# Patient Record
Sex: FEMALE | Race: WHITE | NOT HISPANIC OR LATINO
[De-identification: names, ages, dates, MRNs, and addresses within clinical notes are randomized per-mention and may not be internally consistent; named-entity substitution may affect disease eponyms.]

---

## 2017-02-02 ENCOUNTER — MESSAGE (OUTPATIENT)
Age: 72
End: 2017-02-02

## 2017-02-14 ENCOUNTER — APPOINTMENT (OUTPATIENT)
Dept: NEUROLOGY | Facility: CLINIC | Age: 72
End: 2017-02-14

## 2017-02-14 VITALS
HEART RATE: 93 BPM | SYSTOLIC BLOOD PRESSURE: 141 MMHG | BODY MASS INDEX: 36.8 KG/M2 | HEIGHT: 62 IN | WEIGHT: 200 LBS | DIASTOLIC BLOOD PRESSURE: 81 MMHG

## 2017-03-10 ENCOUNTER — APPOINTMENT (OUTPATIENT)
Dept: NEUROLOGY | Facility: CLINIC | Age: 72
End: 2017-03-10

## 2017-03-20 ENCOUNTER — OTHER (OUTPATIENT)
Age: 72
End: 2017-03-20

## 2017-03-21 ENCOUNTER — OTHER (OUTPATIENT)
Age: 72
End: 2017-03-21

## 2017-04-14 ENCOUNTER — APPOINTMENT (OUTPATIENT)
Dept: NEUROLOGY | Facility: CLINIC | Age: 72
End: 2017-04-14

## 2017-04-14 VITALS
WEIGHT: 175 LBS | HEIGHT: 62 IN | SYSTOLIC BLOOD PRESSURE: 154 MMHG | BODY MASS INDEX: 32.2 KG/M2 | DIASTOLIC BLOOD PRESSURE: 91 MMHG | HEART RATE: 77 BPM

## 2017-07-14 ENCOUNTER — APPOINTMENT (OUTPATIENT)
Dept: NEUROLOGY | Facility: CLINIC | Age: 72
End: 2017-07-14

## 2017-07-14 VITALS — HEART RATE: 72 BPM | DIASTOLIC BLOOD PRESSURE: 91 MMHG | SYSTOLIC BLOOD PRESSURE: 178 MMHG

## 2017-07-18 ENCOUNTER — MEDICATION RENEWAL (OUTPATIENT)
Age: 72
End: 2017-07-18

## 2017-09-05 ENCOUNTER — RX RENEWAL (OUTPATIENT)
Age: 72
End: 2017-09-05

## 2017-09-18 ENCOUNTER — EMERGENCY (EMERGENCY)
Facility: HOSPITAL | Age: 72
LOS: 1 days | Discharge: HOME | End: 2017-09-18
Attending: EMERGENCY MEDICINE | Admitting: EMERGENCY MEDICINE
Payer: MEDICARE

## 2017-09-18 VITALS
WEIGHT: 179.9 LBS | HEART RATE: 78 BPM | RESPIRATION RATE: 16 BRPM | TEMPERATURE: 98 F | OXYGEN SATURATION: 97 % | SYSTOLIC BLOOD PRESSURE: 155 MMHG | DIASTOLIC BLOOD PRESSURE: 96 MMHG

## 2017-09-18 VITALS — RESPIRATION RATE: 17 BRPM | OXYGEN SATURATION: 99 % | TEMPERATURE: 98 F

## 2017-09-18 DIAGNOSIS — G20 PARKINSON'S DISEASE: ICD-10-CM
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
## 2017-09-18 DIAGNOSIS — Z98.49 CATARACT EXTRACTION STATUS, UNSPECIFIED EYE: ICD-10-CM

## 2017-09-18 DIAGNOSIS — F41.9 ANXIETY DISORDER, UNSPECIFIED: ICD-10-CM

## 2017-09-18 LAB
ALBUMIN SERPL ELPH-MCNC: 3.6 G/DL — SIGNIFICANT CHANGE UP (ref 3.3–5)
ALP SERPL-CCNC: 77 U/L — SIGNIFICANT CHANGE UP (ref 40–120)
ALT FLD-CCNC: 16 U/L — SIGNIFICANT CHANGE UP (ref 12–78)
AMYLASE P1 CFR SERPL: 57 U/L — SIGNIFICANT CHANGE UP (ref 25–115)
ANION GAP SERPL CALC-SCNC: 5 MMOL/L — SIGNIFICANT CHANGE UP (ref 5–17)
APPEARANCE UR: CLEAR — SIGNIFICANT CHANGE UP
AST SERPL-CCNC: 11 U/L — LOW (ref 15–37)
BASOPHILS # BLD AUTO: 0.1 K/UL — SIGNIFICANT CHANGE UP (ref 0–0.2)
BASOPHILS NFR BLD AUTO: 0.8 % — SIGNIFICANT CHANGE UP (ref 0–2)
BILIRUB SERPL-MCNC: 0.4 MG/DL — SIGNIFICANT CHANGE UP (ref 0.2–1.2)
BILIRUB UR-MCNC: NEGATIVE — SIGNIFICANT CHANGE UP
BUN SERPL-MCNC: 19 MG/DL — SIGNIFICANT CHANGE UP (ref 7–23)
CALCIUM SERPL-MCNC: 8.5 MG/DL — SIGNIFICANT CHANGE UP (ref 8.5–10.1)
CHLORIDE SERPL-SCNC: 105 MMOL/L — SIGNIFICANT CHANGE UP (ref 96–108)
CK MB BLD-MCNC: 2.1 % — SIGNIFICANT CHANGE UP (ref 0–3.5)
CK MB CFR SERPL CALC: 0.9 NG/ML — SIGNIFICANT CHANGE UP (ref 0–3.6)
CK SERPL-CCNC: 42 U/L — SIGNIFICANT CHANGE UP (ref 26–192)
CO2 SERPL-SCNC: 30 MMOL/L — SIGNIFICANT CHANGE UP (ref 22–31)
COLOR SPEC: YELLOW — SIGNIFICANT CHANGE UP
CREAT SERPL-MCNC: 0.83 MG/DL — SIGNIFICANT CHANGE UP (ref 0.5–1.3)
DIFF PNL FLD: ABNORMAL
EOSINOPHIL # BLD AUTO: 0.1 K/UL — SIGNIFICANT CHANGE UP (ref 0–0.5)
EOSINOPHIL NFR BLD AUTO: 0.6 % — SIGNIFICANT CHANGE UP (ref 0–6)
GLUCOSE SERPL-MCNC: 93 MG/DL — SIGNIFICANT CHANGE UP (ref 70–99)
GLUCOSE UR QL: NEGATIVE — SIGNIFICANT CHANGE UP
HCT VFR BLD CALC: 44.5 % — SIGNIFICANT CHANGE UP (ref 34.5–45)
HGB BLD-MCNC: 14.4 G/DL — SIGNIFICANT CHANGE UP (ref 11.5–15.5)
KETONES UR-MCNC: NEGATIVE — SIGNIFICANT CHANGE UP
LEUKOCYTE ESTERASE UR-ACNC: NEGATIVE — SIGNIFICANT CHANGE UP
LIDOCAIN IGE QN: 215 U/L — SIGNIFICANT CHANGE UP (ref 73–393)
LYMPHOCYTES # BLD AUTO: 1.1 K/UL — SIGNIFICANT CHANGE UP (ref 1–3.3)
LYMPHOCYTES # BLD AUTO: 10.4 % — LOW (ref 13–44)
MCHC RBC-ENTMCNC: 29.4 PG — SIGNIFICANT CHANGE UP (ref 27–34)
MCHC RBC-ENTMCNC: 32.4 GM/DL — SIGNIFICANT CHANGE UP (ref 32–36)
MCV RBC AUTO: 90.9 FL — SIGNIFICANT CHANGE UP (ref 80–100)
MONOCYTES # BLD AUTO: 0.8 K/UL — SIGNIFICANT CHANGE UP (ref 0–0.9)
MONOCYTES NFR BLD AUTO: 7.5 % — SIGNIFICANT CHANGE UP (ref 1–9)
NEUTROPHILS # BLD AUTO: 8.3 K/UL — HIGH (ref 1.8–7.4)
NEUTROPHILS NFR BLD AUTO: 80.7 % — HIGH (ref 43–77)
NITRITE UR-MCNC: NEGATIVE — SIGNIFICANT CHANGE UP
NT-PROBNP SERPL-SCNC: 212 PG/ML — HIGH (ref 0–125)
PH UR: 5 — SIGNIFICANT CHANGE UP (ref 5–8)
PLATELET # BLD AUTO: 231 K/UL — SIGNIFICANT CHANGE UP (ref 150–400)
POTASSIUM SERPL-MCNC: 4.9 MMOL/L — SIGNIFICANT CHANGE UP (ref 3.5–5.3)
POTASSIUM SERPL-SCNC: 4.9 MMOL/L — SIGNIFICANT CHANGE UP (ref 3.5–5.3)
PROT SERPL-MCNC: 7.2 G/DL — SIGNIFICANT CHANGE UP (ref 6–8.3)
PROT UR-MCNC: NEGATIVE — SIGNIFICANT CHANGE UP
RBC # BLD: 4.9 M/UL — SIGNIFICANT CHANGE UP (ref 3.8–5.2)
RBC # FLD: 12.6 % — SIGNIFICANT CHANGE UP (ref 10.3–14.5)
SODIUM SERPL-SCNC: 140 MMOL/L — SIGNIFICANT CHANGE UP (ref 135–145)
SP GR SPEC: 1.01 — SIGNIFICANT CHANGE UP (ref 1.01–1.02)
TROPONIN I SERPL-MCNC: <.015 NG/ML — SIGNIFICANT CHANGE UP (ref 0.01–0.04)
UROBILINOGEN FLD QL: NEGATIVE — SIGNIFICANT CHANGE UP
WBC # BLD: 10.3 K/UL — SIGNIFICANT CHANGE UP (ref 3.8–10.5)
WBC # FLD AUTO: 10.3 K/UL — SIGNIFICANT CHANGE UP (ref 3.8–10.5)

## 2017-09-18 PROCEDURE — 81001 URINALYSIS AUTO W/SCOPE: CPT

## 2017-09-18 PROCEDURE — 70450 CT HEAD/BRAIN W/O DYE: CPT

## 2017-09-18 PROCEDURE — 71045 X-RAY EXAM CHEST 1 VIEW: CPT

## 2017-09-18 PROCEDURE — 85027 COMPLETE CBC AUTOMATED: CPT

## 2017-09-18 PROCEDURE — 72125 CT NECK SPINE W/O DYE: CPT | Mod: 26

## 2017-09-18 PROCEDURE — 82550 ASSAY OF CK (CPK): CPT

## 2017-09-18 PROCEDURE — 96360 HYDRATION IV INFUSION INIT: CPT

## 2017-09-18 PROCEDURE — 72125 CT NECK SPINE W/O DYE: CPT

## 2017-09-18 PROCEDURE — 82553 CREATINE MB FRACTION: CPT

## 2017-09-18 PROCEDURE — 99284 EMERGENCY DEPT VISIT MOD MDM: CPT | Mod: 25

## 2017-09-18 PROCEDURE — 36415 COLL VENOUS BLD VENIPUNCTURE: CPT

## 2017-09-18 PROCEDURE — 93005 ELECTROCARDIOGRAM TRACING: CPT

## 2017-09-18 PROCEDURE — 87086 URINE CULTURE/COLONY COUNT: CPT

## 2017-09-18 PROCEDURE — 96361 HYDRATE IV INFUSION ADD-ON: CPT

## 2017-09-18 PROCEDURE — 82150 ASSAY OF AMYLASE: CPT

## 2017-09-18 PROCEDURE — 71010: CPT | Mod: 26

## 2017-09-18 PROCEDURE — 99285 EMERGENCY DEPT VISIT HI MDM: CPT

## 2017-09-18 PROCEDURE — 83690 ASSAY OF LIPASE: CPT

## 2017-09-18 PROCEDURE — 84484 ASSAY OF TROPONIN QUANT: CPT

## 2017-09-18 PROCEDURE — 70450 CT HEAD/BRAIN W/O DYE: CPT | Mod: 26

## 2017-09-18 PROCEDURE — 83880 ASSAY OF NATRIURETIC PEPTIDE: CPT

## 2017-09-18 PROCEDURE — 80053 COMPREHEN METABOLIC PANEL: CPT

## 2017-09-18 RX ORDER — SODIUM CHLORIDE 9 MG/ML
1000 INJECTION INTRAMUSCULAR; INTRAVENOUS; SUBCUTANEOUS ONCE
Qty: 0 | Refills: 0 | Status: COMPLETED | OUTPATIENT
Start: 2017-09-18 | End: 2017-09-18

## 2017-09-18 RX ORDER — SODIUM CHLORIDE 9 MG/ML
3 INJECTION INTRAMUSCULAR; INTRAVENOUS; SUBCUTANEOUS ONCE
Qty: 0 | Refills: 0 | Status: COMPLETED | OUTPATIENT
Start: 2017-09-18 | End: 2017-09-18

## 2017-09-18 RX ADMIN — SODIUM CHLORIDE 3 MILLILITER(S): 9 INJECTION INTRAMUSCULAR; INTRAVENOUS; SUBCUTANEOUS at 14:11

## 2017-09-18 RX ADMIN — SODIUM CHLORIDE 1000 MILLILITER(S): 9 INJECTION INTRAMUSCULAR; INTRAVENOUS; SUBCUTANEOUS at 15:02

## 2017-09-18 RX ADMIN — SODIUM CHLORIDE 1000 MILLILITER(S): 9 INJECTION INTRAMUSCULAR; INTRAVENOUS; SUBCUTANEOUS at 16:30

## 2017-09-18 NOTE — ED PROVIDER NOTE - PLAN OF CARE
Return to the ED for any new or worsening symptoms  Take your medication as prescribed  Follow up with your PMD in 2-3 days for a recheck  Increase your water intake   Advance activity as tolerated

## 2017-09-18 NOTE — ED PROVIDER NOTE - OBJECTIVE STATEMENT
Pt is a 73 yo female who presents to the ED with a cc of near syncope.  PMHx of anxiety and Parkinson's disease.  Pt reports that she was coming out of the grocery store and walking to her car pushing the shopping cart.  She has felt lightheaded in the store but had not thought much of it.  While she was walking to her car symptoms worsened.  She reports that she was almost to her vehicle when her symptoms worsened, she lost her balance, and fell to the ground striking the back of her head.  Denies LOC.  Pt is not on blood thinners.   Reports associated nausea and pain at site of impact.  Denies fever, chills, V/D/C, CP, SOB, abd pain, ext numbness or weakness.  Pt reports that she had little to eat or drink and that she frequently becomes dehydrated.   Pt has no known cardiac disease.

## 2017-09-18 NOTE — ED PROVIDER NOTE - NEUROLOGICAL, MLM
Alert and oriented, no focal deficits, no motor or sensory deficits. Resting tremor noted related to pt Parkinson's disease

## 2017-09-18 NOTE — ED PROVIDER NOTE - CHPI ED SYMPTOMS NEG
no cough/no shortness of breath/no fever/no vomiting/no diaphoresis/no chills/no chest pain/no back pain

## 2017-09-18 NOTE — ED PROVIDER NOTE - PSH
Detached Retina, Left  and macular hole 5/2011  Macular Hole  2010  Retinal Tear  2003  S/P Cataract Surgery  2011

## 2017-09-18 NOTE — ED PROVIDER NOTE - CARE PLAN
Principal Discharge DX:	Lightheadedness  Instructions for follow-up, activity and diet:	Return to the ED for any new or worsening symptoms  Take your medication as prescribed  Follow up with your PMD in 2-3 days for a recheck  Increase your water intake   Advance activity as tolerated  Secondary Diagnosis:	Anxiety  Secondary Diagnosis:	Parkinson disease

## 2017-09-18 NOTE — CONSULT NOTE ADULT - SUBJECTIVE AND OBJECTIVE BOX
CHIEF COMPLAINT: Patient is a 72y old  Female who presents with a chief complaint of syncope    HPI:  72 F w hx of Parkinson, anxiety who presents with near syncope. She was walking outside in a parking lot and felt very lightheaded and nauseous Then she fell and hit the back of her head. Denies any LOC. Denies any chest pain, dyspnea, PND, orthopnea,  syncope, lower extremity edema, stroke like symptoms. SHe does not stay well hydrated and has overall poor PO intake.     EKG: SR, ?LVH PVCs    REVIEW OF SYSTEMS:   All other review of systems are negative unless indicated above    PAST MEDICAL & SURGICAL HISTORY:  Parkinson disease  Anxiety  Detached Retina, Left: and macular hole 5/2011  S/P Cataract Surgery: 2011  Macular Hole: 2010  Retinal Tear: 2003      SOCIAL HISTORY:  No tobacco, ethanol, or drug abuse.    FAMILY HISTORY:    No family history of acute MI or sudden cardiac death.    Meds: Sinemet, xanax, wellbutrin, zoloft.       Allergies    No Known Allergies    Intolerances            VITAL SIGNS:   Vital Signs Last 24 Hrs  T(C): 36.6 (18 Sep 2017 12:27), Max: 36.6 (18 Sep 2017 12:27)  T(F): 97.9 (18 Sep 2017 12:27), Max: 97.9 (18 Sep 2017 12:27)  HR: 78 (18 Sep 2017 12:27) (78 - 78)  BP: 155/96 (18 Sep 2017 12:27) (155/96 - 155/96)  BP(mean): --  RR: 16 (18 Sep 2017 12:27) (16 - 16)  SpO2: 97% (18 Sep 2017 12:27) (97% - 97%)    I&O's Summary      On Exam:     Constitutional: NAD, awake and alert, well-developed  HEENT: Dry Mucous Membranes, Anicteric  Pulmonary: Non-labored, breath sounds are clear bilaterally, No wheezing, rales or rhonchi  Cardiovascular: Regular, S1 and S2, No murmurs, rubs, gallops or clicks  Gastrointestinal: Bowel Sounds present, soft, nontender.   Lymph: No peripheral edema. No lymphadenopathy.  Skin: No visible rashes or ulcers.  Psych:  Mood & affect appropriate    LABS: All Labs Reviewed:                        14.4   10.3  )-----------( 231      ( 18 Sep 2017 13:53 )             44.5     18 Sep 2017 15:13    140    |  105    |  19     ----------------------------<  93     4.9     |  30     |  0.83     Ca    8.5        18 Sep 2017 15:13    TPro  7.2    /  Alb  3.6    /  TBili  0.4    /  DBili  x      /  AST  11     /  ALT  16     /  AlkPhos  77     18 Sep 2017 15:13      CARDIAC MARKERS ( 18 Sep 2017 15:13 )  <.015 ng/mL / x     / 42 U/L / x     / 0.9 ng/mL      Blood Culture:   09-18 @ 15:13  Pro Bnp 212        RADIOLOGY: CHIEF COMPLAINT: Patient is a 72y old  Female who presents with a chief complaint of syncope    HPI:  72 F w hx of Parkinson, anxiety who presents with near syncope. She was walking outside in a parking lot and felt very lightheaded and nauseous Then she fell and hit the back of her head. Denies any LOC. Denies any chest pain, dyspnea, PND, orthopnea,  syncope, lower extremity edema, stroke like symptoms. SHe does not stay well hydrated and has overall poor PO intake.     EKG: SR, ?LVH PVCs    REVIEW OF SYSTEMS:   All other review of systems are negative unless indicated above    PAST MEDICAL & SURGICAL HISTORY:  Parkinson disease  Anxiety  Detached Retina, Left: and macular hole 5/2011  S/P Cataract Surgery: 2011  Macular Hole: 2010  Retinal Tear: 2003      SOCIAL HISTORY:  No tobacco, ethanol, or drug abuse.    FAMILY HISTORY:    No family history of acute MI or sudden cardiac death.    Meds: Sinemet, xanax, wellbutrin, zoloft.       Allergies    No Known Allergies    Intolerances            VITAL SIGNS:   Vital Signs Last 24 Hrs  T(C): 36.6 (18 Sep 2017 12:27), Max: 36.6 (18 Sep 2017 12:27)  T(F): 97.9 (18 Sep 2017 12:27), Max: 97.9 (18 Sep 2017 12:27)  HR: 78 (18 Sep 2017 12:27) (78 - 78)  BP: 155/96 (18 Sep 2017 12:27) (155/96 - 155/96)  BP(mean): --  RR: 16 (18 Sep 2017 12:27) (16 - 16)  SpO2: 97% (18 Sep 2017 12:27) (97% - 97%)    I&O's Summary      On Exam:     Constitutional: NAD, awake and alert, well-developed  HEENT: Dry Mucous Membranes, Anicteric  Pulmonary: Non-labored, breath sounds are clear bilaterally, No wheezing, rales or rhonchi  Cardiovascular: Regular, S1 and S2, No murmurs, rubs, gallops or clicks  Gastrointestinal: Bowel Sounds present, soft, nontender.   Lymph: No peripheral edema. No lymphadenopathy.  Skin: No visible rashes or ulcers.  Psych:  Mood & affect appropriate    LABS: All Labs Reviewed:                        14.4   10.3  )-----------( 231      ( 18 Sep 2017 13:53 )             44.5     18 Sep 2017 15:13    140    |  105    |  19     ----------------------------<  93     4.9     |  30     |  0.83     Ca    8.5        18 Sep 2017 15:13    TPro  7.2    /  Alb  3.6    /  TBili  0.4    /  DBili  x      /  AST  11     /  ALT  16     /  AlkPhos  77     18 Sep 2017 15:13      CARDIAC MARKERS ( 18 Sep 2017 15:13 )  <.015 ng/mL / x     / 42 U/L / x     / 0.9 ng/mL      Blood Culture:   09-18 @ 15:13  Pro Bnp 212        RADIOLOGY:       < from: Xray Chest 1 View AP- PORTABLE-Urgent (09.18.17 @ 14:00) >    EXAM:  PORTABLE CHEST URGENT                            PROCEDURE DATE:  09/18/2017          INTERPRETATION:  History: Cough and shortness of breath    Portable AP radiograph of the chest is performed. Comparison is made to   2016.    The cardiomediastinal silhouette is normal. the trachea is midline. There   is no focal infiltrate or pleural effusion. The osseous structures are   intact.    Impression: No active disease. No change.                CATHERINE MICHAEL   This document has been electronically signed. Sep 18 2017  2:43PM                < end of copied text >    < from: CT Head No Cont (09.18.17 @ 14:39) >    EXAM:  CT BRAIN                            PROCEDURE DATE:  09/18/2017          INTERPRETATION:  History: Fall, hit head    Noncontrast head CT.   Moderate cerebral parenchymal volume loss. Normal brain density for age.   No bleed extra-axial collection infarct or mass effect. Clear sinuses. No   displaced fracture. Left posterior parietal scalp hematoma.    Impression: No intracranial bleed or displaced fracture.                  KAROLINA CASAREZ M.D., ATTENDING RADIOLOGIST  This document has been electronically signed. Sep 18 2017  2:43PM                < end of copied text >

## 2017-09-18 NOTE — CONSULT NOTE ADULT - ASSESSMENT
72 F w near syncope  - likely from volume depletion. + orthostatics.   - IVF.   - No signs of significant ischemia or volume overload.   - EKG without ischemic changes. Isolated PVCs. Monitor and replete electrolytes. Keep K>4.0 and Mg>2.0.   - Outpt echo  Patient agrees with the plan and will contact our office to arrange followup.

## 2017-09-18 NOTE — ED PROVIDER NOTE - MEDICAL DECISION MAKING DETAILS
cbc, cmp, amylase, lipase, cardiac enzymes, BNP, EKG, chest x-ray, CT head, cervical spine, chest x-ray, UA, urine culture, IVF, orthostatic vitals, cardiology consult

## 2017-09-18 NOTE — ED PROVIDER NOTE - PROGRESS NOTE DETAILS
Pt initially orthostatic on presentation resolved with IVF hydration. Seen by cardiology symptoms likely related to dehydration cleared for discharge home with outpatient follow up.  Results of labs and images reviewed, all questions answered, copy provided.

## 2017-09-18 NOTE — ED ADULT NURSE NOTE - OBJECTIVE STATEMENT
Pt. received alert and oriented x4 with chief complaint of "legs giving out and falling in the supermarket." Pt. states she hit the back of her head but denies LOC.

## 2017-09-19 LAB
CULTURE RESULTS: SIGNIFICANT CHANGE UP
SPECIMEN SOURCE: SIGNIFICANT CHANGE UP

## 2017-09-23 ENCOUNTER — TRANSCRIPTION ENCOUNTER (OUTPATIENT)
Age: 72
End: 2017-09-23

## 2017-09-27 ENCOUNTER — TRANSCRIPTION ENCOUNTER (OUTPATIENT)
Age: 72
End: 2017-09-27

## 2017-10-02 ENCOUNTER — RX RENEWAL (OUTPATIENT)
Age: 72
End: 2017-10-02

## 2017-11-06 ENCOUNTER — RX RENEWAL (OUTPATIENT)
Age: 72
End: 2017-11-06

## 2017-11-21 ENCOUNTER — APPOINTMENT (OUTPATIENT)
Dept: NEUROLOGY | Facility: CLINIC | Age: 72
End: 2017-11-21
Payer: MEDICARE

## 2017-11-21 VITALS
WEIGHT: 190 LBS | BODY MASS INDEX: 34.96 KG/M2 | SYSTOLIC BLOOD PRESSURE: 150 MMHG | HEART RATE: 82 BPM | DIASTOLIC BLOOD PRESSURE: 83 MMHG | HEIGHT: 62 IN

## 2017-11-21 PROCEDURE — 99214 OFFICE O/P EST MOD 30 MIN: CPT

## 2017-12-28 ENCOUNTER — RX RENEWAL (OUTPATIENT)
Age: 72
End: 2017-12-28

## 2018-01-26 ENCOUNTER — MEDICATION RENEWAL (OUTPATIENT)
Age: 73
End: 2018-01-26

## 2018-03-02 ENCOUNTER — APPOINTMENT (OUTPATIENT)
Dept: NEUROLOGY | Facility: CLINIC | Age: 73
End: 2018-03-02
Payer: MEDICARE

## 2018-03-02 PROCEDURE — 99214 OFFICE O/P EST MOD 30 MIN: CPT

## 2018-03-19 ENCOUNTER — MEDICATION RENEWAL (OUTPATIENT)
Age: 73
End: 2018-03-19

## 2018-03-20 ENCOUNTER — MEDICATION RENEWAL (OUTPATIENT)
Age: 73
End: 2018-03-20

## 2018-03-28 ENCOUNTER — APPOINTMENT (OUTPATIENT)
Dept: NEUROLOGY | Facility: CLINIC | Age: 73
End: 2018-03-28
Payer: MEDICARE

## 2018-03-28 VITALS
HEART RATE: 84 BPM | WEIGHT: 190 LBS | DIASTOLIC BLOOD PRESSURE: 77 MMHG | BODY MASS INDEX: 34.75 KG/M2 | SYSTOLIC BLOOD PRESSURE: 135 MMHG

## 2018-03-28 PROCEDURE — 99214 OFFICE O/P EST MOD 30 MIN: CPT

## 2018-04-11 ENCOUNTER — RX RENEWAL (OUTPATIENT)
Age: 73
End: 2018-04-11

## 2018-04-11 ENCOUNTER — CLINICAL ADVICE (OUTPATIENT)
Age: 73
End: 2018-04-11

## 2018-05-24 ENCOUNTER — MEDICATION RENEWAL (OUTPATIENT)
Age: 73
End: 2018-05-24

## 2018-06-28 ENCOUNTER — OTHER (OUTPATIENT)
Age: 73
End: 2018-06-28

## 2018-07-30 ENCOUNTER — EMERGENCY (EMERGENCY)
Facility: HOSPITAL | Age: 73
LOS: 1 days | Discharge: HOME | End: 2018-07-30
Attending: EMERGENCY MEDICINE
Payer: MEDICARE

## 2018-07-30 VITALS
OXYGEN SATURATION: 96 % | TEMPERATURE: 98 F | WEIGHT: 179.9 LBS | HEIGHT: 62 IN | RESPIRATION RATE: 16 BRPM | SYSTOLIC BLOOD PRESSURE: 150 MMHG | HEART RATE: 81 BPM | DIASTOLIC BLOOD PRESSURE: 89 MMHG

## 2018-07-30 VITALS
RESPIRATION RATE: 17 BRPM | TEMPERATURE: 98 F | OXYGEN SATURATION: 100 % | DIASTOLIC BLOOD PRESSURE: 74 MMHG | HEART RATE: 80 BPM | SYSTOLIC BLOOD PRESSURE: 147 MMHG

## 2018-07-30 PROBLEM — G20 PARKINSON'S DISEASE: Chronic | Status: ACTIVE | Noted: 2017-09-18

## 2018-07-30 PROCEDURE — 90471 IMMUNIZATION ADMIN: CPT

## 2018-07-30 PROCEDURE — 99284 EMERGENCY DEPT VISIT MOD MDM: CPT

## 2018-07-30 PROCEDURE — 70450 CT HEAD/BRAIN W/O DYE: CPT

## 2018-07-30 PROCEDURE — 70450 CT HEAD/BRAIN W/O DYE: CPT | Mod: 26

## 2018-07-30 PROCEDURE — 90715 TDAP VACCINE 7 YRS/> IM: CPT

## 2018-07-30 PROCEDURE — 99284 EMERGENCY DEPT VISIT MOD MDM: CPT | Mod: 25

## 2018-07-30 RX ORDER — TETANUS TOXOID, REDUCED DIPHTHERIA TOXOID AND ACELLULAR PERTUSSIS VACCINE, ADSORBED 5; 2.5; 8; 8; 2.5 [IU]/.5ML; [IU]/.5ML; UG/.5ML; UG/.5ML; UG/.5ML
0.5 SUSPENSION INTRAMUSCULAR ONCE
Qty: 0 | Refills: 0 | Status: COMPLETED | OUTPATIENT
Start: 2018-07-30 | End: 2018-07-30

## 2018-07-30 RX ADMIN — TETANUS TOXOID, REDUCED DIPHTHERIA TOXOID AND ACELLULAR PERTUSSIS VACCINE, ADSORBED 0.5 MILLILITER(S): 5; 2.5; 8; 8; 2.5 SUSPENSION INTRAMUSCULAR at 14:29

## 2018-07-30 NOTE — ED ADULT NURSE NOTE - OBJECTIVE STATEMENT
Present to ER with c/o of hematoma on right forehead s/p fall. Pt staets she fell on a carpeted floor. Pt has history of parkinsons. Denies any LOC or pain. Pt was instructed by PMD to come and get checked.

## 2018-07-30 NOTE — ED PROVIDER NOTE - PROGRESS NOTE DETAILS
Ct results discussed with patient. no acute fx or ICH. no sinus tenderness. do not suspect acute sinusitis. head injury instructions discussed and return precautions explained

## 2018-07-30 NOTE — ED PROVIDER NOTE - NEUROLOGICAL, MLM
Alert and oriented. symmetric eyebrow raise and smile. elevates tongue and shoulders without difficulty. sensation to face equal bilaterally. normal finger to nose. good  strength bilaterally

## 2018-07-30 NOTE — ED ADULT TRIAGE NOTE - CHIEF COMPLAINT QUOTE
"I have parkinsons & I fell" hematoma right davidson   denies LOC  denies any pain was instructed to come & "get checked out by PMD"

## 2018-07-30 NOTE — ED PROVIDER NOTE - OBJECTIVE STATEMENT
presents for a fall and head injury. has parkinson's and unsteady on feet. was walking without her walker, tripped, and hit her head on the hardwood floor. no LOC. does not take any blood thinners. denies any confusion or memory loss. acting appropriate per . no neck or back pain. small abrasion to right elbow but no elbow pain. unsure of tetanus status. developed a hematoma to right forehead so was seen by PCP who sent her to ED for further evaluation due to hematoma. reports mild dizziness and nausea, but states this is chronic and no change after fall. no other complaints  PCP Camille

## 2018-07-30 NOTE — ED PROVIDER NOTE - EYES, MLM
Clear bilaterally, pupils equal, round and reactive to light. EOMI. no orbital wall tenderness or crepitus

## 2018-07-30 NOTE — ED PROVIDER NOTE - MEDICAL DECISION MAKING DETAILS
mechanical fall today at 1030. hematoma to right side of forehead. will CT. does not take any blood thinners. no neck or back pain. no ext tenderness. do not suspect ext fx. will update tetanus

## 2018-07-30 NOTE — ED PROVIDER NOTE - CHPI ED SYMPTOMS NEG
no confusion/no loss of consciousness/no blurred vision/no vomiting/no weakness/no change in level of consciousness

## 2018-07-30 NOTE — ED PROVIDER NOTE - ENMT, MLM
Airway patent, Mouth with normal mucosa. Throat has no vesicles, no oropharyngeal exudates and uvula is midline.  no castillo sign or raccoon eyes. no hemotympanum. small hematoma to right side of forehead

## 2018-07-30 NOTE — ED PROVIDER NOTE - NEURO NEGATIVE STATEMENT, MLM
no loss of consciousness, no gait abnormality, no headache, no sensory deficits, and no weakness. mild chronic dizziness, no change in dizziness after fall

## 2018-07-30 NOTE — ED PROVIDER NOTE - MUSCULOSKELETAL, MLM
no cervical/thoracic/lumbar vertebral tenderness or step off deformities. no ext tenderness. full ROM including pronation and supination of elbow

## 2018-07-30 NOTE — ED PROVIDER NOTE - ATTENDING CONTRIBUTION TO CARE
pt referred by pmd for head injury and abrasions s/p fall this am. pt reports has parkinsons, falls frequently. pt declining pain meds. no loc, n/v, weakness, numbness, cp, sob, abd pain, arm or leg pain, neck or back pain.  wd, wn female, nad, right forehead hematoma, airway intact, mmm, perrl, s1s2 rrr, lung cta, abd soft, nt, ext nt, full rom, spine nt, cn2-12 intact, no motor or sensory deficit. abrasion right elbow

## 2018-08-08 ENCOUNTER — APPOINTMENT (OUTPATIENT)
Dept: NEUROLOGY | Facility: CLINIC | Age: 73
End: 2018-08-08
Payer: MEDICARE

## 2018-08-08 VITALS
BODY MASS INDEX: 33.13 KG/M2 | SYSTOLIC BLOOD PRESSURE: 140 MMHG | WEIGHT: 180 LBS | HEIGHT: 62 IN | DIASTOLIC BLOOD PRESSURE: 77 MMHG | HEART RATE: 82 BPM

## 2018-08-08 PROCEDURE — 99214 OFFICE O/P EST MOD 30 MIN: CPT

## 2018-09-17 ENCOUNTER — MEDICATION RENEWAL (OUTPATIENT)
Age: 73
End: 2018-09-17

## 2018-12-14 ENCOUNTER — APPOINTMENT (OUTPATIENT)
Dept: NEUROLOGY | Facility: CLINIC | Age: 73
End: 2018-12-14
Payer: MEDICARE

## 2018-12-14 PROCEDURE — 99214 OFFICE O/P EST MOD 30 MIN: CPT

## 2019-01-02 ENCOUNTER — MEDICATION RENEWAL (OUTPATIENT)
Age: 74
End: 2019-01-02

## 2019-02-19 ENCOUNTER — MEDICATION RENEWAL (OUTPATIENT)
Age: 74
End: 2019-02-19

## 2019-03-14 ENCOUNTER — APPOINTMENT (OUTPATIENT)
Dept: NEUROLOGY | Facility: CLINIC | Age: 74
End: 2019-03-14
Payer: MEDICARE

## 2019-03-14 VITALS — DIASTOLIC BLOOD PRESSURE: 80 MMHG | HEART RATE: 87 BPM | SYSTOLIC BLOOD PRESSURE: 143 MMHG

## 2019-03-14 VITALS — DIASTOLIC BLOOD PRESSURE: 75 MMHG | HEART RATE: 97 BPM | SYSTOLIC BLOOD PRESSURE: 106 MMHG

## 2019-03-14 PROCEDURE — 99214 OFFICE O/P EST MOD 30 MIN: CPT

## 2019-03-14 NOTE — REASON FOR VISIT
[Acute] : an acute visit [Spouse] : spouse [Family Member] : family member [FreeTextEntry1] : Parkinsonism

## 2019-03-14 NOTE — HISTORY OF PRESENT ILLNESS
[FreeTextEntry1] : Patient is a 74-year-old right handed woman who developed forgetfulness as well as obsessiveness (repetitive writing of numbers) about 2015, as well as shuffling in her gait. \par \par  Her anxiety makes her worse and feels her anxiety has worsen. Currently being followed by a psychiatrist and a therapist. Taking Wellbutrin 150mg daily and Zoloft 200mg daily, xanax 0.25mg twice a day. Low appetite and does not eat all her meals. No swallowing difficulties. No drooling. Sleeping is not good, goes to the bathroom and sometimes need assistance because she has shuffling in her gait. No acting out of her dreams. No changes in memory, however has trouble with dates. Dizziness when she goes from a sitting to a standing position or when she tries moving her head quickly. \par \par 1. Returned from [place], got suddenly worse, was checked for UTI. Back to baseline now\par 2. Had falls (did not use walker), hit head, no LOC. Head CT showed no bleed. No w using walker, sometimes wheelchair. Still has falls. Feels faint during shower at times. \par 3. No hallucinations. Currently taking Sinemet 25/100 2 tabs five times a day and Carbidopa 25mg 1 tab five times a day, and sinemet CR 50/200 at bedtime. No dyskinesias. Some tremors. In November, added midodrine (5mg bid) for near fainting episodes, which have resolved with that (takes it morning and mid afternoon)\par 4. Doing PT 2-3/week,  at the gym 1x/week. \par 5. Her anxiety makes her worse and feels her anxiety has worsen. Currently being followed by a psychiatrist and a therapist. Taking Wellbutrin 300mg daily and Zoloft 200mg daily, xanax 0.25mg twice a day. Also taking Donepezil 10mg daily and memantine was added, no difference\par 5. Also talking B6 and B12\par 6. Sleep is ok, except nightmares, improved with xanax. These may wake her up in the middle of the night.

## 2019-03-14 NOTE — PHYSICAL EXAM
[General Appearance - Alert] : alert [General Appearance - In No Acute Distress] : in no acute distress [General Appearance - Well Developed] : well developed [Oriented To Time, Place, And Person] : oriented to person, place, and time [Impaired Insight] : insight and judgment were intact [Mood] : the mood was normal [Person] : oriented to person [Place] : oriented to place [Registration Intact] : recent registration memory intact [Cranial Nerves Optic (II)] : visual acuity intact bilaterally,  visual fields full to confrontation, pupils equal round and reactive to light [Cranial Nerves Oculomotor (III)] : extraocular motion intact [Cranial Nerves Trigeminal (V)] : facial sensation intact symmetrically [Cranial Nerves Facial (VII)] : face symmetrical [Cranial Nerves Vestibulocochlear (VIII)] : hearing was intact bilaterally [Cranial Nerves Glossopharyngeal (IX)] : tongue and palate midline [Cranial Nerves Accessory (XI - Cranial And Spinal)] : head turning and shoulder shrug symmetric [Cranial Nerves Hypoglossal (XII)] : there was no tongue deviation with protrusion [Motor Handedness Right-Handed] : the patient is right hand dominant [Naming Objects] : no difficulty naming common objects [Fluency] : fluency intact [Time] : disoriented to time [Short Term Intact] : short term memory impaired [Paresis Pronator Drift Right-Sided] : no pronator drift on the right [Paresis Pronator Drift Left-Sided] : no pronator drift on the left [FreeTextEntry4] : Year = 3/7/17. 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 [FreeTextEntry1] : Facial expression and volume speech were reduced. Extraocular movements were intact except possible supranuclear palsy (upgaze), no square waves jerk noted.  Bilateral resting and postural tremor, R>L. Tone was markedly increased at the neck, and bilateral upper extremities, L>R. Bradykinesia in finger taps, L>R, worse on heel stomps and toe taps, L>R.  Able to get up with help only, severe retropulsion. Can walk with walker only

## 2019-03-14 NOTE — DISCUSSION/SUMMARY
[FreeTextEntry1] : In summary, Mrs. Dodge is a 73-year-old right-handed woman with PDism since about 2015/16 who comes for follow up. She states she is well. Continues with shuffling in her gait and some falls. However improvement since starting Sinemet and PT. Worsening in her anxiety as well. No hallucinations or motor fluctuations. At night has some trouble going to the bathroom. The examination is remarkable for hypomimia, resting and postural tremor, L>R, increase tone in neck, generalized rigidity and bradykinesia L>R, worse on heel stomps and toe taps. Gait with slow stride and short steps, retropulsion. \par I cannot rule out and atypical or mixed disorder at this time.\par \par Recommendations:\par 1. Continue sinemet 50/200 CR 1 tab at bedtime. Continue sinemet at 2 pills 5x/day. Continue Carbidopa 25mg 1 tab four times a day. Continue midodrine. \par 2. Continue Donepezil, can stop Namenda again. Getting folate. \par 3. Follow up with psychiatrist\par 4. Continue PT and exercise\par 5. Trial of medical marijuana for anxiety, PD\par

## 2019-03-18 ENCOUNTER — OTHER (OUTPATIENT)
Age: 74
End: 2019-03-18

## 2019-04-18 ENCOUNTER — TRANSCRIPTION ENCOUNTER (OUTPATIENT)
Age: 74
End: 2019-04-18

## 2019-04-19 ENCOUNTER — INPATIENT (INPATIENT)
Facility: HOSPITAL | Age: 74
LOS: 2 days | Discharge: NURSING, REHAB OR LONG-TERM CARE | End: 2019-04-22
Attending: FAMILY MEDICINE | Admitting: FAMILY MEDICINE
Payer: MEDICARE

## 2019-04-19 VITALS — WEIGHT: 160.06 LBS | HEIGHT: 66 IN

## 2019-04-19 LAB
ALBUMIN SERPL ELPH-MCNC: 4.2 G/DL — SIGNIFICANT CHANGE UP (ref 3.3–5)
ALP SERPL-CCNC: 78 U/L — SIGNIFICANT CHANGE UP (ref 40–120)
ALT FLD-CCNC: 8 U/L — LOW (ref 12–78)
ANION GAP SERPL CALC-SCNC: 9 MMOL/L — SIGNIFICANT CHANGE UP (ref 5–17)
APPEARANCE UR: CLEAR — SIGNIFICANT CHANGE UP
AST SERPL-CCNC: 39 U/L — HIGH (ref 15–37)
BACTERIA # UR AUTO: ABNORMAL
BASOPHILS # BLD AUTO: 0.06 K/UL — SIGNIFICANT CHANGE UP (ref 0–0.2)
BASOPHILS NFR BLD AUTO: 0.6 % — SIGNIFICANT CHANGE UP (ref 0–2)
BILIRUB SERPL-MCNC: 0.5 MG/DL — SIGNIFICANT CHANGE UP (ref 0.2–1.2)
BILIRUB UR-MCNC: NEGATIVE — SIGNIFICANT CHANGE UP
BUN SERPL-MCNC: 20 MG/DL — SIGNIFICANT CHANGE UP (ref 7–23)
CALCIUM SERPL-MCNC: 9.1 MG/DL — SIGNIFICANT CHANGE UP (ref 8.5–10.1)
CHLORIDE SERPL-SCNC: 103 MMOL/L — SIGNIFICANT CHANGE UP (ref 96–108)
CO2 SERPL-SCNC: 24 MMOL/L — SIGNIFICANT CHANGE UP (ref 22–31)
COLOR SPEC: YELLOW — SIGNIFICANT CHANGE UP
CREAT SERPL-MCNC: 1.32 MG/DL — HIGH (ref 0.5–1.3)
DIFF PNL FLD: NEGATIVE — SIGNIFICANT CHANGE UP
EOSINOPHIL # BLD AUTO: 0.02 K/UL — SIGNIFICANT CHANGE UP (ref 0–0.5)
EOSINOPHIL NFR BLD AUTO: 0.2 % — SIGNIFICANT CHANGE UP (ref 0–6)
GLUCOSE SERPL-MCNC: 92 MG/DL — SIGNIFICANT CHANGE UP (ref 70–99)
GLUCOSE UR QL: NEGATIVE MG/DL — SIGNIFICANT CHANGE UP
HCT VFR BLD CALC: 40.1 % — SIGNIFICANT CHANGE UP (ref 34.5–45)
HGB BLD-MCNC: 13.1 G/DL — SIGNIFICANT CHANGE UP (ref 11.5–15.5)
IMM GRANULOCYTES NFR BLD AUTO: 0.3 % — SIGNIFICANT CHANGE UP (ref 0–1.5)
KETONES UR-MCNC: ABNORMAL
LEUKOCYTE ESTERASE UR-ACNC: ABNORMAL
LYMPHOCYTES # BLD AUTO: 0.88 K/UL — LOW (ref 1–3.3)
LYMPHOCYTES # BLD AUTO: 9.5 % — LOW (ref 13–44)
MCHC RBC-ENTMCNC: 30.6 PG — SIGNIFICANT CHANGE UP (ref 27–34)
MCHC RBC-ENTMCNC: 32.7 GM/DL — SIGNIFICANT CHANGE UP (ref 32–36)
MCV RBC AUTO: 93.7 FL — SIGNIFICANT CHANGE UP (ref 80–100)
MONOCYTES # BLD AUTO: 0.77 K/UL — SIGNIFICANT CHANGE UP (ref 0–0.9)
MONOCYTES NFR BLD AUTO: 8.3 % — SIGNIFICANT CHANGE UP (ref 2–14)
NEUTROPHILS # BLD AUTO: 7.52 K/UL — HIGH (ref 1.8–7.4)
NEUTROPHILS NFR BLD AUTO: 81.1 % — HIGH (ref 43–77)
NITRITE UR-MCNC: POSITIVE
NRBC # BLD: 0 /100 WBCS — SIGNIFICANT CHANGE UP (ref 0–0)
PH UR: 5 — SIGNIFICANT CHANGE UP (ref 5–8)
PLATELET # BLD AUTO: 253 K/UL — SIGNIFICANT CHANGE UP (ref 150–400)
POTASSIUM SERPL-MCNC: 5.1 MMOL/L — SIGNIFICANT CHANGE UP (ref 3.5–5.3)
POTASSIUM SERPL-SCNC: 5.1 MMOL/L — SIGNIFICANT CHANGE UP (ref 3.5–5.3)
PROT SERPL-MCNC: 7.5 GM/DL — SIGNIFICANT CHANGE UP (ref 6–8.3)
PROT UR-MCNC: 15 MG/DL
RBC # BLD: 4.28 M/UL — SIGNIFICANT CHANGE UP (ref 3.8–5.2)
RBC # FLD: 13.2 % — SIGNIFICANT CHANGE UP (ref 10.3–14.5)
RBC CASTS # UR COMP ASSIST: NEGATIVE /HPF — SIGNIFICANT CHANGE UP (ref 0–4)
SODIUM SERPL-SCNC: 136 MMOL/L — SIGNIFICANT CHANGE UP (ref 135–145)
SP GR SPEC: 1.02 — SIGNIFICANT CHANGE UP (ref 1.01–1.02)
URATE CRY FLD QL MICRO: ABNORMAL
UROBILINOGEN FLD QL: NEGATIVE MG/DL — SIGNIFICANT CHANGE UP
WBC # BLD: 9.28 K/UL — SIGNIFICANT CHANGE UP (ref 3.8–10.5)
WBC # FLD AUTO: 9.28 K/UL — SIGNIFICANT CHANGE UP (ref 3.8–10.5)
WBC UR QL: SIGNIFICANT CHANGE UP

## 2019-04-19 PROCEDURE — 99285 EMERGENCY DEPT VISIT HI MDM: CPT

## 2019-04-19 RX ORDER — MEROPENEM 1 G/30ML
INJECTION INTRAVENOUS
Qty: 0 | Refills: 0 | Status: DISCONTINUED | OUTPATIENT
Start: 2019-04-19 | End: 2019-04-19

## 2019-04-19 RX ORDER — SERTRALINE 25 MG/1
0 TABLET, FILM COATED ORAL
Qty: 0 | Refills: 0 | COMMUNITY

## 2019-04-19 RX ORDER — SERTRALINE 25 MG/1
100 TABLET, FILM COATED ORAL DAILY
Qty: 0 | Refills: 0 | Status: DISCONTINUED | OUTPATIENT
Start: 2019-04-19 | End: 2019-04-21

## 2019-04-19 RX ORDER — MEMANTINE HYDROCHLORIDE 10 MG/1
5 TABLET ORAL
Qty: 0 | Refills: 0 | Status: DISCONTINUED | OUTPATIENT
Start: 2019-04-19 | End: 2019-04-22

## 2019-04-19 RX ORDER — SODIUM CHLORIDE 9 MG/ML
1000 INJECTION INTRAMUSCULAR; INTRAVENOUS; SUBCUTANEOUS
Qty: 0 | Refills: 0 | Status: DISCONTINUED | OUTPATIENT
Start: 2019-04-19 | End: 2019-04-19

## 2019-04-19 RX ORDER — MEROPENEM 1 G/30ML
1000 INJECTION INTRAVENOUS EVERY 12 HOURS
Qty: 0 | Refills: 0 | Status: DISCONTINUED | OUTPATIENT
Start: 2019-04-19 | End: 2019-04-22

## 2019-04-19 RX ORDER — SODIUM CHLORIDE 9 MG/ML
1000 INJECTION INTRAMUSCULAR; INTRAVENOUS; SUBCUTANEOUS
Qty: 0 | Refills: 0 | Status: DISCONTINUED | OUTPATIENT
Start: 2019-04-19 | End: 2019-04-20

## 2019-04-19 RX ORDER — DONEPEZIL HYDROCHLORIDE 10 MG/1
1 TABLET, FILM COATED ORAL
Qty: 0 | Refills: 0 | COMMUNITY

## 2019-04-19 RX ORDER — CARBIDOPA AND LEVODOPA 25; 100 MG/1; MG/1
1 TABLET ORAL
Qty: 0 | Refills: 0 | Status: DISCONTINUED | OUTPATIENT
Start: 2019-04-19 | End: 2019-04-22

## 2019-04-19 RX ORDER — DONEPEZIL HYDROCHLORIDE 10 MG/1
0 TABLET, FILM COATED ORAL
Qty: 0 | Refills: 0 | COMMUNITY

## 2019-04-19 RX ORDER — MEROPENEM 1 G/30ML
1000 INJECTION INTRAVENOUS ONCE
Qty: 0 | Refills: 0 | Status: COMPLETED | OUTPATIENT
Start: 2019-04-19 | End: 2019-04-19

## 2019-04-19 RX ORDER — BUPROPION HYDROCHLORIDE 150 MG/1
300 TABLET, EXTENDED RELEASE ORAL DAILY
Qty: 0 | Refills: 0 | Status: DISCONTINUED | OUTPATIENT
Start: 2019-04-19 | End: 2019-04-22

## 2019-04-19 RX ORDER — CARBIDOPA AND LEVODOPA 25; 100 MG/1; MG/1
1 TABLET ORAL
Qty: 0 | Refills: 0 | COMMUNITY

## 2019-04-19 RX ORDER — DONEPEZIL HYDROCHLORIDE 10 MG/1
10 TABLET, FILM COATED ORAL AT BEDTIME
Qty: 0 | Refills: 0 | Status: DISCONTINUED | OUTPATIENT
Start: 2019-04-19 | End: 2019-04-22

## 2019-04-19 RX ORDER — MIDODRINE HYDROCHLORIDE 2.5 MG/1
5 TABLET ORAL
Qty: 0 | Refills: 0 | Status: DISCONTINUED | OUTPATIENT
Start: 2019-04-19 | End: 2019-04-20

## 2019-04-19 RX ORDER — MEROPENEM 1 G/30ML
1000 INJECTION INTRAVENOUS EVERY 8 HOURS
Qty: 0 | Refills: 0 | Status: DISCONTINUED | OUTPATIENT
Start: 2019-04-19 | End: 2019-04-19

## 2019-04-19 RX ORDER — ENOXAPARIN SODIUM 100 MG/ML
40 INJECTION SUBCUTANEOUS EVERY 24 HOURS
Qty: 0 | Refills: 0 | Status: DISCONTINUED | OUTPATIENT
Start: 2019-04-19 | End: 2019-04-22

## 2019-04-19 RX ORDER — FOLIC ACID 0.8 MG
1 TABLET ORAL DAILY
Qty: 0 | Refills: 0 | Status: DISCONTINUED | OUTPATIENT
Start: 2019-04-19 | End: 2019-04-22

## 2019-04-19 RX ADMIN — MEMANTINE HYDROCHLORIDE 5 MILLIGRAM(S): 10 TABLET ORAL at 19:03

## 2019-04-19 RX ADMIN — Medication 0.5 MILLIGRAM(S): at 18:30

## 2019-04-19 RX ADMIN — SODIUM CHLORIDE 125 MILLILITER(S): 9 INJECTION INTRAMUSCULAR; INTRAVENOUS; SUBCUTANEOUS at 15:51

## 2019-04-19 RX ADMIN — CARBIDOPA AND LEVODOPA 1 TABLET(S): 25; 100 TABLET ORAL at 23:15

## 2019-04-19 RX ADMIN — MEROPENEM 100 MILLIGRAM(S): 1 INJECTION INTRAVENOUS at 15:50

## 2019-04-19 RX ADMIN — DONEPEZIL HYDROCHLORIDE 10 MILLIGRAM(S): 10 TABLET, FILM COATED ORAL at 23:16

## 2019-04-19 RX ADMIN — ENOXAPARIN SODIUM 40 MILLIGRAM(S): 100 INJECTION SUBCUTANEOUS at 18:29

## 2019-04-19 NOTE — H&P ADULT - NSICDXPASTSURGICALHX_GEN_ALL_CORE_FT
PAST SURGICAL HISTORY:  Detached Retina, Left and macular hole 5/2011    Macular Hole 2010    Retinal Tear 2003    S/P Cataract Surgery 2011

## 2019-04-19 NOTE — ED ADULT NURSE REASSESSMENT NOTE - NS ED NURSE REASSESS COMMENT FT1
Charge rounding performed by nursing staff. Spoke to patient and family and updated them on their status, the current plan of care, and their current course of care. Answered any and all questions related to the care plan and current status. Patient expressed no needs at this time.
pt received from previous RN MAGGY Irvin, forgetful, denies pain. POC reviewed with pt, verbalizes understanding. call bell in reach, instructed to call for assistance. vss. no s/s of acute distress noted. will continue to monitor

## 2019-04-19 NOTE — ED PROVIDER NOTE - OBJECTIVE STATEMENT
73 y/o female with PMHx of Anxiety, Parkinson's Disease presenting to the ED c/o generalized weakness, sent in for evaluation of ESBL in urine. Pt was seen by PCP a few days ago for weakness and was told she had a UTI with ESBL. Pt typically able to walk with assistance but currently unable to ambulate. Pt has not had a BM in a few days. Denies fever, NVD, or other sx.  PCP: Dr. Dela Cruz.

## 2019-04-19 NOTE — H&P ADULT - HISTORY OF PRESENT ILLNESS
73 y/o female with PMHx of Anxiety, Parkinson's Disease presenting to the ED c/o generalized weakness, sent in by her PCP dr Obrien for evaluation of ESBL in urine. Pt was seen by PCP a few days ago for weakness and was told she had a UTI with ESBL. Pt typically able to walk with assistance but currently unable to ambulate. Pt has not had a BM in a few days. Denies fever, NVD, or  abdominal pain or chest pain  patient was started on meropenem and IVF in ed  she remians afebrile   Family hx- no hx of heart disease or cancer in 1st degree relatives  social hx- never smoker, no etoh use, no recreational drug use, lives with

## 2019-04-19 NOTE — H&P ADULT - NSHPPHYSICALEXAM_GEN_ALL_CORE
PHYSICAL EXAM:    Daily Height in cm: 167.64 (19 Apr 2019 12:55)    Daily     ICU Vital Signs Last 24 Hrs  T(C): 37.1 (19 Apr 2019 13:19), Max: 37.1 (19 Apr 2019 13:19)  T(F): 98.7 (19 Apr 2019 13:19), Max: 98.7 (19 Apr 2019 13:19)  HR: 91 (19 Apr 2019 13:19) (91 - 91)  BP: 153/85 (19 Apr 2019 13:19) (153/85 - 153/85)  BP(mean): --  ABP: --  ABP(mean): --  RR: 18 (19 Apr 2019 13:19) (18 - 18)  SpO2: 95% (19 Apr 2019 13:19) (95% - 95%)      Constitutional: Well appearing, NAD  HEENT: Atraumatic, CAR, Normal, No congestion  Respiratory: Breath Sounds normal, no rhonchi/wheeze  Cardiovascular: N S1S2;   Gastrointestinal: Abdomen soft, non tender, Bowel Ssounds present  Extremities: No edema,  Neurological: awake, alert follows commands, moves all extremities , no gross facial symmetry    Back: No CVA tenderness   Musculoskeletal: non tender  Breasts: Deferred  Genitourinary: deferred  Rectal: Deferred

## 2019-04-19 NOTE — H&P ADULT - ASSESSMENT
75 y/o female with PMHx of Anxiety, Parkinson's Disease presenting to the ED c/o generalized weakness, sent in by her PCP dr Obrien for evaluation of ESBL in urine. Pt was seen by PCP a few days ago for weakness and was told she had a UTI with ESBL. Pt typically able to walk with assistance but currently unable to ambulate. Pt has not had a BM in a few days. Denies fever, NVD, or  abdominal pain or chest pain  patient was started on meropenem and IVF in ed    A/P  # ESBL UTI  # generalised weakness/dehydration   # Acute mild prerenal azotemia likely from dehydration    Admit to med surg   _IV meropenem  IVF  ID consult  obtain ucx results from outpatient   PT    #hx anxiety/parkinsons dementia  resume home meds    # DVT prophylaxis- lovenox SC    IMPROVE VTE Individual Risk Assessment    RISK                                                                Points    [  ] Previous VTE                                                  3    [  ] Thrombophilia                                               2    [  ] Lower limb paralysis                                      2        (unable to hold up >15 seconds)      [  ] Current Cancer                                              2         (within 6 months)    [  ] Immobilization > 24 hrs                                1    [  ] ICU/CCU stay > 24 hours                              1    [  ] Age > 60                                                      1    IMPROVE VTE Score ____2_____    IMPROVE Score 0-1: Low Risk, No VTE prophylaxis required for most patients, encourage ambulation.   IMPROVE Score 2-3: At risk, pharmacologic VTE prophylaxis is indicated for most patients (in the absence of a contraindication)  IMPROVE Score > or = 4: High Risk, pharmacologic VTE prophylaxis is indicated for most patients (in the absence of a contraindication) 75 y/o female with PMHx of Anxiety, Parkinson's Disease presenting to the ED c/o generalized weakness, sent in by her PCP dr Obrien for evaluation of ESBL in urine. Pt was seen by PCP a few days ago for weakness and was told she had a UTI with ESBL. Pt typically able to walk with assistance but currently unable to ambulate. Pt has not had a BM in a few days. Denies fever, NVD, or  abdominal pain or chest pain  patient was started on meropenem and IVF in ed    A/P  # ESBL UTI/clinically does not meet criteria for sepsis  # generalised weakness/dehydration   # Acute mild prerenal azotemia likely from dehydration    Admit to med surg   _IV meropenem  IVF  ID consult  obtain ucx results from outpatient   PT    #hx anxiety/parkinsons dementia  resume home meds    # DVT prophylaxis- lovenox SC    IMPROVE VTE Individual Risk Assessment    RISK                                                                Points    [  ] Previous VTE                                                  3    [  ] Thrombophilia                                               2    [  ] Lower limb paralysis                                      2        (unable to hold up >15 seconds)      [  ] Current Cancer                                              2         (within 6 months)    [  ] Immobilization > 24 hrs                                1    [  ] ICU/CCU stay > 24 hours                              1    [  ] Age > 60                                                      1    IMPROVE VTE Score ____2_____    IMPROVE Score 0-1: Low Risk, No VTE prophylaxis required for most patients, encourage ambulation.   IMPROVE Score 2-3: At risk, pharmacologic VTE prophylaxis is indicated for most patients (in the absence of a contraindication)  IMPROVE Score > or = 4: High Risk, pharmacologic VTE prophylaxis is indicated for most patients (in the absence of a contraindication)

## 2019-04-19 NOTE — H&P ADULT - NSHPLABSRESULTS_GEN_ALL_CORE
13.1   9.28  )-----------( 253      ( 2019 14:06 )             40.1       CBC Full  -  ( 2019 14:06 )  WBC Count : 9.28 K/uL  RBC Count : 4.28 M/uL  Hemoglobin : 13.1 g/dL  Hematocrit : 40.1 %  Platelet Count - Automated : 253 K/uL  Mean Cell Volume : 93.7 fl  Mean Cell Hemoglobin : 30.6 pg  Mean Cell Hemoglobin Concentration : 32.7 gm/dL  Auto Neutrophil # : 7.52 K/uL  Auto Lymphocyte # : 0.88 K/uL  Auto Monocyte # : 0.77 K/uL  Auto Eosinophil # : 0.02 K/uL  Auto Basophil # : 0.06 K/uL  Auto Neutrophil % : 81.1 %  Auto Lymphocyte % : 9.5 %  Auto Monocyte % : 8.3 %  Auto Eosinophil % : 0.2 %  Auto Basophil % : 0.6 %          136  |  103  |  20  ----------------------------<  92  5.1   |  24  |  1.32<H>    Ca    9.1      2019 14:06    TPro  7.5  /  Alb  4.2  /  TBili  0.5  /  DBili  x   /  AST  39<H>  /  ALT  8<L>  /  AlkPhos  78  04-19      LIVER FUNCTIONS - ( 2019 14:06 )  Alb: 4.2 g/dL / Pro: 7.5 gm/dL / ALK PHOS: 78 U/L / ALT: 8 U/L / AST: 39 U/L / GGT: x                       Urinalysis Basic - ( 2019 14:06 )    Color: Yellow / Appearance: Clear / S.025 / pH: x  Gluc: x / Ketone: Trace  / Bili: Negative / Urobili: Negative mg/dL   Blood: x / Protein: 15 mg/dL / Nitrite: Positive   Leuk Esterase: Small / RBC: Negative /HPF / WBC 0-2   Sq Epi: x / Non Sq Epi: x / Bacteria: Many            MEDICATIONS  (STANDING):  meropenem  IVPB      meropenem  IVPB 1000 milliGRAM(s) IV Intermittent every 8 hours  sodium chloride 0.9%. 1000 milliLiter(s) (125 mL/Hr) IV Continuous <Continuous>

## 2019-04-19 NOTE — ED ADULT NURSE NOTE - NSIMPLEMENTINTERV_GEN_ALL_ED
Implemented All Fall with Harm Risk Interventions:  [place] to call system. Call bell, personal items and telephone within reach. Instruct patient to call for assistance. Room bathroom lighting operational. Non-slip footwear when patient is off stretcher. Physically safe environment: no spills, clutter or unnecessary equipment. Stretcher in lowest position, wheels locked, appropriate side rails in place. Provide visual cue, wrist band, yellow gown, etc. Monitor gait and stability. Monitor for mental status changes and reorient to person, place, and time. Review medications for side effects contributing to fall risk. Reinforce activity limits and safety measures with patient and family. Provide visual clues: red socks.

## 2019-04-19 NOTE — ED STATDOCS - PROGRESS NOTE DETAILS
Will VENTURA for ED attending, Dr. Copeland: 75 y/o F with PMHx of Anxiety, Parkinson's Disease presenting to the ED c/o generalized weakness, sent in for evaluation of ESBL in urine. Pt typically able to walk with assistance but currently unable to ambulate. Will send patient to main ED for further evaluation.

## 2019-04-20 LAB
ALBUMIN SERPL ELPH-MCNC: 3.5 G/DL — SIGNIFICANT CHANGE UP (ref 3.3–5)
ALP SERPL-CCNC: 70 U/L — SIGNIFICANT CHANGE UP (ref 40–120)
ALT FLD-CCNC: 8 U/L — LOW (ref 12–78)
ANION GAP SERPL CALC-SCNC: 9 MMOL/L — SIGNIFICANT CHANGE UP (ref 5–17)
AST SERPL-CCNC: 17 U/L — SIGNIFICANT CHANGE UP (ref 15–37)
BASOPHILS # BLD AUTO: 0.06 K/UL — SIGNIFICANT CHANGE UP (ref 0–0.2)
BASOPHILS NFR BLD AUTO: 0.9 % — SIGNIFICANT CHANGE UP (ref 0–2)
BILIRUB SERPL-MCNC: 0.4 MG/DL — SIGNIFICANT CHANGE UP (ref 0.2–1.2)
BUN SERPL-MCNC: 17 MG/DL — SIGNIFICANT CHANGE UP (ref 7–23)
CALCIUM SERPL-MCNC: 8.4 MG/DL — LOW (ref 8.5–10.1)
CHLORIDE SERPL-SCNC: 107 MMOL/L — SIGNIFICANT CHANGE UP (ref 96–108)
CO2 SERPL-SCNC: 24 MMOL/L — SIGNIFICANT CHANGE UP (ref 22–31)
CREAT SERPL-MCNC: 1.05 MG/DL — SIGNIFICANT CHANGE UP (ref 0.5–1.3)
EOSINOPHIL # BLD AUTO: 0.06 K/UL — SIGNIFICANT CHANGE UP (ref 0–0.5)
EOSINOPHIL NFR BLD AUTO: 0.9 % — SIGNIFICANT CHANGE UP (ref 0–6)
GLUCOSE SERPL-MCNC: 91 MG/DL — SIGNIFICANT CHANGE UP (ref 70–99)
HCT VFR BLD CALC: 37.5 % — SIGNIFICANT CHANGE UP (ref 34.5–45)
HCV AB S/CO SERPL IA: 0.12 S/CO — SIGNIFICANT CHANGE UP (ref 0–0.99)
HCV AB SERPL-IMP: SIGNIFICANT CHANGE UP
HGB BLD-MCNC: 12 G/DL — SIGNIFICANT CHANGE UP (ref 11.5–15.5)
IMM GRANULOCYTES NFR BLD AUTO: 0.5 % — SIGNIFICANT CHANGE UP (ref 0–1.5)
LYMPHOCYTES # BLD AUTO: 1.44 K/UL — SIGNIFICANT CHANGE UP (ref 1–3.3)
LYMPHOCYTES # BLD AUTO: 22.4 % — SIGNIFICANT CHANGE UP (ref 13–44)
MCHC RBC-ENTMCNC: 30 PG — SIGNIFICANT CHANGE UP (ref 27–34)
MCHC RBC-ENTMCNC: 32 GM/DL — SIGNIFICANT CHANGE UP (ref 32–36)
MCV RBC AUTO: 93.8 FL — SIGNIFICANT CHANGE UP (ref 80–100)
MONOCYTES # BLD AUTO: 0.69 K/UL — SIGNIFICANT CHANGE UP (ref 0–0.9)
MONOCYTES NFR BLD AUTO: 10.7 % — SIGNIFICANT CHANGE UP (ref 2–14)
NEUTROPHILS # BLD AUTO: 4.14 K/UL — SIGNIFICANT CHANGE UP (ref 1.8–7.4)
NEUTROPHILS NFR BLD AUTO: 64.6 % — SIGNIFICANT CHANGE UP (ref 43–77)
NRBC # BLD: 0 /100 WBCS — SIGNIFICANT CHANGE UP (ref 0–0)
PLATELET # BLD AUTO: 234 K/UL — SIGNIFICANT CHANGE UP (ref 150–400)
POTASSIUM SERPL-MCNC: 4.2 MMOL/L — SIGNIFICANT CHANGE UP (ref 3.5–5.3)
POTASSIUM SERPL-SCNC: 4.2 MMOL/L — SIGNIFICANT CHANGE UP (ref 3.5–5.3)
PROT SERPL-MCNC: 6.5 GM/DL — SIGNIFICANT CHANGE UP (ref 6–8.3)
RBC # BLD: 4 M/UL — SIGNIFICANT CHANGE UP (ref 3.8–5.2)
RBC # FLD: 13.2 % — SIGNIFICANT CHANGE UP (ref 10.3–14.5)
SODIUM SERPL-SCNC: 140 MMOL/L — SIGNIFICANT CHANGE UP (ref 135–145)
WBC # BLD: 6.42 K/UL — SIGNIFICANT CHANGE UP (ref 3.8–10.5)
WBC # FLD AUTO: 6.42 K/UL — SIGNIFICANT CHANGE UP (ref 3.8–10.5)

## 2019-04-20 RX ORDER — MIDODRINE HYDROCHLORIDE 2.5 MG/1
5 TABLET ORAL
Qty: 0 | Refills: 0 | Status: DISCONTINUED | OUTPATIENT
Start: 2019-04-20 | End: 2019-04-21

## 2019-04-20 RX ORDER — AMLODIPINE BESYLATE 2.5 MG/1
5 TABLET ORAL ONCE
Qty: 0 | Refills: 0 | Status: COMPLETED | OUTPATIENT
Start: 2019-04-20 | End: 2019-04-20

## 2019-04-20 RX ADMIN — MEROPENEM 100 MILLIGRAM(S): 1 INJECTION INTRAVENOUS at 17:30

## 2019-04-20 RX ADMIN — BUPROPION HYDROCHLORIDE 300 MILLIGRAM(S): 150 TABLET, EXTENDED RELEASE ORAL at 14:04

## 2019-04-20 RX ADMIN — CARBIDOPA AND LEVODOPA 1 TABLET(S): 25; 100 TABLET ORAL at 17:29

## 2019-04-20 RX ADMIN — DONEPEZIL HYDROCHLORIDE 10 MILLIGRAM(S): 10 TABLET, FILM COATED ORAL at 21:21

## 2019-04-20 RX ADMIN — MIDODRINE HYDROCHLORIDE 5 MILLIGRAM(S): 2.5 TABLET ORAL at 06:19

## 2019-04-20 RX ADMIN — CARBIDOPA AND LEVODOPA 1 TABLET(S): 25; 100 TABLET ORAL at 09:57

## 2019-04-20 RX ADMIN — CARBIDOPA AND LEVODOPA 1 TABLET(S): 25; 100 TABLET ORAL at 06:19

## 2019-04-20 RX ADMIN — CARBIDOPA AND LEVODOPA 1 TABLET(S): 25; 100 TABLET ORAL at 23:29

## 2019-04-20 RX ADMIN — MEMANTINE HYDROCHLORIDE 5 MILLIGRAM(S): 10 TABLET ORAL at 17:30

## 2019-04-20 RX ADMIN — CARBIDOPA AND LEVODOPA 1 TABLET(S): 25; 100 TABLET ORAL at 14:06

## 2019-04-20 RX ADMIN — Medication 0.5 MILLIGRAM(S): at 17:29

## 2019-04-20 RX ADMIN — AMLODIPINE BESYLATE 5 MILLIGRAM(S): 2.5 TABLET ORAL at 11:47

## 2019-04-20 RX ADMIN — SERTRALINE 100 MILLIGRAM(S): 25 TABLET, FILM COATED ORAL at 14:04

## 2019-04-20 RX ADMIN — CARBIDOPA AND LEVODOPA 1 TABLET(S): 25; 100 TABLET ORAL at 21:21

## 2019-04-20 RX ADMIN — MEROPENEM 100 MILLIGRAM(S): 1 INJECTION INTRAVENOUS at 06:18

## 2019-04-20 RX ADMIN — Medication 1 MILLIGRAM(S): at 14:04

## 2019-04-20 RX ADMIN — MEMANTINE HYDROCHLORIDE 5 MILLIGRAM(S): 10 TABLET ORAL at 06:19

## 2019-04-20 RX ADMIN — Medication 0.5 MILLIGRAM(S): at 06:19

## 2019-04-20 NOTE — PHYSICAL THERAPY INITIAL EVALUATION ADULT - BED MOBILITY LIMITATIONS, REHAB EVAL
++retropulsion in sitting, pt not following directions to increase hip/knee flexion in sititng, pt remaining stiff and pushing back posteriorly/impaired ability to control trunk for mobility impaired ability to control trunk for mobility/++retropulsion in sitting, pt not following directions to increase hip/knee flexion in sitting, pt remaining stiff and pushing back posteriorly

## 2019-04-20 NOTE — PHYSICAL THERAPY INITIAL EVALUATION ADULT - ADDITIONAL COMMENTS
1 RAMYA, has HHA 4x/week for 8 hrs/day.  has a walk in shower with grab bars and shower chair, has a w/c and rollator

## 2019-04-20 NOTE — PHYSICAL THERAPY INITIAL EVALUATION ADULT - MUSCLE TONE ASSESSMENT, REHAB EVAL
+ rigidity BUE/BLEs, pt keepsing LEs "stiff" in extension throughout most of treatment session, + resting tremors

## 2019-04-20 NOTE — CONSULT NOTE ADULT - SUBJECTIVE AND OBJECTIVE BOX
Patient is a 74y old  Female who presents with a chief complaint of sent in by PCP for ESBL UTI tested as outpatient and has generalised weakness (2019 09:41)    HPI:  75 y/o female with PMHx of Anxiety, Parkinson's Disease presenting to the ED c/o generalized weakness, sent in by her PCP dr Obrien for evaluation of ESBL in urine. Pt was seen by PCP a few days ago for weakness and was told she had a UTI with ESBL. Pt typically able to walk with assistance but currently unable to ambulate. Pt has not had a BM in a few days. Denies fever, NVD, or  abdominal pain or chest pain Patient was started on meropenem and IVF in ed.         PMH: as above  PSH: as above  Meds: per reconciliation sheet, noted below  MEDICATIONS  (STANDING):  amLODIPine   Tablet 5 milliGRAM(s) Oral once  buPROPion XL . 300 milliGRAM(s) Oral daily  carbidopa/levodopa  25/100 1 Tablet(s) Oral <User Schedule>  carbidopa/levodopa CR 50/200 1 Tablet(s) Oral <User Schedule>  donepezil 10 milliGRAM(s) Oral at bedtime  enoxaparin Injectable 40 milliGRAM(s) SubCutaneous every 24 hours  folic acid 1 milliGRAM(s) Oral daily  LORazepam     Tablet 0.5 milliGRAM(s) Oral two times a day  memantine 5 milliGRAM(s) Oral two times a day  meropenem  IVPB 1000 milliGRAM(s) IV Intermittent every 12 hours  midodrine 5 milliGRAM(s) Oral <User Schedule>  sertraline 100 milliGRAM(s) Oral daily      Allergies    No Known Allergies    Intolerances      Social: no smoking, no alcohol, no illegal drugs; no recent travel, no exposure to TB  FAMILY HISTORY:     no history of premature cardiovascular disease in first degree relatives    ROS: the patient denies fever, no chills, no HA, no dizziness, no sore throat, no blurry vision, no CP, no palpitations, no SOB, no cough, no abdominal pain, no diarrhea, no N/V, no dysuria, no leg pain, no claudication, no rash, no joint aches, no rectal pain or bleeding, no night sweats  All other systems reviewed and are negative    Vital Signs Last 24 Hrs  T(C): 37.3 (2019 11:05), Max: 37.3 (2019 11:05)  T(F): 99.2 (2019 11:05), Max: 99.2 (2019 11:05)  HR: 73 (2019 11:05) (73 - 91)  BP: 178/69 (2019 11:05) (140/92 - 178/69)  BP(mean): --  RR: 17 (2019 11:05) (17 - 18)  SpO2: 99% (2019 11:05) (95% - 100%)  Daily Height in cm: 167.64 (2019 12:55)        PE:  Constitutional: frail looking  HEENT: NC/AT, EOMI, PERRLA, conjunctivae clear; ears and nose atraumatic; pharynx benign  Neck: supple; thyroid not palpable  Back: no tenderness  Respiratory: respiratory effort normal; clear to auscultation  Cardiovascular: S1S2 regular, no murmurs  Abdomen: soft, not tender, not distended, positive BS; liver and spleen WNL  Genitourinary: no suprapubic tenderness  Lymphatic: no LN palpable  Musculoskeletal: no muscle tenderness, no joint swelling or tenderness  Extremities: no pedal edema  Neurological/ Psychiatric:  moving all extremities  Skin: no rashes; no palpable lesions    Labs: all available labs reviewed                        12.0   6.42  )-----------( 234      ( 2019 07:01 )             37.5     04-20    140  |  107  |  17  ----------------------------<  91  4.2   |  24  |  1.05    Ca    8.4<L>      2019 07:01    TPro  6.5  /  Alb  3.5  /  TBili  0.4  /  DBili  x   /  AST  17  /  ALT  8<L>  /  AlkPhos  70  04-20     LIVER FUNCTIONS - ( 2019 07:01 )  Alb: 3.5 g/dL / Pro: 6.5 gm/dL / ALK PHOS: 70 U/L / ALT: 8 U/L / AST: 17 U/L / GGT: x           Urinalysis Basic - ( 2019 14:06 )    Color: Yellow / Appearance: Clear / S.025 / pH: x  Gluc: x / Ketone: Trace  / Bili: Negative / Urobili: Negative mg/dL   Blood: x / Protein: 15 mg/dL / Nitrite: Positive   Leuk Esterase: Small / RBC: Negative /HPF / WBC 0-2   Sq Epi: x / Non Sq Epi: x / Bacteria: Many          Radiology: all available radiological tests reviewed    Advanced directives addressed: full resuscitation

## 2019-04-20 NOTE — CONSULT NOTE ADULT - ASSESSMENT
75 y/o female with PMHx of Anxiety, Parkinson's Disease presenting to the ED c/o generalized weakness, sent in by her PCP dr Obrien for evaluation of ESBL in urine. Pt was seen by PCP a few days ago for weakness and was told she had a UTI with ESBL. Pt typically able to walk with assistance but currently unable to ambulate. Pt has not had a BM in a few days. Denies fever, NVD, or abdominal pain or chest pain Patient was started on meropenem and IVF in ed.     1. esbl ecoli cystitis. alzheimers dementia. spenser  - Cr better, improving  - agree with meropenem   - monitor temps  - tolerating abx well so far; no side effects noted  - reason for abx use and side effects reviewed with patient   - supportive care  - f/u cbc    2. other issues - care per medicine

## 2019-04-20 NOTE — PROGRESS NOTE ADULT - ASSESSMENT
75 y/o female with PMHx of Anxiety, Parkinson's Disease presenting to the ED c/o generalized weakness, sent in by her PCP dr Obrien for evaluation of ESBL in urine. Pt was seen by PCP a few days ago for weakness and was told she had a UTI with ESBL. Pt typically able to walk with assistance but currently unable to ambulate. Pt has not had a BM in a few days. Denies fever, NVD, or  abdominal pain or chest pain  patient was started on meropenem and IVF in ed    A/P  # ESBL UTI/clinically does not meet criteria for sepsis  # generalised weakness/dehydration   # Acute mild prerenal azotemia likely from dehydration- resolved with IVF    Admit to med surg   _IV meropenem  stop   IVF today, monitor BMP  ID consult  obtain ucx results from outpatient   ucx was not done in ed , meropenem was given prior to repeat Ucx in ED  PT    #hx anxiety/parkinsons dementia  resume home meds    # DVT prophylaxis- lovenox SC    IMPROVE VTE Individual Risk Assessment    RISK                                                                Points    [  ] Previous VTE                                                  3  [  ] Thrombophilia                                               2    [  ] Lower limb paralysis                                      2        (unable to hold up >15 seconds)      [  ] Current Cancer                                              2         (within 6 months)    [  ] Immobilization > 24 hrs                                1    [  ] ICU/CCU stay > 24 hours                              1    [  ] Age > 60                                                      1    IMPROVE VTE Score ____2_____    IMPROVE Score 0-1: Low Risk, No VTE prophylaxis required for most patients, encourage ambulation.   IMPROVE Score 2-3: At risk, pharmacologic VTE prophylaxis is indicated for most patients (in the absence of a contraindication)  IMPROVE Score > or = 4: High Risk, pharmacologic VTE prophylaxis is indicated for most patients (in the absence of a contraindication) 73 y/o female with PMHx of Anxiety, Parkinson's Disease presenting to the ED c/o generalized weakness, sent in by her PCP dr Obrien for evaluation of ESBL in urine. Pt was seen by PCP a few days ago for weakness and was told she had a UTI with ESBL. Pt typically able to walk with assistance but currently unable to ambulate. Pt has not had a BM in a few days. Denies fever, NVD, or  abdominal pain or chest pain  patient was started on meropenem and IVF in ed    A/P  # ESBL UTI/clinically does not meet criteria for sepsis  # generalised weakness/dehydration   # Acute mild prerenal azotemia likely from dehydration- resolved with IVF    Admit to med surg   _IV meropenem  stop   IVF today, monitor BMP  ID consult  obtain ucx results from outpatient   ucx was not done in ed , meropenem was given prior to repeat Ucx in ED  PT    # Elevated BP systolic 168 likely from IVF and from midodrine  hold midodrine for SBP>150  stop IVF for now as MICHAEL resolved  encourage po intake and monitor BMP    #hx anxiety/parkinsons dementia  resume home meds    # DVT prophylaxis- lovenox SC    IMPROVE VTE Individual Risk Assessment    RISK                                                                Points    [  ] Previous VTE                                                  3  [  ] Thrombophilia                                               2    [  ] Lower limb paralysis                                      2        (unable to hold up >15 seconds)      [  ] Current Cancer                                              2         (within 6 months)    [  ] Immobilization > 24 hrs                                1    [  ] ICU/CCU stay > 24 hours                              1    [  ] Age > 60                                                      1    IMPROVE VTE Score ____2_____    IMPROVE Score 0-1: Low Risk, No VTE prophylaxis required for most patients, encourage ambulation.   IMPROVE Score 2-3: At risk, pharmacologic VTE prophylaxis is indicated for most patients (in the absence of a contraindication)  IMPROVE Score > or = 4: High Risk, pharmacologic VTE prophylaxis is indicated for most patients (in the absence of a contraindication)

## 2019-04-20 NOTE — PHYSICAL THERAPY INITIAL EVALUATION ADULT - DISCHARGE DISPOSITION, PT EVAL
Recommend continued skilled PT and [place] when medically stable for discharge./rehabilitation facility

## 2019-04-20 NOTE — PHYSICAL THERAPY INITIAL EVALUATION ADULT - GENERAL OBSERVATIONS, REHAB EVAL
pt received/left supine in bed on 2S, call bell in reach, bed alarm on, pts  and bedside. pt confused, stating upon PTs arrival "I don't need therapy," however pt cooperative.

## 2019-04-21 RX ORDER — SERTRALINE 25 MG/1
200 TABLET, FILM COATED ORAL DAILY
Qty: 0 | Refills: 0 | Status: DISCONTINUED | OUTPATIENT
Start: 2019-04-21 | End: 2019-04-22

## 2019-04-21 RX ORDER — MAGNESIUM HYDROXIDE 400 MG/1
30 TABLET, CHEWABLE ORAL DAILY
Qty: 0 | Refills: 0 | Status: DISCONTINUED | OUTPATIENT
Start: 2019-04-21 | End: 2019-04-22

## 2019-04-21 RX ORDER — SENNA PLUS 8.6 MG/1
2 TABLET ORAL AT BEDTIME
Qty: 0 | Refills: 0 | Status: DISCONTINUED | OUTPATIENT
Start: 2019-04-21 | End: 2019-04-22

## 2019-04-21 RX ORDER — DOCUSATE SODIUM 100 MG
100 CAPSULE ORAL AT BEDTIME
Qty: 0 | Refills: 0 | Status: DISCONTINUED | OUTPATIENT
Start: 2019-04-21 | End: 2019-04-22

## 2019-04-21 RX ORDER — MIDODRINE HYDROCHLORIDE 2.5 MG/1
5 TABLET ORAL
Qty: 0 | Refills: 0 | Status: DISCONTINUED | OUTPATIENT
Start: 2019-04-21 | End: 2019-04-22

## 2019-04-21 RX ADMIN — CARBIDOPA AND LEVODOPA 1 TABLET(S): 25; 100 TABLET ORAL at 16:15

## 2019-04-21 RX ADMIN — CARBIDOPA AND LEVODOPA 1 TABLET(S): 25; 100 TABLET ORAL at 18:35

## 2019-04-21 RX ADMIN — DONEPEZIL HYDROCHLORIDE 10 MILLIGRAM(S): 10 TABLET, FILM COATED ORAL at 21:21

## 2019-04-21 RX ADMIN — SENNA PLUS 2 TABLET(S): 8.6 TABLET ORAL at 21:21

## 2019-04-21 RX ADMIN — MAGNESIUM HYDROXIDE 30 MILLILITER(S): 400 TABLET, CHEWABLE ORAL at 16:19

## 2019-04-21 RX ADMIN — BUPROPION HYDROCHLORIDE 300 MILLIGRAM(S): 150 TABLET, EXTENDED RELEASE ORAL at 13:29

## 2019-04-21 RX ADMIN — MEMANTINE HYDROCHLORIDE 5 MILLIGRAM(S): 10 TABLET ORAL at 17:03

## 2019-04-21 RX ADMIN — MEROPENEM 100 MILLIGRAM(S): 1 INJECTION INTRAVENOUS at 17:03

## 2019-04-21 RX ADMIN — Medication 100 MILLIGRAM(S): at 21:21

## 2019-04-21 RX ADMIN — CARBIDOPA AND LEVODOPA 1 TABLET(S): 25; 100 TABLET ORAL at 10:18

## 2019-04-21 RX ADMIN — Medication 0.5 MILLIGRAM(S): at 21:21

## 2019-04-21 RX ADMIN — MEROPENEM 100 MILLIGRAM(S): 1 INJECTION INTRAVENOUS at 05:51

## 2019-04-21 RX ADMIN — ENOXAPARIN SODIUM 40 MILLIGRAM(S): 100 INJECTION SUBCUTANEOUS at 17:04

## 2019-04-21 RX ADMIN — CARBIDOPA AND LEVODOPA 1 TABLET(S): 25; 100 TABLET ORAL at 13:29

## 2019-04-21 RX ADMIN — SERTRALINE 100 MILLIGRAM(S): 25 TABLET, FILM COATED ORAL at 13:29

## 2019-04-21 RX ADMIN — CARBIDOPA AND LEVODOPA 1 TABLET(S): 25; 100 TABLET ORAL at 06:57

## 2019-04-21 RX ADMIN — Medication 0.5 MILLIGRAM(S): at 05:51

## 2019-04-21 RX ADMIN — Medication 1 MILLIGRAM(S): at 13:28

## 2019-04-21 RX ADMIN — CARBIDOPA AND LEVODOPA 1 TABLET(S): 25; 100 TABLET ORAL at 21:20

## 2019-04-21 RX ADMIN — MEMANTINE HYDROCHLORIDE 5 MILLIGRAM(S): 10 TABLET ORAL at 05:51

## 2019-04-21 NOTE — PROGRESS NOTE ADULT - ASSESSMENT
73 y/o female with PMHx of Anxiety, Parkinson's Disease presenting to the ED c/o generalized weakness, sent in by her PCP dr Obrien for evaluation of ESBL in urine. Pt was seen by PCP a few days ago for weakness and was told she had a UTI with ESBL. Pt typically able to walk with assistance but currently unable to ambulate. Pt has not had a BM in a few days. Denies fever, NVD, or  abdominal pain or chest pain  patient was started on meropenem and IVF in ed    A/P  # ESBL UTI/clinically does not meet criteria for sepsis  # generalised weakness/dehydration   # Acute mild prerenal azotemia likely from dehydration- resolved with IVF    Admit to med surg   _IV meropenem  off IVF as MICHAEL resolved   ID consult  obtain ucx results from outpatient   ucx was not done in ed , meropenem was given prior to repeat Ucx in ED  PT    # Elevated BP systolic 168 likely from IVF and from midodrine  hold midodrine for SBP>110  stop IVF for now as MICHAEL resolved  encourage po intake and monitor BMP    #hx anxiety/parkinsons dementia  resume home meds    # DVT prophylaxis- lovenox SC    IMPROVE VTE Individual Risk Assessment    RISK                                                                Points    [  ] Previous VTE                                                  3  [  ] Thrombophilia                                               2    [  ] Lower limb paralysis                                      2        (unable to hold up >15 seconds)      [  ] Current Cancer                                              2         (within 6 months)[  ] Immobilization > 24 hrs                                1    [  ] ICU/CCU stay > 24 hours                              1    [  ] Age > 60                                                      1    IMPROVE VTE Score ____2_____    IMPROVE Score 0-1: Low Risk, No VTE prophylaxis required for most patients, encourage ambulation.   IMPROVE Score 2-3: At risk, pharmacologic VTE prophylaxis is indicated for most patients (in the absence of a contraindication)  IMPROVE Score > or = 4: High Risk, pharmacologic VTE prophylaxis is indicated for most patients (in the absence of a contraindication)

## 2019-04-22 ENCOUNTER — TRANSCRIPTION ENCOUNTER (OUTPATIENT)
Age: 74
End: 2019-04-22

## 2019-04-22 VITALS
DIASTOLIC BLOOD PRESSURE: 84 MMHG | SYSTOLIC BLOOD PRESSURE: 120 MMHG | HEART RATE: 87 BPM | RESPIRATION RATE: 16 BRPM | TEMPERATURE: 98 F | OXYGEN SATURATION: 95 %

## 2019-04-22 RX ORDER — CEFTRIAXONE 500 MG/1
1000 INJECTION, POWDER, FOR SOLUTION INTRAMUSCULAR; INTRAVENOUS EVERY 24 HOURS
Qty: 0 | Refills: 0 | Status: DISCONTINUED | OUTPATIENT
Start: 2019-04-22 | End: 2019-04-22

## 2019-04-22 RX ORDER — MIDODRINE HYDROCHLORIDE 2.5 MG/1
1 TABLET ORAL
Qty: 0 | Refills: 0 | DISCHARGE
Start: 2019-04-22

## 2019-04-22 RX ORDER — MAGNESIUM HYDROXIDE 400 MG/1
30 TABLET, CHEWABLE ORAL
Qty: 0 | Refills: 0 | DISCHARGE
Start: 2019-04-22

## 2019-04-22 RX ORDER — SENNA PLUS 8.6 MG/1
2 TABLET ORAL
Qty: 0 | Refills: 0 | DISCHARGE
Start: 2019-04-22

## 2019-04-22 RX ORDER — DOCUSATE SODIUM 100 MG
1 CAPSULE ORAL
Qty: 0 | Refills: 0 | DISCHARGE
Start: 2019-04-22

## 2019-04-22 RX ORDER — MIDODRINE HYDROCHLORIDE 2.5 MG/1
2 TABLET ORAL
Qty: 0 | Refills: 0 | DISCHARGE
Start: 2019-04-22

## 2019-04-22 RX ORDER — MIDODRINE HYDROCHLORIDE 2.5 MG/1
1 TABLET ORAL
Qty: 0 | Refills: 0 | COMMUNITY

## 2019-04-22 RX ADMIN — Medication 1 MILLIGRAM(S): at 13:27

## 2019-04-22 RX ADMIN — SERTRALINE 200 MILLIGRAM(S): 25 TABLET, FILM COATED ORAL at 13:27

## 2019-04-22 RX ADMIN — Medication 0.5 MILLIGRAM(S): at 10:04

## 2019-04-22 RX ADMIN — CARBIDOPA AND LEVODOPA 1 TABLET(S): 25; 100 TABLET ORAL at 06:17

## 2019-04-22 RX ADMIN — CEFTRIAXONE 1000 MILLIGRAM(S): 500 INJECTION, POWDER, FOR SOLUTION INTRAMUSCULAR; INTRAVENOUS at 13:26

## 2019-04-22 RX ADMIN — MEMANTINE HYDROCHLORIDE 5 MILLIGRAM(S): 10 TABLET ORAL at 06:17

## 2019-04-22 RX ADMIN — CARBIDOPA AND LEVODOPA 1 TABLET(S): 25; 100 TABLET ORAL at 16:01

## 2019-04-22 RX ADMIN — BUPROPION HYDROCHLORIDE 300 MILLIGRAM(S): 150 TABLET, EXTENDED RELEASE ORAL at 13:27

## 2019-04-22 RX ADMIN — CARBIDOPA AND LEVODOPA 1 TABLET(S): 25; 100 TABLET ORAL at 13:27

## 2019-04-22 RX ADMIN — CARBIDOPA AND LEVODOPA 1 TABLET(S): 25; 100 TABLET ORAL at 10:04

## 2019-04-22 RX ADMIN — MEROPENEM 100 MILLIGRAM(S): 1 INJECTION INTRAVENOUS at 06:17

## 2019-04-22 NOTE — DISCHARGE NOTE PROVIDER - HOSPITAL COURSE
73 y/o female with PMHx of Anxiety, Parkinson's Disease presenting to the ED c/o generalized weakness, sent in by her PCP dr Obrien for evaluation of ESBL in urine. Pt was seen by PCP a few days ago for weakness and was told she had a UTI with ESBL. Pt typically able to walk with assistance but currently unable to ambulate. Pt has not had a BM in a few days. Denies fever, NVD, or  abdominal pain or chest pain    patient was started on meropenem and IVF in ed            4/21-she remains afebrile, denies abdominal pain, no nausea ,no vomiting, reports she feel good today    4/22-patient afebrile, ucx grew ecoli no esbl, received 3 days of meropenem,now switch to 4 more days of ceftin per ID        Constitutional: Well appearing, NAD, sitting up and  watching TV, says she is enjoying the baseball game    	HEENT: Atraumatic, CAR, Normal, No congestion    	Respiratory: Breath Sounds normal, no rhonchi/wheeze    	Cardiovascular: N S1S2;     	Gastrointestinal: Abdomen soft, non tender, Bowel Ssounds present    	Extremities: No edema,    	Neurological: awake, alert follows commands, moves all extremities , no gross facial symmetry        	Back: No CVA tenderness     	Musculoskeletal: non tenderBreasts: Deferred    	Genitourinary: deferred        73 y/o female with PMHx of Anxiety, Parkinson's Disease presenting to the ED c/o generalized weakness, sent in by her PCP dr Obrien for evaluation of ESBL in urine. Pt was seen by PCP a few days ago for weakness and was told she had a UTI with ESBL. Pt typically able to walk with assistance but currently unable to ambulate. Pt has not had a BM in a few days. Denies fever, NVD, or  abdominal pain or chest pain    patient was started on meropenem and IVF in ed        A/P    # ESBL UTI detected as outpatient/clinically did not  meet criteria for sepsis    # generalised weakness/dehydration     # Acute mild prerenal azotemia likely from dehydration- resolved with IVF    # repeat Urine cx in hospital showed ecoli in ucx , but no ESBL        medically stable fro discharge     as per  at bedside congnition  close to baseline     _s/p 3 days of IV meropenem, now switched to ceftin for 4 more days to complete total 7 day course    off IVF as MICHAEL resolved     ID consult appreciated             # transeintly patient had elevated SBP upto 170 likely from IVf and  home midodrine    now midodrine to be admisnsitred only if SBP>110    continue home antiparkinsons meds which may cause low BP, needs monitoring        i discussed with  at bedside and ID     discharge time 55 mins     discussed with  and RN     plan to dischareg to [facility] rehab

## 2019-04-22 NOTE — DISCHARGE NOTE NURSING/CASE MANAGEMENT/SOCIAL WORK - NSDCDPATPORTLINK_GEN_ALL_CORE
You can access the [facility] Patient Portal, offered by [facility], by registering with the following website: [email or web address]

## 2019-04-22 NOTE — PROGRESS NOTE ADULT - ASSESSMENT
73 y/o female with PMHx of Anxiety, Parkinson's Disease presenting to the ED c/o generalized weakness, sent in by her PCP dr Obrien for evaluation of ESBL in urine. Pt was seen by PCP a few days ago for weakness and was told she had a UTI with ESBL. Pt typically able to walk with assistance but currently unable to ambulate. Pt has not had a BM in a few days. Denies fever, NVD, or abdominal pain or chest pain Patient was started on meropenem and IVF in ed.     1. ecoli cystitis. alzheimers dementia. spenser  - Cr better, improving  - urine cx here with >100,000 Ecoli S cephalosporins NOT ESBL  - s/p meropenem 3 days switch to rocephin 1gm daily  - dc with po ceftin complete 7 day course  - monitor temps  - tolerating abx well so far; no side effects noted  - reason for abx use and side effects reviewed with patient   - supportive care  - f/u cbc    2. other issues - care per medicine

## 2019-04-22 NOTE — PROGRESS NOTE ADULT - SUBJECTIVE AND OBJECTIVE BOX
Date of service: 04-22-19 @ 13:16    pt seen and examined  feels better  no fevers  no dysuria     ROS: no fever or chills; denies dizziness, no HA, no SOB or cough, no abdominal pain, no diarrhea or constipation; no legs pain, no rashes    MEDICATIONS  (STANDING):  buPROPion XL . 300 milliGRAM(s) Oral daily  carbidopa/levodopa  25/100 1 Tablet(s) Oral <User Schedule>  carbidopa/levodopa CR 50/200 1 Tablet(s) Oral <User Schedule>  cefTRIAXone Injectable. 1000 milliGRAM(s) IV Push every 24 hours  donepezil 10 milliGRAM(s) Oral at bedtime  enoxaparin Injectable 40 milliGRAM(s) SubCutaneous every 24 hours  folic acid 1 milliGRAM(s) Oral daily  LORazepam     Tablet 0.5 milliGRAM(s) Oral two times a day  memantine 5 milliGRAM(s) Oral two times a day  midodrine 5 milliGRAM(s) Oral <User Schedule>  sertraline 200 milliGRAM(s) Oral daily      Vital Signs Last 24 Hrs  T(C): 36.6 (22 Apr 2019 10:43), Max: 37.1 (21 Apr 2019 20:48)  T(F): 97.8 (22 Apr 2019 10:43), Max: 98.8 (21 Apr 2019 20:48)  HR: 87 (22 Apr 2019 10:43) (78 - 95)  BP: 120/84 (22 Apr 2019 10:43) (120/84 - 164/74)  BP(mean): --  RR: 16 (22 Apr 2019 10:43) (16 - 16)  SpO2: 95% (22 Apr 2019 10:43) (95% - 99%)        PE:  Constitutional: frail looking  HEENT: NC/AT, EOMI, PERRLA, conjunctivae clear; ears and nose atraumatic; pharynx benign  Neck: supple; thyroid not palpable  Back: no tenderness  Respiratory: respiratory effort normal; clear to auscultation  Cardiovascular: S1S2 regular, no murmurs  Abdomen: soft, not tender, not distended, positive BS; liver and spleen WNL  Genitourinary: no suprapubic tenderness  Lymphatic: no LN palpable  Musculoskeletal: no muscle tenderness, no joint swelling or tenderness  Extremities: no pedal edema  Neurological/ Psychiatric:  moving all extremities  Skin: no rashes; no palpable lesions    Labs: all available labs reviewed      Culture - Urine (04.19.19 @ 14:06)    -  Amikacin: S <=16    -  Ampicillin: R >16 These ampicillin results predict results for amoxicillin    -  Ampicillin/Sulbactam: I 16/8    -  Aztreonam: S <=4    -  Cefazolin: S <=8 For uncomplicated UTI with K. pneumoniae, E. coli, or P. mirablis: JOYCE <=16 is sensitive and JOYCE >=32 is resistant. This also predicts results for oral agents cefaclor, cefdinir, cefpodoxime, cefprozil, cefuroxime axetil, cephalexin and locarbef for uncomplicated UTI. Note that some isolates may be susceptible to these agents while testing resistant to cefazolin.    -  Cefepime: S <=4    -  Cefoxitin: S <=8    -  Ceftriaxone: S <=1 Enterobacter, Citrobacter, and Serratia may develop resistance during prolonged therapy    -  Ciprofloxacin: R >2    -  Ertapenem: S <=1    -  Gentamicin: S <=4    -  Imipenem: S <=1    -  Levofloxacin: R >4    -  Meropenem: S <=1    -  Nitrofurantoin: S <=32 Should not be used to treat pyelonephritis    -  Piperacillin/Tazobactam: S <=16    -  Tigecycline: S <=2    -  Tobramycin: S <=4    -  Trimethoprim/Sulfamethoxazole: S <=2/38    Specimen Source: .Urine None    Culture Results:   >100,000 CFU/ml Escherichia coli    Organism Identification: Escherichia coli    Organism: Escherichia coli    Method Type: JOYCE        Radiology: all available radiological tests reviewed    Advanced directives addressed: full resuscitation
73 y/o female with PMHx of Anxiety, Parkinson's Disease presenting to the ED c/o generalized weakness, sent in by her PCP dr Obrien for evaluation of ESBL in urine. Pt was seen by PCP a few days ago for weakness and was told she had a UTI with ESBL. Pt typically able to walk with assistance but currently unable to ambulate. Pt has not had a BM in a few days. Denies fever, NVD, or  abdominal pain or chest pain  patient was started on meropenem and IVF in ed      -she remians afebrile, denies abdominal pain, no nausea ,no vomiting, feels slightly better than yesterday with regards to her weakness    Constitutional: Well appearing, NAD  	HEENT: Atraumatic, CAR, Normal, No congestion  	Respiratory: Breath Sounds normal, no rhonchi/wheeze  	Cardiovascular: N S1S2;   	Gastrointestinal: Abdomen soft, non tender, Bowel Ssounds present  	Extremities: No edema,  	Neurological: awake, alert follows commands, moves all extremities , no gross facial symmetry    	Back: No CVA tenderness   	Musculoskeletal: non tender  	Breasts: Deferred  	Genitourinary: deferred      PHYSICAL EXAM:    Daily Height in cm: 167.64 (2019 12:55)    Daily     ICU Vital Signs Last 24 Hrs  T(C): 37 (2019 05:38), Max: 37.2 (2019 20:30)  T(F): 98.6 (2019 05:38), Max: 99 (2019 20:30)  HR: 74 (2019 05:38) (74 - 91)  BP: 168/72 (2019 05:38) (140/92 - 168/72)  BP(mean): --  ABP: --  ABP(mean): --  RR: 18 (2019 20:30) (18 - 18)  SpO2: 99% (2019 05:38) (95% - 100%)                              12.0   6.42  )-----------( 234      ( 2019 07:01 )             37.5       CBC Full  -  ( 2019 07:01 )  WBC Count : 6.42 K/uL  RBC Count : 4.00 M/uL  Hemoglobin : 12.0 g/dL  Hematocrit : 37.5 %  Platelet Count - Automated : 234 K/uL  Mean Cell Volume : 93.8 fl  Mean Cell Hemoglobin : 30.0 pg  Mean Cell Hemoglobin Concentration : 32.0 gm/dL  Auto Neutrophil # : 4.14 K/uL  Auto Lymphocyte # : 1.44 K/uL  Auto Monocyte # : 0.69 K/uL  Auto Eosinophil # : 0.06 K/uL  Auto Basophil # : 0.06 K/uL  Auto Neutrophil % : 64.6 %  Auto Lymphocyte % : 22.4 %  Auto Monocyte % : 10.7 %  Auto Eosinophil % : 0.9 %  Auto Basophil % : 0.9 %          140  |  107  |  17  ----------------------------<  91  4.2   |  24  |  1.05    Ca    8.4<L>      2019 07:01    TPro  6.5  /  Alb  3.5  /  TBili  0.4  /  DBili  x   /  AST  17  /  ALT  8<L>  /  AlkPhos  70  04-20      LIVER FUNCTIONS - ( 2019 07:01 )  Alb: 3.5 g/dL / Pro: 6.5 gm/dL / ALK PHOS: 70 U/L / ALT: 8 U/L / AST: 17 U/L / GGT: x                       Urinalysis Basic - ( 2019 14:06 )    Color: Yellow / Appearance: Clear / S.025 / pH: x  Gluc: x / Ketone: Trace  / Bili: Negative / Urobili: Negative mg/dL   Blood: x / Protein: 15 mg/dL / Nitrite: Positive   Leuk Esterase: Small / RBC: Negative /HPF / WBC 0-2   Sq Epi: x / Non Sq Epi: x / Bacteria: Many            MEDICATIONS  (STANDING):  buPROPion XL . 300 milliGRAM(s) Oral daily  carbidopa/levodopa  25/100 1 Tablet(s) Oral <User Schedule>  carbidopa/levodopa CR 50/200 1 Tablet(s) Oral <User Schedule>  donepezil 10 milliGRAM(s) Oral at bedtime  enoxaparin Injectable 40 milliGRAM(s) SubCutaneous every 24 hours  folic acid 1 milliGRAM(s) Oral daily  LORazepam     Tablet 0.5 milliGRAM(s) Oral two times a day  memantine 5 milliGRAM(s) Oral two times a day  meropenem  IVPB 1000 milliGRAM(s) IV Intermittent every 12 hours  midodrine 5 milliGRAM(s) Oral <User Schedule>  sertraline 100 milliGRAM(s) Oral daily
75 y/o female with PMHx of Anxiety, Parkinson's Disease presenting to the ED c/o generalized weakness, sent in by her PCP dr Obrien for evaluation of ESBL in urine. Pt was seen by PCP a few days ago for weakness and was told she had a UTI with ESBL. Pt typically able to walk with assistance but currently unable to ambulate. Pt has not had a BM in a few days. Denies fever, NVD, or  abdominal pain or chest pain  patient was started on meropenem and IVF in ed      4/21-she remians afebrile, denies abdominal pain, no nausea ,no vomiting, reports she feel good today    Constitutional: Well appearing, NAD, sitting up and  watching TV, says she is enjoying the baseball game  	HEENT: Atraumatic, CAR, Normal, No congestion  	Respiratory: Breath Sounds normal, no rhonchi/wheeze  	Cardiovascular: N S1S2;   	Gastrointestinal: Abdomen soft, non tender, Bowel Ssounds present  	Extremities: No edema,  	Neurological: awake, alert follows commands, moves all extremities , no gross facial symmetry    	Back: No CVA tenderness   	Musculoskeletal: non tender  	Breasts: Deferred  	Genitourinary: deferred      PHYSICAL EXAM:    Daily     Daily     ICU Vital Signs Last 24 Hrs  T(C): 36.9 (21 Apr 2019 16:18), Max: 37.1 (20 Apr 2019 17:09)  T(F): 98.4 (21 Apr 2019 16:18), Max: 98.8 (21 Apr 2019 10:33)  HR: 95 (21 Apr 2019 16:18) (67 - 95)  BP: 126/77 (21 Apr 2019 16:18) (126/77 - 160/83)  BP(mean): --  ABP: --  ABP(mean): --  RR: 16 (21 Apr 2019 16:18) (16 - 18)  SpO2: 97% (21 Apr 2019 16:18) (96% - 97%)                                12.0   6.42  )-----------( 234      ( 20 Apr 2019 07:01 )             37.5       CBC Full  -  ( 20 Apr 2019 07:01 )  WBC Count : 6.42 K/uL  RBC Count : 4.00 M/uL  Hemoglobin : 12.0 g/dL  Hematocrit : 37.5 %  Platelet Count - Automated : 234 K/uL  Mean Cell Volume : 93.8 fl  Mean Cell Hemoglobin : 30.0 pg  Mean Cell Hemoglobin Concentration : 32.0 gm/dL  Auto Neutrophil # : 4.14 K/uL  Auto Lymphocyte # : 1.44 K/uL  Auto Monocyte # : 0.69 K/uL  Auto Eosinophil # : 0.06 K/uL  Auto Basophil # : 0.06 K/uL  Auto Neutrophil % : 64.6 %  Auto Lymphocyte % : 22.4 %  Auto Monocyte % : 10.7 %  Auto Eosinophil % : 0.9 %  Auto Basophil % : 0.9 %      04-20    140  |  107  |  17  ----------------------------<  91  4.2   |  24  |  1.05    Ca    8.4<L>      20 Apr 2019 07:01    TPro  6.5  /  Alb  3.5  /  TBili  0.4  /  DBili  x   /  AST  17  /  ALT  8<L>  /  AlkPhos  70  04-20      LIVER FUNCTIONS - ( 20 Apr 2019 07:01 )  Alb: 3.5 g/dL / Pro: 6.5 gm/dL / ALK PHOS: 70 U/L / ALT: 8 U/L / AST: 17 U/L / GGT: x                               MEDICATIONS  (STANDING):  buPROPion XL . 300 milliGRAM(s) Oral daily  carbidopa/levodopa  25/100 1 Tablet(s) Oral <User Schedule>  carbidopa/levodopa CR 50/200 1 Tablet(s) Oral <User Schedule>  donepezil 10 milliGRAM(s) Oral at bedtime  enoxaparin Injectable 40 milliGRAM(s) SubCutaneous every 24 hours  folic acid 1 milliGRAM(s) Oral daily  LORazepam     Tablet 0.5 milliGRAM(s) Oral two times a day  memantine 5 milliGRAM(s) Oral two times a day  meropenem  IVPB 1000 milliGRAM(s) IV Intermittent every 12 hours  midodrine 5 milliGRAM(s) Oral <User Schedule>  sertraline 200 milliGRAM(s) Oral daily

## 2019-04-22 NOTE — PROGRESS NOTE ADULT - REASON FOR ADMISSION
sent in by PCP for ESBL UTI tested as outpatient and has generalised weakness

## 2019-04-26 DIAGNOSIS — N39.0 URINARY TRACT INFECTION, SITE NOT SPECIFIED: ICD-10-CM

## 2019-04-26 DIAGNOSIS — E86.0 DEHYDRATION: ICD-10-CM

## 2019-04-26 DIAGNOSIS — Z79.899 OTHER LONG TERM (CURRENT) DRUG THERAPY: ICD-10-CM

## 2019-04-26 DIAGNOSIS — F02.80 DEMENTIA IN OTHER DISEASES CLASSIFIED ELSEWHERE, UNSPECIFIED SEVERITY, WITHOUT BEHAVIORAL DISTURBANCE, PSYCHOTIC DISTURBANCE, MOOD DISTURBANCE, AND ANXIETY: ICD-10-CM

## 2019-04-26 DIAGNOSIS — G20 PARKINSON'S DISEASE: ICD-10-CM

## 2019-04-26 DIAGNOSIS — Z16.12 EXTENDED SPECTRUM BETA LACTAMASE (ESBL) RESISTANCE: ICD-10-CM

## 2019-04-26 DIAGNOSIS — B96.20 UNSPECIFIED ESCHERICHIA COLI [E. COLI] AS THE CAUSE OF DISEASES CLASSIFIED ELSEWHERE: ICD-10-CM

## 2019-04-26 DIAGNOSIS — N17.9 ACUTE KIDNEY FAILURE, UNSPECIFIED: ICD-10-CM

## 2019-04-26 DIAGNOSIS — F41.9 ANXIETY DISORDER, UNSPECIFIED: ICD-10-CM

## 2019-05-29 ENCOUNTER — EMERGENCY (EMERGENCY)
Facility: HOSPITAL | Age: 74
LOS: 0 days | Discharge: HOME | End: 2019-05-29
Attending: EMERGENCY MEDICINE | Admitting: EMERGENCY MEDICINE
Payer: MEDICARE

## 2019-05-29 VITALS
SYSTOLIC BLOOD PRESSURE: 119 MMHG | OXYGEN SATURATION: 95 % | TEMPERATURE: 98 F | HEART RATE: 84 BPM | DIASTOLIC BLOOD PRESSURE: 75 MMHG | RESPIRATION RATE: 17 BRPM

## 2019-05-29 VITALS — HEIGHT: 62 IN | WEIGHT: 154.98 LBS

## 2019-05-29 DIAGNOSIS — I67.89 OTHER CEREBROVASCULAR DISEASE: ICD-10-CM

## 2019-05-29 DIAGNOSIS — S62.91XA UNSPECIFIED FRACTURE OF RIGHT WRIST AND HAND, INITIAL ENCOUNTER FOR CLOSED FRACTURE: ICD-10-CM
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
## 2019-05-29 DIAGNOSIS — M25.531 PAIN IN RIGHT WRIST: ICD-10-CM

## 2019-05-29 DIAGNOSIS — F41.9 ANXIETY DISORDER, UNSPECIFIED: ICD-10-CM

## 2019-05-29 DIAGNOSIS — G20 PARKINSON'S DISEASE: ICD-10-CM
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
## 2019-05-29 PROCEDURE — 99283 EMERGENCY DEPT VISIT LOW MDM: CPT | Mod: 25

## 2019-05-29 PROCEDURE — 29125 APPL SHORT ARM SPLINT STATIC: CPT

## 2019-05-29 PROCEDURE — 73110 X-RAY EXAM OF WRIST: CPT | Mod: 26,RT

## 2019-05-29 PROCEDURE — 70450 CT HEAD/BRAIN W/O DYE: CPT | Mod: 26

## 2019-05-29 PROCEDURE — 73090 X-RAY EXAM OF FOREARM: CPT | Mod: 26,RT

## 2019-05-29 NOTE — ED ADULT TRIAGE NOTE - CHIEF COMPLAINT QUOTE
c/o fall last night. as per , patient has hx of Parkinson's, got up to go to the bathroom, lost balance. Denies hitting head or LOC. Reports pain to right wrist/arm area.

## 2019-05-29 NOTE — ED PROVIDER NOTE - CONSTITUTIONAL, MLM
normal... Well appearing, well nourished, awake, alert, oriented and in no apparent distress. head atraumatic

## 2019-05-29 NOTE — ED ADULT NURSE NOTE - NSIMPLEMENTINTERV_GEN_ALL_ED
Implemented All Fall Risk Interventions:  [place] to call system. Call bell, personal items and telephone within reach. Instruct patient to call for assistance. Room bathroom lighting operational. Non-slip footwear when patient is off stretcher. Physically safe environment: no spills, clutter or unnecessary equipment. Stretcher in lowest position, wheels locked, appropriate side rails in place. Provide visual cue, wrist band, yellow gown, etc. Monitor gait and stability. Monitor for mental status changes and reorient to person, place, and time. Review medications for side effects contributing to fall risk. Reinforce activity limits and safety measures with patient and family.

## 2019-05-29 NOTE — ED ADULT NURSE NOTE - OBJECTIVE STATEMENT
patient s/p trip and fall last night. patient has Parkinson's. denies hitting head. complains of right wrist pain. + swelling & ecchymosis noted. patient able to wiggle fingers, + sensation and + b/l radial pulses. no other injuries noted. color good. skin warm and dry. respirations even and unlabored. pain medication offered - patient states she took tylenol at 7am and would not like anything else for pain at this time. call bell within reach.

## 2019-05-29 NOTE — ED PROVIDER NOTE - OBJECTIVE STATEMENT
75 yo f with parkinsons disease presents s/p fall yesterday in the bathroom.  she states she tripped, no loc, no head injury that she knows of.  c/o right hand/wrist pain, worse with movement.  patient took tylenol this morning

## 2019-05-29 NOTE — ED PROVIDER NOTE - CARE PROVIDER_API CALL
Jose Juan Pena)  Orthopaedic Surgery; Surgery of the Hand  [address]  [place] [address]  Phone: [phone]  Fax: [phone]  Follow Up Time:

## 2019-05-29 NOTE — ED PROVIDER NOTE - MUSCULOSKELETAL, MLM
Spine appears normal, range of motion is not limited, no muscle or joint tenderness. right distal forearm echymotic, full range of motion with obvious distal forearm deformity, neurovascularly intact, cap refill intact

## 2019-05-31 ENCOUNTER — RX RENEWAL (OUTPATIENT)
Age: 74
End: 2019-05-31

## 2019-06-03 ENCOUNTER — APPOINTMENT (OUTPATIENT)
Dept: PAIN MANAGEMENT | Facility: CLINIC | Age: 74
End: 2019-06-03
Payer: MEDICARE

## 2019-06-03 VITALS
DIASTOLIC BLOOD PRESSURE: 77 MMHG | SYSTOLIC BLOOD PRESSURE: 119 MMHG | HEART RATE: 84 BPM | WEIGHT: 160 LBS | BODY MASS INDEX: 29.44 KG/M2 | HEIGHT: 62 IN

## 2019-06-03 PROCEDURE — 99215 OFFICE O/P EST HI 40 MIN: CPT

## 2019-06-06 ENCOUNTER — INPATIENT (INPATIENT)
Facility: HOSPITAL | Age: 74
LOS: 3 days | Discharge: NURSING, REHAB OR LONG-TERM CARE | End: 2019-06-10
Attending: INTERNAL MEDICINE | Admitting: INTERNAL MEDICINE
Payer: MEDICARE

## 2019-06-06 VITALS — HEIGHT: 63 IN | WEIGHT: 139.99 LBS

## 2019-06-06 LAB
ALBUMIN SERPL ELPH-MCNC: 3.8 G/DL — SIGNIFICANT CHANGE UP (ref 3.3–5)
ALP SERPL-CCNC: 101 U/L — SIGNIFICANT CHANGE UP (ref 40–120)
ALT FLD-CCNC: 11 U/L — LOW (ref 12–78)
ANION GAP SERPL CALC-SCNC: 9 MMOL/L — SIGNIFICANT CHANGE UP (ref 5–17)
APPEARANCE UR: CLEAR — SIGNIFICANT CHANGE UP
AST SERPL-CCNC: 13 U/L — LOW (ref 15–37)
BACTERIA # UR AUTO: ABNORMAL
BASOPHILS # BLD AUTO: 0.07 K/UL — SIGNIFICANT CHANGE UP (ref 0–0.2)
BASOPHILS NFR BLD AUTO: 0.7 % — SIGNIFICANT CHANGE UP (ref 0–2)
BILIRUB SERPL-MCNC: 0.4 MG/DL — SIGNIFICANT CHANGE UP (ref 0.2–1.2)
BILIRUB UR-MCNC: NEGATIVE — SIGNIFICANT CHANGE UP
BUN SERPL-MCNC: 17 MG/DL — SIGNIFICANT CHANGE UP (ref 7–23)
CALCIUM SERPL-MCNC: 9.5 MG/DL — SIGNIFICANT CHANGE UP (ref 8.5–10.1)
CHLORIDE SERPL-SCNC: 104 MMOL/L — SIGNIFICANT CHANGE UP (ref 96–108)
CO2 SERPL-SCNC: 28 MMOL/L — SIGNIFICANT CHANGE UP (ref 22–31)
COLOR SPEC: YELLOW — SIGNIFICANT CHANGE UP
CREAT SERPL-MCNC: 1.15 MG/DL — SIGNIFICANT CHANGE UP (ref 0.5–1.3)
DIFF PNL FLD: NEGATIVE — SIGNIFICANT CHANGE UP
EOSINOPHIL # BLD AUTO: 0.04 K/UL — SIGNIFICANT CHANGE UP (ref 0–0.5)
EOSINOPHIL NFR BLD AUTO: 0.4 % — SIGNIFICANT CHANGE UP (ref 0–6)
GLUCOSE SERPL-MCNC: 112 MG/DL — HIGH (ref 70–99)
GLUCOSE UR QL: NEGATIVE MG/DL — SIGNIFICANT CHANGE UP
HCT VFR BLD CALC: 41.7 % — SIGNIFICANT CHANGE UP (ref 34.5–45)
HGB BLD-MCNC: 13.3 G/DL — SIGNIFICANT CHANGE UP (ref 11.5–15.5)
IMM GRANULOCYTES NFR BLD AUTO: 0.8 % — SIGNIFICANT CHANGE UP (ref 0–1.5)
KETONES UR-MCNC: NEGATIVE — SIGNIFICANT CHANGE UP
LEUKOCYTE ESTERASE UR-ACNC: ABNORMAL
LYMPHOCYTES # BLD AUTO: 0.77 K/UL — LOW (ref 1–3.3)
LYMPHOCYTES # BLD AUTO: 8.2 % — LOW (ref 13–44)
MAGNESIUM SERPL-MCNC: 2.6 MG/DL — SIGNIFICANT CHANGE UP (ref 1.6–2.6)
MCHC RBC-ENTMCNC: 30.1 PG — SIGNIFICANT CHANGE UP (ref 27–34)
MCHC RBC-ENTMCNC: 31.9 GM/DL — LOW (ref 32–36)
MCV RBC AUTO: 94.3 FL — SIGNIFICANT CHANGE UP (ref 80–100)
MONOCYTES # BLD AUTO: 0.56 K/UL — SIGNIFICANT CHANGE UP (ref 0–0.9)
MONOCYTES NFR BLD AUTO: 5.9 % — SIGNIFICANT CHANGE UP (ref 2–14)
NEUTROPHILS # BLD AUTO: 7.91 K/UL — HIGH (ref 1.8–7.4)
NEUTROPHILS NFR BLD AUTO: 84 % — HIGH (ref 43–77)
NITRITE UR-MCNC: NEGATIVE — SIGNIFICANT CHANGE UP
PH UR: 6 — SIGNIFICANT CHANGE UP (ref 5–8)
PLATELET # BLD AUTO: 268 K/UL — SIGNIFICANT CHANGE UP (ref 150–400)
POTASSIUM SERPL-MCNC: 4.4 MMOL/L — SIGNIFICANT CHANGE UP (ref 3.5–5.3)
POTASSIUM SERPL-SCNC: 4.4 MMOL/L — SIGNIFICANT CHANGE UP (ref 3.5–5.3)
PROT SERPL-MCNC: 7.1 GM/DL — SIGNIFICANT CHANGE UP (ref 6–8.3)
PROT UR-MCNC: 15 MG/DL
RBC # BLD: 4.42 M/UL — SIGNIFICANT CHANGE UP (ref 3.8–5.2)
RBC # FLD: 13.2 % — SIGNIFICANT CHANGE UP (ref 10.3–14.5)
RBC CASTS # UR COMP ASSIST: NEGATIVE /HPF — SIGNIFICANT CHANGE UP (ref 0–4)
SODIUM SERPL-SCNC: 141 MMOL/L — SIGNIFICANT CHANGE UP (ref 135–145)
SP GR SPEC: 1.01 — SIGNIFICANT CHANGE UP (ref 1.01–1.02)
TROPONIN I SERPL-MCNC: <0.015 NG/ML — SIGNIFICANT CHANGE UP (ref 0.01–0.04)
UROBILINOGEN FLD QL: NEGATIVE MG/DL — SIGNIFICANT CHANGE UP
WBC # BLD: 9.43 K/UL — SIGNIFICANT CHANGE UP (ref 3.8–10.5)
WBC # FLD AUTO: 9.43 K/UL — SIGNIFICANT CHANGE UP (ref 3.8–10.5)
WBC UR QL: SIGNIFICANT CHANGE UP

## 2019-06-06 PROCEDURE — 70450 CT HEAD/BRAIN W/O DYE: CPT | Mod: 26

## 2019-06-06 PROCEDURE — 71045 X-RAY EXAM CHEST 1 VIEW: CPT | Mod: 26

## 2019-06-06 PROCEDURE — 99285 EMERGENCY DEPT VISIT HI MDM: CPT

## 2019-06-06 PROCEDURE — 93010 ELECTROCARDIOGRAM REPORT: CPT

## 2019-06-06 RX ORDER — CARBIDOPA AND LEVODOPA 25; 100 MG/1; MG/1
1 TABLET ORAL
Refills: 0 | Status: DISCONTINUED | OUTPATIENT
Start: 2019-06-06 | End: 2019-06-10

## 2019-06-06 RX ORDER — DOCUSATE SODIUM 100 MG
100 CAPSULE ORAL
Refills: 0 | Status: DISCONTINUED | OUTPATIENT
Start: 2019-06-06 | End: 2019-06-10

## 2019-06-06 RX ORDER — MIDODRINE HYDROCHLORIDE 2.5 MG/1
5 TABLET ORAL
Refills: 0 | Status: DISCONTINUED | OUTPATIENT
Start: 2019-06-06 | End: 2019-06-10

## 2019-06-06 RX ORDER — CARBIDOPA 25 MG
25 TABLET ORAL
Refills: 0 | Status: DISCONTINUED | OUTPATIENT
Start: 2019-06-06 | End: 2019-06-10

## 2019-06-06 RX ORDER — DONEPEZIL HYDROCHLORIDE 10 MG/1
10 TABLET, FILM COATED ORAL AT BEDTIME
Refills: 0 | Status: DISCONTINUED | OUTPATIENT
Start: 2019-06-06 | End: 2019-06-10

## 2019-06-06 RX ORDER — SERTRALINE 25 MG/1
200 TABLET, FILM COATED ORAL DAILY
Refills: 0 | Status: DISCONTINUED | OUTPATIENT
Start: 2019-06-07 | End: 2019-06-10

## 2019-06-06 RX ORDER — MIDODRINE HYDROCHLORIDE 2.5 MG/1
10 TABLET ORAL
Refills: 0 | Status: DISCONTINUED | OUTPATIENT
Start: 2019-06-06 | End: 2019-06-10

## 2019-06-06 RX ORDER — CARBIDOPA AND LEVODOPA 25; 100 MG/1; MG/1
2 TABLET ORAL
Refills: 0 | Status: DISCONTINUED | OUTPATIENT
Start: 2019-06-06 | End: 2019-06-10

## 2019-06-06 RX ORDER — FOLIC ACID 0.8 MG
1 TABLET ORAL DAILY
Refills: 0 | Status: DISCONTINUED | OUTPATIENT
Start: 2019-06-06 | End: 2019-06-10

## 2019-06-06 RX ORDER — MAGNESIUM HYDROXIDE 400 MG/1
30 TABLET, CHEWABLE ORAL DAILY
Refills: 0 | Status: DISCONTINUED | OUTPATIENT
Start: 2019-06-06 | End: 2019-06-10

## 2019-06-06 RX ORDER — BUPROPION HYDROCHLORIDE 150 MG/1
300 TABLET, EXTENDED RELEASE ORAL DAILY
Refills: 0 | Status: DISCONTINUED | OUTPATIENT
Start: 2019-06-06 | End: 2019-06-10

## 2019-06-06 RX ORDER — MEMANTINE HYDROCHLORIDE 10 MG/1
5 TABLET ORAL
Refills: 0 | Status: DISCONTINUED | OUTPATIENT
Start: 2019-06-06 | End: 2019-06-10

## 2019-06-06 RX ORDER — CEFUROXIME AXETIL 250 MG
1 TABLET ORAL
Qty: 0 | Refills: 0 | DISCHARGE

## 2019-06-06 RX ORDER — CARBIDOPA AND LEVODOPA 25; 100 MG/1; MG/1
2 TABLET ORAL
Qty: 0 | Refills: 0 | DISCHARGE

## 2019-06-06 RX ORDER — ONDANSETRON 8 MG/1
4 TABLET, FILM COATED ORAL EVERY 4 HOURS
Refills: 0 | Status: DISCONTINUED | OUTPATIENT
Start: 2019-06-06 | End: 2019-06-10

## 2019-06-06 RX ORDER — SODIUM CHLORIDE 9 MG/ML
1000 INJECTION INTRAMUSCULAR; INTRAVENOUS; SUBCUTANEOUS ONCE
Refills: 0 | Status: COMPLETED | OUTPATIENT
Start: 2019-06-06 | End: 2019-06-06

## 2019-06-06 RX ORDER — SENNA PLUS 8.6 MG/1
2 TABLET ORAL AT BEDTIME
Refills: 0 | Status: DISCONTINUED | OUTPATIENT
Start: 2019-06-06 | End: 2019-06-10

## 2019-06-06 RX ORDER — ACETAMINOPHEN 500 MG
650 TABLET ORAL EVERY 6 HOURS
Refills: 0 | Status: DISCONTINUED | OUTPATIENT
Start: 2019-06-06 | End: 2019-06-10

## 2019-06-06 RX ORDER — DONEPEZIL HYDROCHLORIDE 10 MG/1
1 TABLET, FILM COATED ORAL
Qty: 0 | Refills: 0 | DISCHARGE

## 2019-06-06 RX ORDER — CARBIDOPA 25 MG
1 TABLET ORAL
Qty: 0 | Refills: 0 | DISCHARGE

## 2019-06-06 RX ORDER — ENOXAPARIN SODIUM 100 MG/ML
40 INJECTION SUBCUTANEOUS EVERY 24 HOURS
Refills: 0 | Status: DISCONTINUED | OUTPATIENT
Start: 2019-06-06 | End: 2019-06-10

## 2019-06-06 RX ORDER — CARBIDOPA AND LEVODOPA 25; 100 MG/1; MG/1
1 TABLET ORAL
Qty: 0 | Refills: 0 | DISCHARGE

## 2019-06-06 RX ORDER — CEPHALEXIN 500 MG
500 CAPSULE ORAL ONCE
Refills: 0 | Status: COMPLETED | OUTPATIENT
Start: 2019-06-06 | End: 2019-06-06

## 2019-06-06 RX ADMIN — MEMANTINE HYDROCHLORIDE 5 MILLIGRAM(S): 10 TABLET ORAL at 20:25

## 2019-06-06 RX ADMIN — SODIUM CHLORIDE 1000 MILLILITER(S): 9 INJECTION INTRAMUSCULAR; INTRAVENOUS; SUBCUTANEOUS at 11:35

## 2019-06-06 RX ADMIN — DONEPEZIL HYDROCHLORIDE 10 MILLIGRAM(S): 10 TABLET, FILM COATED ORAL at 20:25

## 2019-06-06 RX ADMIN — ENOXAPARIN SODIUM 40 MILLIGRAM(S): 100 INJECTION SUBCUTANEOUS at 17:40

## 2019-06-06 RX ADMIN — CARBIDOPA AND LEVODOPA 1 TABLET(S): 25; 100 TABLET ORAL at 20:24

## 2019-06-06 RX ADMIN — CARBIDOPA AND LEVODOPA 2 TABLET(S): 25; 100 TABLET ORAL at 15:55

## 2019-06-06 RX ADMIN — SODIUM CHLORIDE 1000 MILLILITER(S): 9 INJECTION INTRAMUSCULAR; INTRAVENOUS; SUBCUTANEOUS at 12:35

## 2019-06-06 RX ADMIN — Medication 0.5 MILLIGRAM(S): at 20:25

## 2019-06-06 RX ADMIN — Medication 500 MILLIGRAM(S): at 13:57

## 2019-06-06 RX ADMIN — Medication 0.5 MILLIGRAM(S): at 15:54

## 2019-06-06 RX ADMIN — CARBIDOPA AND LEVODOPA 2 TABLET(S): 25; 100 TABLET ORAL at 20:24

## 2019-06-06 NOTE — ED ADULT NURSE NOTE - OBJECTIVE STATEMENT
Pt brought in by  for worsening tremor, confusion, multiple recent falls.  Pt on midodrine for hypotension, Sinemet for Parkinson's.  Pt alert, mild confusion, tremulous, cooperative.  Pt taken to CT prior to lab work.  EKG done on arrival. Cardiac and VS monitoring initiated.  20g PIV placed in LAC.  Straight cath for urine specimen.  Bruising to right upper arm and shoulder from fall last week, splint to right forearm; pt denies pain.

## 2019-06-06 NOTE — ED PROVIDER NOTE - SKIN, MLM
Skin normal color for race, warm, dry and intact. No evidence of rash. +week old bruising distal to the right shoulder

## 2019-06-06 NOTE — ED PROVIDER NOTE - CHPI ED SYMPTOMS POS
+generalized shaking +AMS/DIZZINESS/NAUSEA DIZZINESS/NAUSEA/+generalized shaking +AMS +generalized weakness

## 2019-06-06 NOTE — CONSULT NOTE ADULT - ASSESSMENT
Ms. Dodge is 74 year old woman with Parkinson's disease with recent episodes of shaking of upper extremities and staring/unresponsiveness upon standing.  This did not improve with increased dose of Midodrine.  Likely orthostatic/autonomic dysfunction in setting of Parkinson's.  -Continue carbidopa/levodopa 25/100 2 tabs five times per day (home dosages are given at 7, 10, 1, 4, 7)   -Carbidopa 25 mg 1 tablet five times per day  -Carbidopa/levodopa CR 50/200 qhs    -Check orthostatics  -Continue Midodrine 10 mg in the Am and 5 mg in the PM.    -Seizure is less likely given pattern of events with standing but will check routine EEG.    -F/U urine culture.     I discussed the case with her neurologist, Dr. Padilla. If Midodrine does not help, Northera can be tried but this is not available in the hospital and there may be insurance issues. He also believes that anxiety is a major factor.   -I have also left a message for Dr. Coello at [facility] to see if she is a candidate for parkinson's rehab at [facility].    Will continue to follow.

## 2019-06-06 NOTE — H&P ADULT - HISTORY OF PRESENT ILLNESS
This is a pleasant 73 y/o female with PMHx advanced Parkinson's disease, orthostatic hypotension and anxiety brought in by her  for ongoing body shaking at home and worsening unsteady gait. Patient fell 8 days ago and sustained a right wrist fracture that was splinted in the ED. Yesterday, per outpatient Neuro recs Midodrine was increased to 10mg in AM along with continued 5mg in evening for severe orthostasis.  notes patient experiencing body shaking with position changes, is unaware of this and becomes extremely weak afterwards. No prior history of seizures. No head trauma, tongue biting or bowel incontinence. Patient is intermittently incontinent of urine and was treated for ESBL E. coli UTI in April. SInce then has not had recurrent UTI's.     In the ED, notable labs include normal CBC, CMP and UA with concerns for pyuria. CT head with brain atrophy, CXR clear. Admission requested.   Seen with , denies CP / SOB/ HA/ abd pain. All other ROS reviewed and neg unless stated above in HPI. NO recent fevers/ chills/ dysuria.    PAST MEDICAL & SURGICAL HISTORY:  Parkinson disease  Anxiety  Detached Retina, Left: and macular hole 5/2011  S/P Cataract Surgery: 2011  Macular Hole: 2010  Retinal Tear: 2003      Social Hx:  Nonsmoker, no drug or alcohol use, lives with . Has home health aide 8hrs /day 5 days a week.    Family hx- no hx of heart disease or cancer in 1st degree relatives      Vital Signs Last 24 Hrs  T(C): 36.9 (06 Jun 2019 15:16), Max: 37.1 (06 Jun 2019 10:39)  T(F): 98.5 (06 Jun 2019 15:16), Max: 98.7 (06 Jun 2019 10:39)  HR: 78 (06 Jun 2019 14:36) (70 - 82)  BP: 153/81 (06 Jun 2019 14:36) (90/69 - 153/81)  BP(mean): --  RR: 18 (06 Jun 2019 14:36) (17 - 18)  SpO2: 95% (06 Jun 2019 14:36) (95% - 96%)    PHYSICAL EXAM:  Constitutional: NAD, awake and alert, well-developed chronically weak appearing elderly female.   HEENT: PERR, EOMI, Normal Hearing, MMM  Neck: Soft and supple, No LAD, No JVD  Respiratory: Breath sounds are clear bilaterally, No wheezing, rales or rhonchi  Cardiovascular: S1 and S2, regular rate and rhythm, no Murmurs, gallops or rubs  Gastrointestinal: Bowel Sounds present, soft, nontender, nondistended, no guarding, no rebound  Extremities: No peripheral edema  Vascular: 2+ peripheral pulses  Neurological: A/O x 3, no focal deficits, RUE wrist in splint, notable resting tremor.   Musculoskeletal: 5/5 strength b/l upper and lower extremities except right wrist.  Skin: No rashes    MEDICATIONS  (STANDING):  buPROPion XL . 300 milliGRAM(s) Oral daily  carbidopa 25 milliGRAM(s) Oral <User Schedule>  carbidopa/levodopa  25/100 2 Tablet(s) Oral <User Schedule>  carbidopa/levodopa CR 50/200 1 Tablet(s) Oral <User Schedule>  donepezil 10 milliGRAM(s) Oral at bedtime  enoxaparin Injectable 40 milliGRAM(s) SubCutaneous every 24 hours  folic acid 1 milliGRAM(s) Oral daily  LORazepam     Tablet 0.5 milliGRAM(s) Oral two times a day  memantine 5 milliGRAM(s) Oral two times a day  midodrine 10 milliGRAM(s) Oral <User Schedule>  midodrine 5 milliGRAM(s) Oral <User Schedule>    LABS: All Labs Reviewed:                        13.3   9.43  )-----------( 268      ( 06 Jun 2019 11:24 )             41.7     06-06    141  |  104  |  17  ----------------------------<  112<H>  4.4   |  28  |  1.15    Ca    9.5      06 Jun 2019 11:24  Mg     2.6     06-06    TPro  7.1  /  Alb  3.8  /  TBili  0.4  /  DBili  x   /  AST  13<L>  /  ALT  11<L>  /  AlkPhos  101  06-06  CARDIAC MARKERS ( 06 Jun 2019 11:24 )  <0.015 ng/mL / x     / x     / x     / x           CT Head No Cont (06.06.19 @ 11:11) >  1)  mild volume loss again noted along with borderline prominence of the   ventricles. No acute infarct or hemorrhage suggested. Follow-up MR   imaging advised.  2)  clear sinuses and mastoids..      ASSESSMENT AND PLAN:     This 73 y/o female with PMHx advanced Parkinson's disease, orthostatic hypotension and anxiety brought in by her  for ongoing body shaking at home and worsening unsteady gait.     # Fall  # Right Wrist Fracture 2/2 fall  # Body Shaking- suspect 2/2 severe orthostasis as this occurs with changes in position.   - no signs of infection, UA with pyuria. Hold off on abx, f/u final urine culture.   - appreciate neuro input --> check EEG r/o seizure  - Check orthostatics  -Continue Midodrine 10 mg in the Am and 5 mg in the PM.  - PT consult/ fall precautions.   - check B12, folate    # Parkinsons Disease - continue home meds, discussed w/ pharmacy dosing requirements.     DVT ppx: lovenox  Code Status: Full code per discussion with patient and .   Dispo; admit to inpatient telemetry for further monitoring.     Discussed w/ staff, , RN and Neuro

## 2019-06-06 NOTE — ED ADULT TRIAGE NOTE - CHIEF COMPLAINT QUOTE
Brought in by  for increased frequency of episodes of tremors and "glassy stare". Patient does not remember episodes. Patient has had these episodes for "months" per  but it has been happening more frequent. This morning patient had 5 episodes. Patient hx of fall 8 days ago with negative MRI (per ) and aide believes these episodes have increased since her fall.

## 2019-06-06 NOTE — CONSULT NOTE ADULT - SUBJECTIVE AND OBJECTIVE BOX
Patient is a 74y old  Female who presents with a chief complaint of episodes of unresponsivness    HPI: Ms. Dodge is a 74 year old woman with a history of Parkinson's disease, cognitive impairment, anxiety who presents to the ED with episodes of staring/shaking/unresponsiveness. Her  reports that these have been occurring intermittently for several days. He reports that they only occur when she stands up. Her arms will start to shake and it appears that her legs are going to give out. She has a blank stare during these times.   Her  discussed this with Dr. Padilla who suggested increasing the dose of Midodrine from 5 mg BID up to 10 mg bid. He has increased it to 10 mg in the AM and 5 mg in the evening but it did not seem to help.  She was treated for a UTI about 7 weeks ago which was antibiotic resistant. She has not had any recent fevers.       PAST MEDICAL & SURGICAL HISTORY:  Parkinson disease  Anxiety  Detached Retina, Left: and macular hole 5/2011  S/P Cataract Surgery: 2011  Macular Hole: 2010  Retinal Tear: 2003      FAMILY HISTORY: non-contributory      Social Hx:  Nonsmoker, no drug or alcohol use    MEDICATIONS  (STANDING):  buPROPion XL . 300 milliGRAM(s) Oral daily  carbidopa 25 milliGRAM(s) Oral <User Schedule>  carbidopa/levodopa  25/100 2 Tablet(s) Oral <User Schedule>  carbidopa/levodopa CR 50/200 1 Tablet(s) Oral <User Schedule>  donepezil 10 milliGRAM(s) Oral at bedtime  enoxaparin Injectable 40 milliGRAM(s) SubCutaneous every 24 hours  folic acid 1 milliGRAM(s) Oral daily  LORazepam     Tablet 0.5 milliGRAM(s) Oral two times a day  memantine 5 milliGRAM(s) Oral two times a day  midodrine 10 milliGRAM(s) Oral <User Schedule>  midodrine 5 milliGRAM(s) Oral <User Schedule>       Allergies    No Known Allergies    Intolerances        ROS: Pertinent positives in HPI, all other ROS were reviewed and are negative.      Vital Signs Last 24 Hrs  T(C): 37.1 (06 Jun 2019 10:39), Max: 37.1 (06 Jun 2019 10:39)  T(F): 98.7 (06 Jun 2019 10:39), Max: 98.7 (06 Jun 2019 10:39)  HR: 78 (06 Jun 2019 14:36) (70 - 82)  BP: 153/81 (06 Jun 2019 14:36) (90/69 - 153/81)  BP(mean): --  RR: 18 (06 Jun 2019 14:36) (17 - 18)  SpO2: 93% (06 Jun 2019 14:36) (93% - 96%)        Constitutional: awake and alert.  HEENT: PERRLA, EOMI,   Neck: Supple.  Respiratory: Breath sounds are clear bilaterally  Cardiovascular: S1 and S2, regular / irregular rhythm  Gastrointestinal: soft, nontender  Extremities:  no edema  Vascular: Carotid Bruit - no  Musculoskeletal: no joint swelling/tenderness, no abnormal movements  Skin: No rashes    Neurological exam:  HF: Awake and alert.  Appropriately interactive, normal affect. Speech fluent, No Aphasia or paraphasic errors.  CN: DEBBIE, EOMI, VFF, facial sensation normal, no NLFD, tongue midline, Palate moves equally, SCM equal bilaterally  Motor: No pronator drift, Strength 5/5 in all 4 ext, mild bradykinesia, increased tone in extremities  Sens: Intact to light touch   Reflexes: Symmetric and normal . BJ 2+, BR 2+, KJ 2+, downgoing toes b/l  Coord:  bilateral rest tremor, increased tone in upper extremities, finger tap intact      NIHSS:          Labs:   06-06    141  |  104  |  17  ----------------------------<  112<H>  4.4   |  28  |  1.15    Ca    9.5      06 Jun 2019 11:24  Mg     2.6     06-06    TPro  7.1  /  Alb  3.8  /  TBili  0.4  /  DBili  x   /  AST  13<L>  /  ALT  11<L>  /  AlkPhos  101  06-06                              13.3   9.43  )-----------( 268      ( 06 Jun 2019 11:24 )             41.7       Radiology:  CT brain 6/6/19;  1)  mild volume loss again noted along with borderline prominence of the   ventricles. No acute infarct or hemorrhage suggested. Follow-up MR   imaging advised.  2)  clear sinuses and mastoids..

## 2019-06-06 NOTE — ED PROVIDER NOTE - CLINICAL SUMMARY MEDICAL DECISION MAKING FREE TEXT BOX
Pt presents with recurrent episodes of AMS per the  with "shaking". DDX including progressions of parkinsonisms disease, use of medications, seizures, infectious or metabolic causes. Plan for CT head, EKG, labs, UA and discuss with neurologist. Pt presents with recurrent episodes of AMS per the  with "shaking". DDX including progressions of Parkinson's disease, use of medications, seizures, infectious or metabolic causes. Plan for CT head, EKG, labs, UA and discuss with neurologist.

## 2019-06-06 NOTE — ED PROVIDER NOTE - OBJECTIVE STATEMENT
75 y/o female with a PMHx of Parkinson's disease, Anxiety presents to the ED c/o intermittent episodes of AMS for the past few months. As per  at bedside pt fell 8 days ago.  states the pt has been having episodes of unresponsiveness where he is unable to communicate with the pt. States the pt becomes "limp" and falls, but is always caught by the  or aid. Pt develops generalized shakes. After the episode pt has generalized weakness. States she has been feeling dizziness and nauseous recently. Ambulatory with a walker and sometimes uses a wheelchair. Lately the pt has been using a wheelchair. Denies fever, chills. Neuro: Dr. Juan A Padilla. PMD: Dr. Virgil Obrien.

## 2019-06-06 NOTE — ED PROVIDER NOTE - NEUROLOGICAL, MLM
Alert and oriented, no focal deficits, no motor or sensory deficits. tremor in both upper extremities. no pronator drift. rigidity in upper extremities.

## 2019-06-06 NOTE — ED STATDOCS - PROGRESS NOTE DETAILS
Scribe CD for ED attending, Doctor Whalen. 73 y/o female with a PMHx of Parkinson's disease  presents to the ED c/o episodes of "glassy stare" and tremors frequently s/p fall 8 days ago. As per , "you can't communicate with her during these episodes, she does not respond." Pt does not remember these episodes. +dizziness, nausea. Denies fever, chills. Pt had MRI 8 days ago which was negative. Will send pt to main ED for further evaluation. Pt's blood pressure 90/69.

## 2019-06-07 ENCOUNTER — TRANSCRIPTION ENCOUNTER (OUTPATIENT)
Age: 74
End: 2019-06-07

## 2019-06-07 PROCEDURE — 99232 SBSQ HOSP IP/OBS MODERATE 35: CPT

## 2019-06-07 PROCEDURE — 95816 EEG AWAKE AND DROWSY: CPT | Mod: 26

## 2019-06-07 RX ORDER — PANTOPRAZOLE SODIUM 20 MG/1
40 TABLET, DELAYED RELEASE ORAL
Refills: 0 | Status: DISCONTINUED | OUTPATIENT
Start: 2019-06-08 | End: 2019-06-10

## 2019-06-07 RX ORDER — PANTOPRAZOLE SODIUM 20 MG/1
40 TABLET, DELAYED RELEASE ORAL ONCE
Refills: 0 | Status: COMPLETED | OUTPATIENT
Start: 2019-06-07 | End: 2019-06-07

## 2019-06-07 RX ADMIN — CARBIDOPA AND LEVODOPA 1 TABLET(S): 25; 100 TABLET ORAL at 21:27

## 2019-06-07 RX ADMIN — Medication 25 MILLIGRAM(S): at 21:28

## 2019-06-07 RX ADMIN — PANTOPRAZOLE SODIUM 40 MILLIGRAM(S): 20 TABLET, DELAYED RELEASE ORAL at 16:29

## 2019-06-07 RX ADMIN — ENOXAPARIN SODIUM 40 MILLIGRAM(S): 100 INJECTION SUBCUTANEOUS at 18:10

## 2019-06-07 RX ADMIN — Medication 25 MILLIGRAM(S): at 11:38

## 2019-06-07 RX ADMIN — Medication 0.5 MILLIGRAM(S): at 16:29

## 2019-06-07 RX ADMIN — MEMANTINE HYDROCHLORIDE 5 MILLIGRAM(S): 10 TABLET ORAL at 05:19

## 2019-06-07 RX ADMIN — BUPROPION HYDROCHLORIDE 300 MILLIGRAM(S): 150 TABLET, EXTENDED RELEASE ORAL at 11:41

## 2019-06-07 RX ADMIN — CARBIDOPA AND LEVODOPA 2 TABLET(S): 25; 100 TABLET ORAL at 05:16

## 2019-06-07 RX ADMIN — CARBIDOPA AND LEVODOPA 2 TABLET(S): 25; 100 TABLET ORAL at 13:19

## 2019-06-07 RX ADMIN — MEMANTINE HYDROCHLORIDE 5 MILLIGRAM(S): 10 TABLET ORAL at 18:08

## 2019-06-07 RX ADMIN — Medication 1 MILLIGRAM(S): at 11:37

## 2019-06-07 RX ADMIN — CARBIDOPA AND LEVODOPA 2 TABLET(S): 25; 100 TABLET ORAL at 16:21

## 2019-06-07 RX ADMIN — Medication 25 MILLIGRAM(S): at 16:21

## 2019-06-07 RX ADMIN — CARBIDOPA AND LEVODOPA 2 TABLET(S): 25; 100 TABLET ORAL at 11:39

## 2019-06-07 RX ADMIN — DONEPEZIL HYDROCHLORIDE 10 MILLIGRAM(S): 10 TABLET, FILM COATED ORAL at 21:27

## 2019-06-07 RX ADMIN — Medication 0.5 MILLIGRAM(S): at 05:16

## 2019-06-07 RX ADMIN — Medication 25 MILLIGRAM(S): at 13:19

## 2019-06-07 RX ADMIN — CARBIDOPA AND LEVODOPA 2 TABLET(S): 25; 100 TABLET ORAL at 21:27

## 2019-06-07 RX ADMIN — SERTRALINE 200 MILLIGRAM(S): 25 TABLET, FILM COATED ORAL at 11:41

## 2019-06-07 NOTE — PHYSICAL THERAPY INITIAL EVALUATION ADULT - PERTINENT HX OF CURRENT PROBLEM, REHAB EVAL
presents to the ED with episodes of staring/shaking/unresponsiveness. Her  reports that these have been occurring intermittently for several days

## 2019-06-07 NOTE — PHYSICAL THERAPY INITIAL EVALUATION ADULT - ACTIVE RANGE OF MOTION EXAMINATION, REHAB EVAL
PHYLLIS BURROUGHS WFL except R wrist NT, able to wiggle fingers, BLE hip/knee flex ~70/90 deg, knee ext ~0 deg, DF ~0

## 2019-06-07 NOTE — PROGRESS NOTE ADULT - SUBJECTIVE AND OBJECTIVE BOX
CHIEF COMPLAINT/Diagnosis:     SUBJECTIVE:     REVIEW OF SYSTEMS:    CONSTITUTIONAL: No weakness, fevers or chills  EYES/ENT: No visual changes;  No vertigo or throat pain   NECK: No pain or stiffness  RESPIRATORY: No cough, wheezing, hemoptysis; No shortness of breath  CARDIOVASCULAR: No chest pain or palpitations  GASTROINTESTINAL: No abdominal or epigastric pain. No nausea, vomiting, or hematemesis; No diarrhea or constipation. No melena or hematochezia.  GENITOURINARY: No dysuria, frequency or hematuria  NEUROLOGICAL: No numbness or weakness  SKIN: No itching, burning, rashes, or lesions   All other review of systems is negative unless indicated above    Vital Signs Last 24 Hrs  T(C): 37.1 (07 Jun 2019 04:33), Max: 37.1 (06 Jun 2019 10:39)  T(F): 98.7 (07 Jun 2019 04:33), Max: 98.7 (06 Jun 2019 10:39)  HR: 71 (07 Jun 2019 04:33) (70 - 82)  BP: 155/71 (07 Jun 2019 04:33) (90/69 - 156/75)  BP(mean): --  RR: 18 (06 Jun 2019 14:36) (17 - 18)  SpO2: 95% (07 Jun 2019 04:33) (95% - 96%)    I&O's Summary      CAPILLARY BLOOD GLUCOSE          PHYSICAL EXAM:    Constitutional: NAD, awake and alert, well-developed  HEENT: PERR, EOMI, Normal Hearing, MMM  Neck: Soft and supple, No LAD, No JVD  Respiratory: Breath sounds are clear bilaterally, No wheezing, rales or rhonchi  Cardiovascular: S1 and S2, regular rate and rhythm, no Murmurs, gallops or rubs  Gastrointestinal: Bowel Sounds present, soft, nontender, nondistended, no guarding, no rebound  Extremities: No peripheral edema  Vascular: 2+ peripheral pulses  Neurological: A/O x 3, no focal deficits  Musculoskeletal: 5/5 strength b/l upper and lower extremities  Skin: No rashes    MEDICATIONS:  MEDICATIONS  (STANDING):  buPROPion XL . 300 milliGRAM(s) Oral daily  carbidopa 25 milliGRAM(s) Oral <User Schedule>  carbidopa/levodopa  25/100 2 Tablet(s) Oral <User Schedule>  carbidopa/levodopa CR 50/200 1 Tablet(s) Oral <User Schedule>  donepezil 10 milliGRAM(s) Oral at bedtime  enoxaparin Injectable 40 milliGRAM(s) SubCutaneous every 24 hours  folic acid 1 milliGRAM(s) Oral daily  LORazepam     Tablet 0.5 milliGRAM(s) Oral two times a day  memantine 5 milliGRAM(s) Oral two times a day  midodrine 10 milliGRAM(s) Oral <User Schedule>  midodrine 5 milliGRAM(s) Oral <User Schedule>  sertraline 200 milliGRAM(s) Oral daily      LABS: All Labs Reviewed:                        13.3   9.43  )-----------( 268      ( 06 Jun 2019 11:24 )             41.7     06-06    141  |  104  |  17  ----------------------------<  112<H>  4.4   |  28  |  1.15    Ca    9.5      06 Jun 2019 11:24  Mg     2.6     06-06    TPro  7.1  /  Alb  3.8  /  TBili  0.4  /  DBili  x   /  AST  13<L>  /  ALT  11<L>  /  AlkPhos  101  06-06      CARDIAC MARKERS ( 06 Jun 2019 11:24 )  <0.015 ng/mL / x     / x     / x     / x                ASSESSMENT AND PLAN:     This 75 y/o female with PMHx advanced Parkinson's disease, orthostatic hypotension and anxiety brought in by her  for ongoing body shaking at home and worsening unsteady gait.     # Fall  # Right Wrist Fracture 2/2 fall  # Body Shaking- suspect 2/2 severe orthostasis as this occurs with changes in position.   - no signs of infection, UA with pyuria. Hold off on abx, f/u final urine culture.   - appreciate neuro input --> check EEG r/o seizure  - Check orthostatics  -Continue Midodrine 10 mg in the Am and 5 mg in the PM.  - PT consult/ fall precautions.   - check B12, folate    # Parkinsons Disease - continue home meds, discussed w/ pharmacy dosing requirements.     DVT ppx: lovenox  Code Status: Full code per discussion with patient and .   Dispo; admit to inpatient telemetry for further monitoring.     Discussed w/ staff, , RN and Neuro CHIEF COMPLAINT/Diagnosis: parkinsons disease/ fall w/ shaking, possibly related to worsening parkinson vs hypotension orthostatic.    SUBJECTIVE: no complaints    REVIEW OF SYSTEMS:    CONSTITUTIONAL: No weakness, fevers or chills  EYES/ENT: No visual changes;  No vertigo or throat pain   NECK: No pain or stiffness  RESPIRATORY: No cough, wheezing, hemoptysis; No shortness of breath  CARDIOVASCULAR: No chest pain or palpitations  GASTROINTESTINAL: No abdominal or epigastric pain. No nausea, vomiting, or hematemesis; No diarrhea or constipation. No melena or hematochezia.  GENITOURINARY: No dysuria, frequency or hematuria  NEUROLOGICAL: No numbness or weakness  SKIN: No itching, burning, rashes, or lesions   All other review of systems is negative unless indicated above    Vital Signs Last 24 Hrs  T(C): 37.1 (07 Jun 2019 04:33), Max: 37.1 (06 Jun 2019 10:39)  T(F): 98.7 (07 Jun 2019 04:33), Max: 98.7 (06 Jun 2019 10:39)  HR: 71 (07 Jun 2019 04:33) (70 - 82)  BP: 155/71 (07 Jun 2019 04:33) (90/69 - 156/75)  BP(mean): --  RR: 18 (06 Jun 2019 14:36) (17 - 18)  SpO2: 95% (07 Jun 2019 04:33) (95% - 96%)    I&O's Summary      CAPILLARY BLOOD GLUCOSE          PHYSICAL EXAM:    Constitutional: NAD, awake and alert, well-developed  HEENT: PERR, EOMI, Normal Hearing, MMM  Neck: Soft and supple, No LAD, No JVD  Respiratory: Breath sounds are clear bilaterally, No wheezing, rales or rhonchi  Cardiovascular: S1 and S2, regular rate and rhythm, no Murmurs, gallops or rubs  Gastrointestinal: Bowel Sounds present, soft, nontender, nondistended, no guarding, no rebound  Extremities: No peripheral edema  Vascular: 2+ peripheral pulses  Neurological: A/O x 3, no focal deficits  Musculoskeletal: 5/5 strength b/l upper and lower extremities  Skin: No rashes    MEDICATIONS:  MEDICATIONS  (STANDING):  buPROPion XL . 300 milliGRAM(s) Oral daily  carbidopa 25 milliGRAM(s) Oral <User Schedule>  carbidopa/levodopa  25/100 2 Tablet(s) Oral <User Schedule>  carbidopa/levodopa CR 50/200 1 Tablet(s) Oral <User Schedule>  donepezil 10 milliGRAM(s) Oral at bedtime  enoxaparin Injectable 40 milliGRAM(s) SubCutaneous every 24 hours  folic acid 1 milliGRAM(s) Oral daily  LORazepam     Tablet 0.5 milliGRAM(s) Oral two times a day  memantine 5 milliGRAM(s) Oral two times a day  midodrine 10 milliGRAM(s) Oral <User Schedule>  midodrine 5 milliGRAM(s) Oral <User Schedule>  sertraline 200 milliGRAM(s) Oral daily      LABS: All Labs Reviewed:                        13.3   9.43  )-----------( 268      ( 06 Jun 2019 11:24 )             41.7     06-06    141  |  104  |  17  ----------------------------<  112<H>  4.4   |  28  |  1.15    Ca    9.5      06 Jun 2019 11:24  Mg     2.6     06-06    TPro  7.1  /  Alb  3.8  /  TBili  0.4  /  DBili  x   /  AST  13<L>  /  ALT  11<L>  /  AlkPhos  101  06-06      CARDIAC MARKERS ( 06 Jun 2019 11:24 )  <0.015 ng/mL / x     / x     / x     / x                ASSESSMENT AND PLAN:     This 73 y/o female with PMHx advanced Parkinson's disease, orthostatic hypotension and anxiety brought in by her  for ongoing body shaking at home and worsening unsteady gait.     # Fall fall w/ shaking, possibly related to worsening parkinson vs hypotension orthostatic.  -Right Wrist Fracture 2/2 fall  -Body Shaking- suspect 2/2 severe orthostasis as this occurs with changes in position.   - no signs of infection, UA with pyuria. Hold off on abx, f/u final urine culture.   - appreciate neuro input --> EEG negative seizure. Advice to c/ reccomended dose of sinemet.   - orthostatics unattainable secondary to patient unable to stand fully   -Continue Midodrine 10 mg in the Am and 5 mg in the PM.  - PT consult/ fall precautions.   - check B12, folate    # Parkinsons Disease - continue home meds, discussed w/ pharmacy dosing requirements.   -patient to go inpatient rehab with dedicated unit for parkinsons disese    DVT ppx: lovenox  Code Status: Full code per discussion with patient and .   Dispo; admit to inpatient telemetry for further monitoring.

## 2019-06-07 NOTE — PROGRESS NOTE ADULT - ASSESSMENT
Ms. Dodge is 74 year old woman with Parkinson's disease with recent episodes of shaking of upper extremities and staring/unresponsiveness upon standing.  This did not improve with increased dose of Midodrine.  Likely orthostatic/autonomic dysfunction in setting of Parkinson's.  -Continue carbidopa/levodopa 25/100 2 tabs five times per day (home dosages are given at 7, 10, 1, 4, 7)   -Carbidopa 25 mg 1 tablet five times per day  -Carbidopa/levodopa CR 50/200 qhs    -Check orthostatics  -Continue Midodrine 10 mg in the Am and 5 mg in the PM.    -Seizure is less likely given pattern of events with standing but will check routine EEG.    -F/U urine culture.     I discussed the case with her neurologist, Dr. Padilla. If Midodrine does not help, Northera can be tried but this is not available in the hospital and there may be insurance issues. He also believes that anxiety is a major factor.   -I have also left a message for Dr. Coello at [facility] to see if she is a candidate for parkinson's rehab at [facility].      6/7/19:   No new events.  EEG does not show any epileptiform activity.  Continue current regimen of Parkinson's medications - Sinemet 25/100 2 tabs 5 times per day, carbidopa 25 mg five times per day, Sinemet CR 50/200 qhs.  Continue Midodrine. Can increase to 10 mg BID if needed.  Follow-up urine culture.  I discussed the case with Dr. Coello, director of Parkinson's rehab program at [facility]. She will be screened to see if she is a candidate for the program. Patient and her  are agreeable if this is an option.    Dr. Stone will take over for neurology consults on 6/8/19 and will follow up as needed.

## 2019-06-07 NOTE — PHYSICAL THERAPY INITIAL EVALUATION ADULT - MODALITIES TREATMENT COMMENTS
+++ retropulsion in sitting EOB, stand, scissoring of LEs in sitting EOB-requires manual correction, feet blocked for stand attempt

## 2019-06-07 NOTE — DISCHARGE NOTE NURSING/CASE MANAGEMENT/SOCIAL WORK - NSDCDPATPORTLINK_GEN_ALL_CORE
You can access the [facility] Patient Portal, offered by [facility], by registering with the following website: [email or web address]

## 2019-06-07 NOTE — PHYSICAL THERAPY INITIAL EVALUATION ADULT - LEVEL OF INDEPENDENCE: GAIT, REHAB EVAL
unable to perform/pt performed static stand w/ walker support to RUE x few min for orthostatic BP to be done-unsuccessful

## 2019-06-07 NOTE — PHYSICAL THERAPY INITIAL EVALUATION ADULT - DIAGNOSIS, PT EVAL
Likely orthostatic/autonomic dysfunction in setting of Parkinson's. Fall, Right Wrist Fracture 2/2 fall, orthostasis

## 2019-06-07 NOTE — PHYSICAL THERAPY INITIAL EVALUATION ADULT - GENERAL OBSERVATIONS, REHAB EVAL
pt rec'd supine in bed on 3E, +HM, splint to R wrist. ( +ND distal radial styloid fx). spouse arriving later in encounter and able to clarify PLOF/home environment.

## 2019-06-07 NOTE — PROGRESS NOTE ADULT - SUBJECTIVE AND OBJECTIVE BOX
Interval History:  19: No new events. Orthostatics attempted but patient was unable to stand.    MEDICATIONS  (STANDING):  buPROPion XL . 300 milliGRAM(s) Oral daily  carbidopa 25 milliGRAM(s) Oral <User Schedule>  carbidopa/levodopa  25/100 2 Tablet(s) Oral <User Schedule>  carbidopa/levodopa CR 50/200 1 Tablet(s) Oral <User Schedule>  donepezil 10 milliGRAM(s) Oral at bedtime  enoxaparin Injectable 40 milliGRAM(s) SubCutaneous every 24 hours  folic acid 1 milliGRAM(s) Oral daily  LORazepam     Tablet 0.5 milliGRAM(s) Oral two times a day  memantine 5 milliGRAM(s) Oral two times a day  midodrine 10 milliGRAM(s) Oral <User Schedule>  midodrine 5 milliGRAM(s) Oral <User Schedule>  sertraline 200 milliGRAM(s) Oral daily    MEDICATIONS  (PRN):  acetaminophen   Tablet .. 650 milliGRAM(s) Oral every 6 hours PRN Mild Pain (1 - 3)  docusate sodium 100 milliGRAM(s) Oral two times a day PRN Constipation  LORazepam   Injectable 0.5 milliGRAM(s) IV Push once PRN Seizure activity  magnesium hydroxide Suspension 30 milliLiter(s) Oral daily PRN Constipation  ondansetron Injectable 4 milliGRAM(s) IV Push every 4 hours PRN Nausea and/or Vomiting  senna 2 Tablet(s) Oral at bedtime PRN Constipation      Allergies    No Known Allergies    Intolerances        PHYSICAL EXAM:  Vital Signs Last 24 Hrs  T(F): 97.7 (19 @ 10:24)  HR: 77 (19 @ 10:24)  BP: 162/85 (19 @ 10:24)  RR: 18 (19 @ 14:36)    GENERAL: NAD, well-groomed, well-developed  HEAD:  Atraumatic, Normocephalic  Neuro:  Awake, alert, no aphasia  CN: PERRL, EOMI, no nystagmus, no facial weakness, tongue protrudes in the midline  motor: increased tone. No focal weakness in upper extremities. Mild weakness in lower extremities  sensory: intact to light touch  coordination: + rest tremor      LABS:                        13.3   9.43  )-----------( 268      ( 2019 11:24 )             41.7         141  |  104  |  17  ----------------------------<  112<H>  4.4   |  28  |  1.15    Ca    9.5      2019 11:24  Mg     2.6         TPro  7.1  /  Alb  3.8  /  TBili  0.4  /  DBili  x   /  AST  13<L>  /  ALT  11<L>  /  AlkPhos  101        Urinalysis Basic - ( 2019 11:24 )    Color: Yellow / Appearance: Clear / S.010 / pH: x  Gluc: x / Ketone: Negative  / Bili: Negative / Urobili: Negative mg/dL   Blood: x / Protein: 15 mg/dL / Nitrite: Negative   Leuk Esterase: Small / RBC: Negative /HPF / WBC 3-5   Sq Epi: x / Non Sq Epi: x / Bacteria: Many        RADIOLOGY & ADDITIONAL STUDIES:  CT head no contrast 19:     1)  mild volume loss again noted along with borderline prominence of the   ventricles. No acute infarct or hemorrhage suggested. Follow-up MR   imaging advised.  2)  clear sinuses and mastoids..        EEG 19: Mild generalized slowing

## 2019-06-07 NOTE — HISTORY OF PRESENT ILLNESS
[FreeTextEntry1] : dx PD 2016 - wheelchair to ambulation with sinimet. \par \par Anxiety for man years tried many benzo. Anxiety form childhood. Last couple years she has increased with anxiety. on lorazepam .5 bid from xanax. On Bupropian 300 mgs; Zoloft 200mgs qd. Horrible nightmares. ? visual hallucinations vs visual disturbances. \par \par Goal: improvement of anxiety.

## 2019-06-08 RX ORDER — MEROPENEM 1 G/30ML
INJECTION INTRAVENOUS
Refills: 0 | Status: DISCONTINUED | OUTPATIENT
Start: 2019-06-08 | End: 2019-06-08

## 2019-06-08 RX ORDER — MEROPENEM 1 G/30ML
INJECTION INTRAVENOUS
Refills: 0 | Status: DISCONTINUED | OUTPATIENT
Start: 2019-06-08 | End: 2019-06-09

## 2019-06-08 RX ORDER — MEROPENEM 1 G/30ML
1000 INJECTION INTRAVENOUS EVERY 12 HOURS
Refills: 0 | Status: DISCONTINUED | OUTPATIENT
Start: 2019-06-09 | End: 2019-06-09

## 2019-06-08 RX ORDER — MEROPENEM 1 G/30ML
1000 INJECTION INTRAVENOUS ONCE
Refills: 0 | Status: COMPLETED | OUTPATIENT
Start: 2019-06-08 | End: 2019-06-08

## 2019-06-08 RX ADMIN — PANTOPRAZOLE SODIUM 40 MILLIGRAM(S): 20 TABLET, DELAYED RELEASE ORAL at 06:08

## 2019-06-08 RX ADMIN — MEMANTINE HYDROCHLORIDE 5 MILLIGRAM(S): 10 TABLET ORAL at 06:31

## 2019-06-08 RX ADMIN — CARBIDOPA AND LEVODOPA 2 TABLET(S): 25; 100 TABLET ORAL at 12:44

## 2019-06-08 RX ADMIN — BUPROPION HYDROCHLORIDE 300 MILLIGRAM(S): 150 TABLET, EXTENDED RELEASE ORAL at 12:42

## 2019-06-08 RX ADMIN — MIDODRINE HYDROCHLORIDE 10 MILLIGRAM(S): 2.5 TABLET ORAL at 09:11

## 2019-06-08 RX ADMIN — CARBIDOPA AND LEVODOPA 2 TABLET(S): 25; 100 TABLET ORAL at 18:21

## 2019-06-08 RX ADMIN — CARBIDOPA AND LEVODOPA 1 TABLET(S): 25; 100 TABLET ORAL at 21:30

## 2019-06-08 RX ADMIN — DONEPEZIL HYDROCHLORIDE 10 MILLIGRAM(S): 10 TABLET, FILM COATED ORAL at 21:30

## 2019-06-08 RX ADMIN — MEROPENEM 100 MILLIGRAM(S): 1 INJECTION INTRAVENOUS at 15:11

## 2019-06-08 RX ADMIN — Medication 0.5 MILLIGRAM(S): at 18:21

## 2019-06-08 RX ADMIN — CARBIDOPA AND LEVODOPA 2 TABLET(S): 25; 100 TABLET ORAL at 09:13

## 2019-06-08 RX ADMIN — Medication 25 MILLIGRAM(S): at 06:08

## 2019-06-08 RX ADMIN — CARBIDOPA AND LEVODOPA 2 TABLET(S): 25; 100 TABLET ORAL at 15:12

## 2019-06-08 RX ADMIN — ENOXAPARIN SODIUM 40 MILLIGRAM(S): 100 INJECTION SUBCUTANEOUS at 18:22

## 2019-06-08 RX ADMIN — MIDODRINE HYDROCHLORIDE 5 MILLIGRAM(S): 2.5 TABLET ORAL at 19:19

## 2019-06-08 RX ADMIN — SERTRALINE 200 MILLIGRAM(S): 25 TABLET, FILM COATED ORAL at 12:44

## 2019-06-08 RX ADMIN — Medication 25 MILLIGRAM(S): at 12:43

## 2019-06-08 RX ADMIN — MEMANTINE HYDROCHLORIDE 5 MILLIGRAM(S): 10 TABLET ORAL at 18:21

## 2019-06-08 RX ADMIN — Medication 1 MILLIGRAM(S): at 12:43

## 2019-06-08 RX ADMIN — CARBIDOPA AND LEVODOPA 2 TABLET(S): 25; 100 TABLET ORAL at 06:08

## 2019-06-08 RX ADMIN — Medication 25 MILLIGRAM(S): at 15:12

## 2019-06-08 RX ADMIN — Medication 0.5 MILLIGRAM(S): at 06:08

## 2019-06-08 RX ADMIN — Medication 25 MILLIGRAM(S): at 09:12

## 2019-06-08 RX ADMIN — Medication 25 MILLIGRAM(S): at 18:22

## 2019-06-08 NOTE — PROGRESS NOTE ADULT - SUBJECTIVE AND OBJECTIVE BOX
ASSESSMENT AND PLAN:     This 73 y/o female with PMHx advanced Parkinson's disease, orthostatic hypotension and anxiety brought in by her  for ongoing body shaking at home and worsening unsteady gait.     # Fall fall w/ shaking, possibly related to worsening parkinson vs hypotension orthostatic.  -Right Wrist Fracture 2/2 fall  -Body Shaking- suspect 2/2 severe orthostasis as this occurs with changes in position.   - no signs of infection, UA with pyuria. Hold off on abx, f/u final urine culture.   - appreciate neuro input --> EEG negative seizure. Advice to c/ reccomended dose of sinemet.   - orthostatics unattainable secondary to patient unable to stand fully   -Continue Midodrine 10 mg in the Am and 5 mg in the PM.  - PT consult/ fall precautions.   - check B12, folate    # Parkinsons Disease - continue home meds, discussed w/ pharmacy dosing requirements.   -patient to go inpatient rehab with dedicated unit for parkinsons disese    DVT ppx: lovenox  Code Status: Full code per discussion with patient and .   Dispo; admit to inpatient telemetry for further monitoring. CHIEF COMPLAINT/Diagnosis: fall/ decompensation/ shaking/ ESBL UTI    SUBJECTIVE: no complaints    REVIEW OF SYSTEMS:    CONSTITUTIONAL: No weakness, fevers or chills  EYES/ENT: No visual changes;  No vertigo or throat pain   NECK: No pain or stiffness  RESPIRATORY: No cough, wheezing, hemoptysis; No shortness of breath  CARDIOVASCULAR: No chest pain or palpitations  GASTROINTESTINAL: No abdominal or epigastric pain. No nausea, vomiting, or hematemesis; No diarrhea or constipation. No melena or hematochezia.  GENITOURINARY: No dysuria, frequency or hematuria  NEUROLOGICAL: No numbness or weakness  SKIN: No itching, burning, rashes, or lesions   All other review of systems is negative unless indicated above    Vital Signs Last 24 Hrs  T(C): 36.7 (08 Jun 2019 11:05), Max: 37.6 (07 Jun 2019 17:49)  T(F): 98.1 (08 Jun 2019 11:05), Max: 99.6 (07 Jun 2019 17:49)  HR: 73 (08 Jun 2019 11:05) (73 - 90)  BP: 174/79 (08 Jun 2019 11:05) (137/99 - 174/79)  BP(mean): --  RR: 18 (08 Jun 2019 11:05) (17 - 18)  SpO2: 97% (08 Jun 2019 11:05) (95% - 97%)    I&O's Summary      CAPILLARY BLOOD GLUCOSE          PHYSICAL EXAM:    Constitutional: NAD, awake and alert, well-developed  HEENT: PERR, EOMI, Normal Hearing, MMM  Neck: Soft and supple, No LAD, No JVD  Respiratory: Breath sounds are clear bilaterally, No wheezing, rales or rhonchi  Cardiovascular: S1 and S2, regular rate and rhythm, no Murmurs, gallops or rubs  Gastrointestinal: Bowel Sounds present, soft, nontender, nondistended, no guarding, no rebound  Extremities: No peripheral edema  Vascular: 2+ peripheral pulses  Neurological: A/O x 3, no focal deficits  Musculoskeletal: 5/5 strength b/l upper and lower extremities  Skin: No rashes    MEDICATIONS:  MEDICATIONS  (STANDING):  buPROPion XL . 300 milliGRAM(s) Oral daily  carbidopa 25 milliGRAM(s) Oral <User Schedule>  carbidopa/levodopa  25/100 2 Tablet(s) Oral <User Schedule>  carbidopa/levodopa CR 50/200 1 Tablet(s) Oral <User Schedule>  donepezil 10 milliGRAM(s) Oral at bedtime  enoxaparin Injectable 40 milliGRAM(s) SubCutaneous every 24 hours  folic acid 1 milliGRAM(s) Oral daily  LORazepam     Tablet 0.5 milliGRAM(s) Oral two times a day  memantine 5 milliGRAM(s) Oral two times a day  meropenem  IVPB      midodrine 10 milliGRAM(s) Oral <User Schedule>  midodrine 5 milliGRAM(s) Oral <User Schedule>  pantoprazole    Tablet 40 milliGRAM(s) Oral before breakfast  sertraline 200 milliGRAM(s) Oral daily      LABS: All Labs Reviewed:            Blood Culture: 06-06 @ 11:24  Organism Escherichia coli ESBL  Gram Stain Blood -- Gram Stain --  Specimen Source .Urine Clean Catch (Midstream)  Culture-Blood --          ASSESSMENT AND PLAN:     This 75 y/o female with PMHx advanced Parkinson's disease, orthostatic hypotension and anxiety brought in by her  for ongoing body shaking at home and worsening unsteady gait.     # Fall fall w/ shaking, likley related to decompensation from urinary tract infection  -Right Wrist Fracture 2/2 fall  -Body Shaking- was initially suspected secondary to parkinsons or orthostasis however more likley related to infection  -patient also somewehat enceophalopathhic today, saying that she has clots in her fingers? mild encephalopathy likley exists  -will start Meropenem and place infectious disease consult      # Parkinsons Disease - continue home meds, discussed w/ pharmacy dosing requirements.   -patient to go inpatient rehab with dedicated unit for parkinsons disese    DVT ppx: lovenox  Code Status: Full code per discussion with patient and .   Dispo; admit to inpatient telemetry for further monitoring.

## 2019-06-09 RX ORDER — MEROPENEM 1 G/30ML
500 INJECTION INTRAVENOUS EVERY 8 HOURS
Refills: 0 | Status: DISCONTINUED | OUTPATIENT
Start: 2019-06-09 | End: 2019-06-10

## 2019-06-09 RX ADMIN — MEROPENEM 100 MILLIGRAM(S): 1 INJECTION INTRAVENOUS at 21:30

## 2019-06-09 RX ADMIN — Medication 25 MILLIGRAM(S): at 13:07

## 2019-06-09 RX ADMIN — CARBIDOPA AND LEVODOPA 2 TABLET(S): 25; 100 TABLET ORAL at 20:28

## 2019-06-09 RX ADMIN — Medication 0.5 MILLIGRAM(S): at 15:12

## 2019-06-09 RX ADMIN — MIDODRINE HYDROCHLORIDE 10 MILLIGRAM(S): 2.5 TABLET ORAL at 08:39

## 2019-06-09 RX ADMIN — CARBIDOPA AND LEVODOPA 2 TABLET(S): 25; 100 TABLET ORAL at 13:08

## 2019-06-09 RX ADMIN — Medication 1 MILLIGRAM(S): at 13:05

## 2019-06-09 RX ADMIN — CARBIDOPA AND LEVODOPA 2 TABLET(S): 25; 100 TABLET ORAL at 17:02

## 2019-06-09 RX ADMIN — Medication 25 MILLIGRAM(S): at 10:46

## 2019-06-09 RX ADMIN — MEMANTINE HYDROCHLORIDE 5 MILLIGRAM(S): 10 TABLET ORAL at 17:02

## 2019-06-09 RX ADMIN — CARBIDOPA AND LEVODOPA 2 TABLET(S): 25; 100 TABLET ORAL at 08:32

## 2019-06-09 RX ADMIN — Medication 25 MILLIGRAM(S): at 17:02

## 2019-06-09 RX ADMIN — SERTRALINE 200 MILLIGRAM(S): 25 TABLET, FILM COATED ORAL at 13:05

## 2019-06-09 RX ADMIN — PANTOPRAZOLE SODIUM 40 MILLIGRAM(S): 20 TABLET, DELAYED RELEASE ORAL at 05:06

## 2019-06-09 RX ADMIN — ENOXAPARIN SODIUM 40 MILLIGRAM(S): 100 INJECTION SUBCUTANEOUS at 17:03

## 2019-06-09 RX ADMIN — Medication 25 MILLIGRAM(S): at 08:31

## 2019-06-09 RX ADMIN — MEROPENEM 100 MILLIGRAM(S): 1 INJECTION INTRAVENOUS at 05:06

## 2019-06-09 RX ADMIN — Medication 25 MILLIGRAM(S): at 20:28

## 2019-06-09 RX ADMIN — Medication 0.5 MILLIGRAM(S): at 05:06

## 2019-06-09 RX ADMIN — CARBIDOPA AND LEVODOPA 1 TABLET(S): 25; 100 TABLET ORAL at 21:30

## 2019-06-09 RX ADMIN — CARBIDOPA AND LEVODOPA 2 TABLET(S): 25; 100 TABLET ORAL at 10:46

## 2019-06-09 RX ADMIN — MIDODRINE HYDROCHLORIDE 5 MILLIGRAM(S): 2.5 TABLET ORAL at 17:03

## 2019-06-09 RX ADMIN — MEMANTINE HYDROCHLORIDE 5 MILLIGRAM(S): 10 TABLET ORAL at 05:06

## 2019-06-09 RX ADMIN — BUPROPION HYDROCHLORIDE 300 MILLIGRAM(S): 150 TABLET, EXTENDED RELEASE ORAL at 13:05

## 2019-06-09 RX ADMIN — DONEPEZIL HYDROCHLORIDE 10 MILLIGRAM(S): 10 TABLET, FILM COATED ORAL at 21:30

## 2019-06-09 NOTE — CONSULT NOTE ADULT - ASSESSMENT
This is a 75 y/o female with PMHx advanced Parkinson's disease, orthostatic hypotension and anxiety admitted on 6/6 for evaluation of shaking, weakness and worsening gait. The patient had a fall prior to admission and is noted with right wrist fracture that is splinted; history per medical record as patient unable to provide history.   1. Patient admitted with urinary tract infection secondary to ESBL E coli  - contact isolation  - iv hydration and supportive care   - serial cbc and monitor temperature   - reviewed prior medical records to evaluate for resistant or atypical pathogens and has had this in past  - will continue meropenem but at 500 mg q 8 hours  2. other issues; per medicine

## 2019-06-09 NOTE — PROGRESS NOTE ADULT - SUBJECTIVE AND OBJECTIVE BOX
CHIEF COMPLAINT/Diagnosis: ESBL uti/ lethargy/ weakness/ parkinsons    SUBJECTIVE: no complaints    REVIEW OF SYSTEMS:    CONSTITUTIONAL: No weakness, fevers or chills  EYES/ENT: No visual changes;  No vertigo or throat pain   NECK: No pain or stiffness  RESPIRATORY: No cough, wheezing, hemoptysis; No shortness of breath  CARDIOVASCULAR: No chest pain or palpitations  GASTROINTESTINAL: No abdominal or epigastric pain. No nausea, vomiting, or hematemesis; No diarrhea or constipation. No melena or hematochezia.  GENITOURINARY: No dysuria, frequency or hematuria  NEUROLOGICAL: No numbness or weakness  SKIN: No itching, burning, rashes, or lesions   All other review of systems is negative unless indicated above    Vital Signs Last 24 Hrs  T(C): 36.7 (09 Jun 2019 04:52), Max: 36.8 (08 Jun 2019 17:52)  T(F): 98 (09 Jun 2019 04:52), Max: 98.3 (08 Jun 2019 17:52)  HR: 79 (09 Jun 2019 04:52) (73 - 90)  BP: 139/95 (09 Jun 2019 04:52) (139/95 - 174/79)  BP(mean): --  RR: 18 (08 Jun 2019 17:52) (18 - 18)  SpO2: 94% (09 Jun 2019 04:52) (94% - 97%)    I&O's Summary    08 Jun 2019 07:01  -  09 Jun 2019 07:00  --------------------------------------------------------  IN: 250 mL / OUT: 0 mL / NET: 250 mL        CAPILLARY BLOOD GLUCOSE          PHYSICAL EXAM:    Constitutional: NAD, awake and alert, well-developed  HEENT: PERR, EOMI, Normal Hearing, MMM  Neck: Soft and supple, No LAD, No JVD  Respiratory: Breath sounds are clear bilaterally, No wheezing, rales or rhonchi  Cardiovascular: S1 and S2, regular rate and rhythm, no Murmurs, gallops or rubs  Gastrointestinal: Bowel Sounds present, soft, nontender, nondistended, no guarding, no rebound  Extremities: No peripheral edema  Vascular: 2+ peripheral pulses  Neurological: A/O x 3, no focal deficits  Musculoskeletal: 5/5 strength b/l upper and lower extremities  Skin: No rashes    MEDICATIONS:  MEDICATIONS  (STANDING):  buPROPion XL . 300 milliGRAM(s) Oral daily  carbidopa 25 milliGRAM(s) Oral <User Schedule>  carbidopa/levodopa  25/100 2 Tablet(s) Oral <User Schedule>  carbidopa/levodopa CR 50/200 1 Tablet(s) Oral <User Schedule>  donepezil 10 milliGRAM(s) Oral at bedtime  enoxaparin Injectable 40 milliGRAM(s) SubCutaneous every 24 hours  folic acid 1 milliGRAM(s) Oral daily  LORazepam     Tablet 0.5 milliGRAM(s) Oral two times a day  memantine 5 milliGRAM(s) Oral two times a day  meropenem  IVPB 1000 milliGRAM(s) IV Intermittent every 12 hours  meropenem  IVPB      midodrine 10 milliGRAM(s) Oral <User Schedule>  midodrine 5 milliGRAM(s) Oral <User Schedule>  pantoprazole    Tablet 40 milliGRAM(s) Oral before breakfast  sertraline 200 milliGRAM(s) Oral daily      LABS: All Labs Reviewed:          Blood Culture: 06-06 @ 11:24  Organism Escherichia coli ESBL  Gram Stain Blood -- Gram Stain --  Specimen Source .Urine Clean Catch (Midstream)  Culture-Blood --          ASSESSMENT AND PLAN:     This 73 y/o female with PMHx advanced Parkinson's disease, orthostatic hypotension and anxiety brought in by her  for ongoing body shaking at home and worsening unsteady gait.     # Fall fall w/ shaking, likley related to decompensation from urinary tract infection  -Right Wrist Fracture 2/2 fall  -Body Shaking- was initially suspected secondary to parkinsons or orthostasis however more likley related to infection  -patient also somewehat enceophalopathhic, improving slowly, saying that she has clots in her fingers? mild encephalopathy likley exists  -will start Meropenem and place infectious disease consult      # Parkinsons Disease - continue home meds, discussed w/ pharmacy dosing requirements.   -patient to go inpatient rehab with dedicated unit for parkinsons disese    DVT ppx: lovenox  Code Status: Full code per discussion with patient and .   Dispo; admit to inpatient telemetry for further monitoring.               Attending Attestation:   Plan discussed with patient, nursing, staff.

## 2019-06-09 NOTE — CONSULT NOTE ADULT - SUBJECTIVE AND OBJECTIVE BOX
Patient is a 74y old  Female who presents with a chief complaint of shaking, unsteady gait (09 Jun 2019 09:08)    HPI:  This is a 73 y/o female with PMHx advanced Parkinson's disease, orthostatic hypotension and anxiety admitted on 6/6 for evaluation of shaking, weakness and worsening gait. The patient had a fall prior to admission and is noted with right wrist fracture that is splinted; history per medical record as patient unable to provide history.         PAST MEDICAL & SURGICAL HISTORY:  Parkinson disease  Anxiety  Detached Retina, Left: and macular hole 5/2011  S/P Cataract Surgery: 2011  Macular Hole: 2010  Retinal Tear: 2003        PMH: as above  PSH: as above  Meds: per reconciliation sheet, noted below  MEDICATIONS  (STANDING):  buPROPion XL . 300 milliGRAM(s) Oral daily  carbidopa 25 milliGRAM(s) Oral <User Schedule>  carbidopa/levodopa  25/100 2 Tablet(s) Oral <User Schedule>  carbidopa/levodopa CR 50/200 1 Tablet(s) Oral <User Schedule>  donepezil 10 milliGRAM(s) Oral at bedtime  enoxaparin Injectable 40 milliGRAM(s) SubCutaneous every 24 hours  folic acid 1 milliGRAM(s) Oral daily  LORazepam     Tablet 0.5 milliGRAM(s) Oral two times a day  memantine 5 milliGRAM(s) Oral two times a day  meropenem  IVPB 500 milliGRAM(s) IV Intermittent every 8 hours  midodrine 10 milliGRAM(s) Oral <User Schedule>  midodrine 5 milliGRAM(s) Oral <User Schedule>  pantoprazole    Tablet 40 milliGRAM(s) Oral before breakfast  sertraline 200 milliGRAM(s) Oral daily    MEDICATIONS  (PRN):  acetaminophen   Tablet .. 650 milliGRAM(s) Oral every 6 hours PRN Mild Pain (1 - 3)  docusate sodium 100 milliGRAM(s) Oral two times a day PRN Constipation  LORazepam   Injectable 0.5 milliGRAM(s) IV Push once PRN Seizure activity  magnesium hydroxide Suspension 30 milliLiter(s) Oral daily PRN Constipation  ondansetron Injectable 4 milliGRAM(s) IV Push every 4 hours PRN Nausea and/or Vomiting  senna 2 Tablet(s) Oral at bedtime PRN Constipation    Allergies    No Known Allergies    Intolerances      Social: no smoking, no alcohol, no illegal drugs; no recent travel, no exposure to TB  FAMILY HISTORY:     no history of premature cardiovascular disease in first degree relatives  ROS: unable to obtain secondary to patient medical condition     Vital Signs Last 24 Hrs  T(C): 36.6 (09 Jun 2019 10:35), Max: 36.8 (08 Jun 2019 17:52)  T(F): 97.9 (09 Jun 2019 10:35), Max: 98.3 (08 Jun 2019 17:52)  HR: 82 (09 Jun 2019 10:35) (79 - 90)  BP: 137/64 (09 Jun 2019 10:35) (137/64 - 143/72)  BP(mean): --  RR: 18 (09 Jun 2019 10:35) (18 - 18)  SpO2: 97% (09 Jun 2019 10:35) (94% - 97%)  Daily     Daily     PE:    Constitutional: frail looking  HEENT: NC/AT, EOMI, PERRLA, conjunctivae clear; ears and nose atraumatic; pharynx clear  Neck: supple; thyroid not palpable  Back: no tenderness  Respiratory: respiratory effort normal; clear to auscultation  Cardiovascular: S1S2 regular, no murmurs  Abdomen: soft, not tender, not distended, positive BS; no liver or spleen organomegaly  Genitourinary: no suprapubic tenderness  Musculoskeletal: no muscle tenderness, right wrist in splint  Neurological/ Psychiatric: moving all extremities  Skin: no rashes; no palpable lesions    Labs: all available labs reviewed      Culture - Urine (06.06.19 @ 11:24)    -  Amikacin: S <=16    -  Ampicillin: R >16 These ampicillin results predict results for amoxicillin    -  Ampicillin/Sulbactam: R 16/8    -  Aztreonam: R >16    -  Cefazolin: R >16 For uncomplicated UTI with K. pneumoniae, E. coli, or P. mirablis: JOYCE <=16 is sensitive and JOYCE >=32 is resistant. This also predicts results for oral agents cefaclor, cefdinir, cefpodoxime, cefprozil, cefuroxime axetil, cephalexin and locarbef for uncomplicated UTI. Note that some isolates may be susceptible to these agents while testing resistant to cefazolin.    -  Cefepime: R >16    -  Cefoxitin: I 16    -  Ceftriaxone: R >32 Enterobacter, Citrobacter, and Serratia may develop resistance during prolonged therapy    -  Ciprofloxacin: R >2    -  Ertapenem: S <=1    -  Gentamicin: R >8    -  Imipenem: S <=1    -  Levofloxacin: R >4    -  Meropenem: S <=1    -  Nitrofurantoin: S <=32 Should not be used to treat pyelonephritis    -  Piperacillin/Tazobactam: R <=16    -  Tigecycline: S <=2    -  Tobramycin: R >8    -  Trimethoprim/Sulfamethoxazole: R >2/38    Specimen Source: .Urine Clean Catch (Midstream)    Culture Results:   >100,000 CFU/ml Escherichia coli ESBL    Organism Identification: Escherichia coli ESBL    Organism: Escherichia coli ESBL    Method Type: JOYCE                 Radiology: all available radiological tests reviewed    Advanced directives addressed: full resuscitation

## 2019-06-10 ENCOUNTER — INPATIENT (INPATIENT)
Facility: HOSPITAL | Age: 74
LOS: 16 days | Discharge: HOME HEALTH | DRG: 949 | End: 2019-06-27
Attending: PSYCHIATRY & NEUROLOGY | Admitting: PSYCHIATRY & NEUROLOGY
Payer: MEDICARE

## 2019-06-10 ENCOUNTER — RX RENEWAL (OUTPATIENT)
Age: 74
End: 2019-06-10

## 2019-06-10 ENCOUNTER — TRANSCRIPTION ENCOUNTER (OUTPATIENT)
Age: 74
End: 2019-06-10

## 2019-06-10 VITALS
HEART RATE: 74 BPM | TEMPERATURE: 97 F | SYSTOLIC BLOOD PRESSURE: 127 MMHG | DIASTOLIC BLOOD PRESSURE: 82 MMHG | RESPIRATION RATE: 17 BRPM | OXYGEN SATURATION: 95 %

## 2019-06-10 VITALS
HEIGHT: 63 IN | OXYGEN SATURATION: 95 % | HEART RATE: 91 BPM | RESPIRATION RATE: 15 BRPM | SYSTOLIC BLOOD PRESSURE: 163 MMHG | DIASTOLIC BLOOD PRESSURE: 81 MMHG | WEIGHT: 154.98 LBS | TEMPERATURE: 99 F

## 2019-06-10 DIAGNOSIS — G20 PARKINSON'S DISEASE: ICD-10-CM

## 2019-06-10 DIAGNOSIS — S62.109A FRACTURE OF UNSPECIFIED CARPAL BONE, UNSPECIFIED WRIST, INITIAL ENCOUNTER FOR CLOSED FRACTURE: ICD-10-CM

## 2019-06-10 DIAGNOSIS — F41.9 ANXIETY DISORDER, UNSPECIFIED: ICD-10-CM

## 2019-06-10 PROCEDURE — 99223 1ST HOSP IP/OBS HIGH 75: CPT

## 2019-06-10 RX ORDER — MEROPENEM 1 G/30ML
500 INJECTION INTRAVENOUS
Qty: 0 | Refills: 0 | DISCHARGE
Start: 2019-06-10

## 2019-06-10 RX ORDER — ENOXAPARIN SODIUM 100 MG/ML
40 INJECTION SUBCUTANEOUS AT BEDTIME
Refills: 0 | Status: DISCONTINUED | OUTPATIENT
Start: 2019-06-10 | End: 2019-06-27

## 2019-06-10 RX ORDER — MEMANTINE HYDROCHLORIDE 10 MG/1
5 TABLET ORAL
Refills: 0 | Status: DISCONTINUED | OUTPATIENT
Start: 2019-06-10 | End: 2019-06-27

## 2019-06-10 RX ORDER — MEROPENEM 1 G/30ML
500 INJECTION INTRAVENOUS EVERY 8 HOURS
Refills: 0 | Status: DISCONTINUED | OUTPATIENT
Start: 2019-06-10 | End: 2019-06-13

## 2019-06-10 RX ORDER — DONEPEZIL HYDROCHLORIDE 10 MG/1
10 TABLET, FILM COATED ORAL AT BEDTIME
Refills: 0 | Status: DISCONTINUED | OUTPATIENT
Start: 2019-06-10 | End: 2019-06-27

## 2019-06-10 RX ORDER — DOCUSATE SODIUM 100 MG
100 CAPSULE ORAL
Refills: 0 | Status: DISCONTINUED | OUTPATIENT
Start: 2019-06-10 | End: 2019-06-27

## 2019-06-10 RX ORDER — POLYETHYLENE GLYCOL 3350 17 G/17G
17 POWDER, FOR SOLUTION ORAL DAILY
Refills: 0 | Status: DISCONTINUED | OUTPATIENT
Start: 2019-06-10 | End: 2019-06-27

## 2019-06-10 RX ORDER — BUPROPION HYDROCHLORIDE 150 MG/1
300 TABLET, EXTENDED RELEASE ORAL DAILY
Refills: 0 | Status: DISCONTINUED | OUTPATIENT
Start: 2019-06-11 | End: 2019-06-21

## 2019-06-10 RX ORDER — MIDODRINE HYDROCHLORIDE 2.5 MG/1
10 TABLET ORAL
Refills: 0 | Status: DISCONTINUED | OUTPATIENT
Start: 2019-06-11 | End: 2019-06-27

## 2019-06-10 RX ORDER — MIDODRINE HYDROCHLORIDE 2.5 MG/1
5 TABLET ORAL
Refills: 0 | Status: DISCONTINUED | OUTPATIENT
Start: 2019-06-10 | End: 2019-06-27

## 2019-06-10 RX ORDER — SENNA PLUS 8.6 MG/1
2 TABLET ORAL AT BEDTIME
Refills: 0 | Status: DISCONTINUED | OUTPATIENT
Start: 2019-06-10 | End: 2019-06-27

## 2019-06-10 RX ORDER — SERTRALINE 25 MG/1
200 TABLET, FILM COATED ORAL DAILY
Refills: 0 | Status: DISCONTINUED | OUTPATIENT
Start: 2019-06-11 | End: 2019-06-24

## 2019-06-10 RX ORDER — CARBIDOPA AND LEVODOPA 25; 100 MG/1; MG/1
1 TABLET ORAL
Refills: 0 | Status: DISCONTINUED | OUTPATIENT
Start: 2019-06-10 | End: 2019-06-27

## 2019-06-10 RX ORDER — PANTOPRAZOLE SODIUM 20 MG/1
40 TABLET, DELAYED RELEASE ORAL
Refills: 0 | Status: DISCONTINUED | OUTPATIENT
Start: 2019-06-11 | End: 2019-06-27

## 2019-06-10 RX ORDER — ACETAMINOPHEN 500 MG
650 TABLET ORAL EVERY 6 HOURS
Refills: 0 | Status: DISCONTINUED | OUTPATIENT
Start: 2019-06-10 | End: 2019-06-27

## 2019-06-10 RX ORDER — CARBIDOPA AND LEVODOPA 25; 100 MG/1; MG/1
2 TABLET ORAL
Refills: 0 | Status: DISCONTINUED | OUTPATIENT
Start: 2019-06-10 | End: 2019-06-27

## 2019-06-10 RX ORDER — CARBIDOPA 25 MG
25 TABLET ORAL
Refills: 0 | Status: DISCONTINUED | OUTPATIENT
Start: 2019-06-10 | End: 2019-06-11

## 2019-06-10 RX ORDER — FOLIC ACID 0.8 MG
1 TABLET ORAL DAILY
Refills: 0 | Status: DISCONTINUED | OUTPATIENT
Start: 2019-06-10 | End: 2019-06-27

## 2019-06-10 RX ADMIN — Medication 0.5 MILLIGRAM(S): at 06:11

## 2019-06-10 RX ADMIN — MEROPENEM 100 MILLIGRAM(S): 1 INJECTION INTRAVENOUS at 14:53

## 2019-06-10 RX ADMIN — Medication 0.5 MILLIGRAM(S): at 19:05

## 2019-06-10 RX ADMIN — Medication 25 MILLIGRAM(S): at 14:51

## 2019-06-10 RX ADMIN — MEROPENEM 100 MILLIGRAM(S): 1 INJECTION INTRAVENOUS at 21:44

## 2019-06-10 RX ADMIN — Medication 25 MILLIGRAM(S): at 06:11

## 2019-06-10 RX ADMIN — BUPROPION HYDROCHLORIDE 300 MILLIGRAM(S): 150 TABLET, EXTENDED RELEASE ORAL at 12:01

## 2019-06-10 RX ADMIN — CARBIDOPA AND LEVODOPA 2 TABLET(S): 25; 100 TABLET ORAL at 14:52

## 2019-06-10 RX ADMIN — CARBIDOPA AND LEVODOPA 2 TABLET(S): 25; 100 TABLET ORAL at 06:11

## 2019-06-10 RX ADMIN — MEMANTINE HYDROCHLORIDE 5 MILLIGRAM(S): 10 TABLET ORAL at 06:12

## 2019-06-10 RX ADMIN — ENOXAPARIN SODIUM 40 MILLIGRAM(S): 100 INJECTION SUBCUTANEOUS at 21:43

## 2019-06-10 RX ADMIN — CARBIDOPA AND LEVODOPA 2 TABLET(S): 25; 100 TABLET ORAL at 19:06

## 2019-06-10 RX ADMIN — SERTRALINE 200 MILLIGRAM(S): 25 TABLET, FILM COATED ORAL at 12:01

## 2019-06-10 RX ADMIN — Medication 25 MILLIGRAM(S): at 10:04

## 2019-06-10 RX ADMIN — MEMANTINE HYDROCHLORIDE 5 MILLIGRAM(S): 10 TABLET ORAL at 19:07

## 2019-06-10 RX ADMIN — MEROPENEM 100 MILLIGRAM(S): 1 INJECTION INTRAVENOUS at 06:09

## 2019-06-10 RX ADMIN — Medication 25 MILLIGRAM(S): at 19:10

## 2019-06-10 RX ADMIN — SENNA PLUS 2 TABLET(S): 8.6 TABLET ORAL at 21:43

## 2019-06-10 RX ADMIN — Medication 1 MILLIGRAM(S): at 12:01

## 2019-06-10 RX ADMIN — Medication 100 MILLIGRAM(S): at 19:05

## 2019-06-10 RX ADMIN — CARBIDOPA AND LEVODOPA 1 TABLET(S): 25; 100 TABLET ORAL at 20:07

## 2019-06-10 RX ADMIN — DONEPEZIL HYDROCHLORIDE 10 MILLIGRAM(S): 10 TABLET, FILM COATED ORAL at 21:42

## 2019-06-10 RX ADMIN — CARBIDOPA AND LEVODOPA 2 TABLET(S): 25; 100 TABLET ORAL at 10:04

## 2019-06-10 RX ADMIN — PANTOPRAZOLE SODIUM 40 MILLIGRAM(S): 20 TABLET, DELAYED RELEASE ORAL at 06:11

## 2019-06-10 NOTE — H&P ADULT - NSHPPHYSICALEXAM_GEN_ALL_CORE
Mental Status - Patient is alert, awake, oriented to hospital, "2022" and "December". Speech is fluent, able to  name and repeat. Impaired attention and concentration.  Follows commands well and able to answer questions appropriately. Mood and affect  normal. Decreased short term memory.   Cranial Nerves - VFF, PERRL, EOMI, V1-V3 intact, no gross facial asymmetry, no dysarthria, no dysphagia, hypophonia  Motor Exam -   No focal weakness, severe rigidity with cogwheeling LEs more than UEs. No clear resting tremor. Bradykinesia.   Normal muscle bulk/tone  Sensory    Intact to light touch bilaterally.  Coord: slow due to rigidity, impaired finger tapping right more than left   Gait -  decreased postural stability with loss of postural reflexes          Toes are flexor                                GENERAL Exam:     Nontoxic , No Acute Distress 	  HEENT:  normocephalic, atraumatic		  LUNGS:	Clear bilaterally   HEART:	 Normal S1S2   No murmur RRR        GI/ ABDOMEN:  Soft  Non tender +BS  EXTREMITIES:   No Edema    SKIN:  +Ecchymosis to bilat lower extremities, no signs of skin breakdown

## 2019-06-10 NOTE — DISCHARGE NOTE PROVIDER - CARE PROVIDER_API CALL
Virgil Obrien (DO)  Internal Medicine  [address] [place] [address]  Phone: [phone]  Fax: [phone]  Follow Up Time:

## 2019-06-10 NOTE — DISCHARGE NOTE PROVIDER - NSDCCPCAREPLAN_GEN_ALL_CORE_FT
PRINCIPAL DISCHARGE DIAGNOSIS  Diagnosis: E-coli UTI  Assessment and Plan of Treatment: ESBL; continue with Meroopenem      SECONDARY DISCHARGE DIAGNOSES  Diagnosis: Convulsions, unspecified convulsion type  Assessment and Plan of Treatment: PRINCIPAL DISCHARGE DIAGNOSIS  Diagnosis: E-coli UTI  Assessment and Plan of Treatment: ESBL; started on IV Meropenem 500mg q8h for 3 more days until 6/13 (to complete treatment end of day on 13)      SECONDARY DISCHARGE DIAGNOSES  Diagnosis: Parkinsons  Assessment and Plan of Treatment: continue with meds as usual and rehabilatation

## 2019-06-10 NOTE — H&P ADULT - PROBLEM SELECTOR PLAN 2
Continue all medications  May need Psychiatry evaluation Continue all medications.  May need Psychiatry evaluation.

## 2019-06-10 NOTE — H&P ADULT - NSHPLABSRESULTS_GEN_ALL_CORE
Labs from Jun 6th:   WBC 9.43  H/H 13.3/41.7  Plts 268  Na 141  K 4.4  Chlor 104  Co2 28  BUN 17  Cr 1.15      < from: EEG Awake or Drowsy (06.07.19 @ 10:00) >     EXAM:  EEG AWAKE AND DROWSY      PROCEDURE DATE:  06/07/2019      IN  ECG:   The single channel ECG recording did not show any significant arrhythmias.      EEG Summary/Classification:  This was an abnormal EEG study in the awake and drowsy states due to the   presence of mild generalized slowing.    EEG Impression/Clinical Correlate:  The findings are suggestive of diffuse cerebral   dysfunction/encephalopathy. No epileptiform activity was seen and no   clinical events or seizures were recorded. Consider a repeat study if   clinically indicated.     ________________________________________  Marina Kumar MD  Attending Physician  Director of Epilepsy at [facility]      < end of copied text >

## 2019-06-10 NOTE — H&P ADULT - HISTORY OF PRESENT ILLNESS
This is a 73 y/o female with PMHx advanced Parkinson's disease, orthostatic hypotension and anxiety brought in by her  to [facility] on 6/6/19 for ongoing body shaking at home and worsening unsteady gait. Patient fell 8 days ago and sustained a right wrist fracture that was splinted in the ED. On 6/5/19, per outpatient Neuro recs, Midodrine was increased to 10mg in AM along with continued 5mg in evening for severe orthostasis.  notes patient experiencing body shaking with position changes, is unaware of this and becomes extremely weak afterwards. No prior history of seizures. No head trauma, tongue biting or bowel incontinence. Patient is intermittently incontinent of urine and was treated for ESBL E. coli UTI in April. SInce then has not had recurrent UTI's.     In the ED on the 6th, notable labs included normal CBC, CMP and UA with concerns for pyuria. CT head with brain atrophy, CXR clear. Patient admitted.     During hospitalization at , neurology consulted. EEG done and did not show any epileptiform activity.  Recommended to continue current regimen of Parkinson's medications - Sinemet 25/100 2 tabs 5 times per day, carbidopa 25 mg five times per day, Sinemet CR 50/200 qhs and to continue Midodrine with consideration to increase to 10 mg BID if needed.  Urine culture sent and shows >100, 000 e coli +ESBL. ID consulted and started patient on meropenem 500mg [id number]. Patient to continue meropenem until end of day on 6/13.   Patient medically stable and accepted into the Parkinson's program here at [facility] under Dr Coello's supervision on 6/10/19.

## 2019-06-10 NOTE — DISCHARGE NOTE PROVIDER - HOSPITAL COURSE
Vital Signs Last 24 Hrs    T(C): 36.1 (10 Carlos 2019 10:55), Max: 37.2 (09 Jun 2019 18:09)    T(F): 97 (10 Carlos 2019 10:55), Max: 98.9 (09 Jun 2019 18:09)    HR: 74 (10 Carlos 2019 10:55) (74 - 97)    BP: 127/82 (10 Carlos 2019 10:55) (114/69 - 142/70)    BP(mean): --    RR: 17 (10 Carlos 2019 10:55) (17 - 17)    SpO2: 95% (10 Carlos 2019 10:55) (94% - 98%)        HEENT:   pupils equal and reactive, EOMI, no oropharyngeal lesions, erythema, exudates, oral thrush        NECK:   supple, no carotid bruits, no palpable lymph nodes, no thyromegaly        CV:  +S1, +S2, regular, no murmurs or rubs        RESP:   lungs clear to auscultation bilaterally, no wheezing, rales, rhonchi, good air entry bilaterally        BREAST:  not examined        GI:  abdomen soft, non-tender, non-distended, normal BS, no bruits, no abdominal masses, no palpable masses        RECTAL:  not examined        :  not examined        MSK:   normal muscle tone, no atrophy, no rigidity, no contractions        EXT:   no clubbing, no cyanosis, no edema, no calf pain, swelling or erythema        VASCULAR:  pulses equal and symmetric in the upper and lower extremities        NEURO:  AAOX3, no focal neurological deficits, follows all commands, able to move extremities spontaneously        SKIN:  no ulcers, lesions or rashes                    Hospital course:        This 75 y/o female with PMHx advanced Parkinson's disease, orthostatic hypotension and anxiety brought in by her  for ongoing body shaking at home and worsening unsteady gait.         # fall w/ shaking, likley related to decompensation from urinary tract infection    -Right Wrist Fracture 2/2 fall    -Body Shaking- was initially suspected secondary to parkinsons or orthostasis however more likley related to infection    -patient also somewehat enceophalopathhic, now improved;    -Patient started on IV Meropenem 500mg q8h            # Parkinsons Disease - continue home meds, discussed w/ pharmacy dosing requirements.     -patient to go inpatient rehab with dedicated unit for parkinsons disese Vital Signs Last 24 Hrs    T(C): 36.1 (10 Carlos 2019 10:55), Max: 37.2 (09 Jun 2019 18:09)    T(F): 97 (10 Carlos 2019 10:55), Max: 98.9 (09 Jun 2019 18:09)    HR: 74 (10 Carlos 2019 10:55) (74 - 97)    BP: 127/82 (10 Carlos 2019 10:55) (114/69 - 142/70)    BP(mean): --    RR: 17 (10 Carlos 2019 10:55) (17 - 17)    SpO2: 95% (10 Carlos 2019 10:55) (94% - 98%)        HEENT:   pupils equal and reactive, EOMI, no oropharyngeal lesions, erythema, exudates, oral thrush        NECK:   supple, no carotid bruits, no palpable lymph nodes, no thyromegaly        CV:  +S1, +S2, regular, no murmurs or rubs        RESP:   lungs clear to auscultation bilaterally, no wheezing, rales, rhonchi, good air entry bilaterally        BREAST:  not examined        GI:  abdomen soft, non-tender, non-distended, normal BS, no bruits, no abdominal masses, no palpable masses        RECTAL:  not examined        :  not examined        MSK:   normal muscle tone, no atrophy, no rigidity, no contractions        EXT:   no clubbing, no cyanosis, no edema, no calf pain, swelling or erythema        VASCULAR:  pulses equal and symmetric in the upper and lower extremities        NEURO:  AAOX3, no focal neurological deficits, follows all commands, able to move extremities spontaneously        SKIN:  no ulcers, lesions or rashes                    Hospital course:        This 75 y/o female with PMHx advanced Parkinson's disease, orthostatic hypotension and anxiety brought in by her  for ongoing body shaking at home and worsening unsteady gait.         # fall w/ shaking, likley related to decompensation from urinary tract infection    -Right Wrist Fracture 2/2 fall    -Body Shaking- was initially suspected secondary to parkinsons or orthostasis however more likley related to infection    -patient also somewehat enceophalopathhic, now improved;    -Patient started on IV Meropenem 500mg q8h for 3 more days until 6/13 (to complete treatment end of day on 13)            # Parkinsons Disease - continue home meds, discussed w/ pharmacy dosing requirements.     -patient to go inpatient rehab with dedicated unit for parkinsons disese

## 2019-06-10 NOTE — H&P ADULT - NSHPSOCIALHISTORY_GEN_ALL_CORE
Lives with  in 1 level house w/ ramp to enter + threshold. has RW, shower chair, commode.     Nonsmoker, no drug or alcohol use, lives with . Has home health aide 8hrs /day 5 days a week.    Family hx- no hx of heart disease or cancer in 1st degree relatives Lives with  in 1 level house w/ ramp to enter + threshold. has RW, shower chair, commode. Needs assist with all ADLs.      Nonsmoker, no drug or alcohol use, lives with . Has home health aide 8hrs /day 4 to 5 days a week.        Family hx- no hx of heart disease or cancer in 1st degree relatives

## 2019-06-10 NOTE — H&P ADULT - NSHPREVIEWOFSYSTEMS_GEN_ALL_CORE
TODAY'S SUBJECTIVE & REVIEW OF SYMPTOMS:    [   ] Constitutional WNL  [   ] Cardio WNL  [   ] Resp WNL  [   ] GI WNL  [   ] Heme WNL  [   ] Endo WNL  [   ] Skin WNL  [   ] MSK WNL  [   ] Neuro WNL  [   ] Cognitive WNL  [   ] Psych WNL    Any major surgery within past 100 days?    NO    Two or more falls within past one year?      YES    One fall with injury within past one year?   YES TODAY'S SUBJECTIVE & REVIEW OF SYMPTOMS:  Patient denies CP, SOB, HAs, abdominal pain, fevers, chills.   + h/o chronic urinary incontinence. Per , has been couple of days since last BM, unclear when last BM occurred. Patient reports some dizziness with activity on occasion.   Patient reports some off and on nausea at , for which she was started on protonix per .     Any major surgery within past 100 days?    NO    Two or more falls within past one year?      YES    One fall with injury within past one year?   YES

## 2019-06-10 NOTE — H&P ADULT - ATTENDING COMMENTS
75 y/o  RH female with PMHx advanced Parkinson's disease, orthostatic hypotension and anxiety, recent fall with +right wrist fx, brought in by her  to [facility] on 6/6/19 for ongoing body shaking at home and worsening unsteady gait, found to have e coli ESBL+ UTI and placed on meropenem, being admitted to the Parkinson's program here at [facility] for acute inpatient treatment of Parkinson's disease, gait and ADL dysfunction.     Admit to BIU  Admit labs  Resume all medications  Complete IV ABX for UTI  Medicine evaluation  Fall and aspiration precautions   Orthopedic eval for WBS of the right UE with wrist fracture

## 2019-06-10 NOTE — H&P ADULT - PROBLEM SELECTOR PLAN 1
Start comprehensive inpatient acute rehab program consisiting of PT, OT and ST.   Continue carbidopa, cardi Start comprehensive inpatient acute rehab program consisting of PT, OT and ST.   Continue current PD medication regimen  Monitor orthostatic hypotension Start comprehensive inpatient acute rehab program consisting of PT, OT and ST.   Continue current PD medication regimen.  Monitor orthostatic hypotension.

## 2019-06-10 NOTE — H&P ADULT - ASSESSMENT
This is a 75 y/o female with PMHx advanced Parkinson's disease, orthostatic hypotension and anxiety, recent fall with +right wrist fx, brought in by her  to [facility] on 6/6/19 for ongoing body shaking at home and worsening unsteady gait, found to have e coli ESBL+ UTI and placed on meropenem, being admitted to the Parkinson's program here at [facility] for acute inpatient treatment of Parkinson's disease, gait and ADL dysfunction.         Precautions:     falls               Diet: DASH/TLC, regular consistency with thin liquids     DVT Prophylaxis:   lovenox, IPCs                                      Medical Prognosis: good     Prescreen Comparison: I have reviewed the prescreen information and I found no relevant changes between the preadmission  screening and my post admission evaluation.     Expected Therapy:   P.T.     1   hrs/day           O. T.   1   hrs/day           S.L.P.    1    hrs/day                    P&O   n/a                                                Expected Frequency: 5 days/7 day period    Rehab Potential:            good           Estimated Disposition:  home with outpatient therapies         ELOS: 18-21days           days      Rationale For Inpatient Rehab Admission- Patient demonstrates the following:     [X] Medically appropriate for rehabilitation admission   [X] Has attainable rehab goals with an approrpriate discharge plan  [X] Has rehabilitation potential (expected to make significant improvement within a reasonable period of time)  [X] Requires close medical management by a rehab physician, rehab nursing care and comprehensive interdisciplinary team (including         PT, OT, SLP and/or prosthetics and orthotics)

## 2019-06-11 DIAGNOSIS — I95.1 ORTHOSTATIC HYPOTENSION: ICD-10-CM

## 2019-06-11 DIAGNOSIS — Z29.9 ENCOUNTER FOR PROPHYLACTIC MEASURES, UNSPECIFIED: ICD-10-CM

## 2019-06-11 LAB
ALBUMIN SERPL ELPH-MCNC: 3.5 G/DL — SIGNIFICANT CHANGE UP (ref 3.3–5)
ALP SERPL-CCNC: 101 U/L — SIGNIFICANT CHANGE UP (ref 40–120)
ALT FLD-CCNC: 13 U/L DA — SIGNIFICANT CHANGE UP (ref 10–45)
ANION GAP SERPL CALC-SCNC: 7 MMOL/L — SIGNIFICANT CHANGE UP (ref 5–17)
AST SERPL-CCNC: 25 U/L — SIGNIFICANT CHANGE UP (ref 10–40)
BASOPHILS # BLD AUTO: 0.06 K/UL — SIGNIFICANT CHANGE UP (ref 0–0.2)
BASOPHILS NFR BLD AUTO: 0.9 % — SIGNIFICANT CHANGE UP (ref 0–2)
BILIRUB SERPL-MCNC: 0.4 MG/DL — SIGNIFICANT CHANGE UP (ref 0.2–1.2)
BUN SERPL-MCNC: 18 MG/DL — SIGNIFICANT CHANGE UP (ref 7–23)
CALCIUM SERPL-MCNC: 9.3 MG/DL — SIGNIFICANT CHANGE UP (ref 8.4–10.5)
CHLORIDE SERPL-SCNC: 104 MMOL/L — SIGNIFICANT CHANGE UP (ref 96–108)
CO2 SERPL-SCNC: 30 MMOL/L — SIGNIFICANT CHANGE UP (ref 22–31)
CREAT SERPL-MCNC: 1.05 MG/DL — SIGNIFICANT CHANGE UP (ref 0.5–1.3)
EOSINOPHIL # BLD AUTO: 0.17 K/UL — SIGNIFICANT CHANGE UP (ref 0–0.5)
EOSINOPHIL NFR BLD AUTO: 2.7 % — SIGNIFICANT CHANGE UP (ref 0–6)
GLUCOSE SERPL-MCNC: 83 MG/DL — SIGNIFICANT CHANGE UP (ref 70–99)
HCT VFR BLD CALC: 40.3 % — SIGNIFICANT CHANGE UP (ref 34.5–45)
HGB BLD-MCNC: 12.9 G/DL — SIGNIFICANT CHANGE UP (ref 11.5–15.5)
IMM GRANULOCYTES NFR BLD AUTO: 0.8 % — SIGNIFICANT CHANGE UP (ref 0–1.5)
LYMPHOCYTES # BLD AUTO: 1.69 K/UL — SIGNIFICANT CHANGE UP (ref 1–3.3)
LYMPHOCYTES # BLD AUTO: 26.4 % — SIGNIFICANT CHANGE UP (ref 13–44)
MAGNESIUM SERPL-MCNC: 2.3 MG/DL — SIGNIFICANT CHANGE UP (ref 1.6–2.6)
MCHC RBC-ENTMCNC: 29.7 PG — SIGNIFICANT CHANGE UP (ref 27–34)
MCHC RBC-ENTMCNC: 32 GM/DL — SIGNIFICANT CHANGE UP (ref 32–36)
MCV RBC AUTO: 92.6 FL — SIGNIFICANT CHANGE UP (ref 80–100)
MONOCYTES # BLD AUTO: 0.61 K/UL — SIGNIFICANT CHANGE UP (ref 0–0.9)
MONOCYTES NFR BLD AUTO: 9.5 % — SIGNIFICANT CHANGE UP (ref 2–14)
NEUTROPHILS # BLD AUTO: 3.81 K/UL — SIGNIFICANT CHANGE UP (ref 1.8–7.4)
NEUTROPHILS NFR BLD AUTO: 59.7 % — SIGNIFICANT CHANGE UP (ref 43–77)
NRBC # BLD: 0 /100 WBCS — SIGNIFICANT CHANGE UP (ref 0–0)
PHOSPHATE SERPL-MCNC: 3.7 MG/DL — SIGNIFICANT CHANGE UP (ref 2.5–4.5)
PLATELET # BLD AUTO: 234 K/UL — SIGNIFICANT CHANGE UP (ref 150–400)
POTASSIUM SERPL-MCNC: 4.3 MMOL/L — SIGNIFICANT CHANGE UP (ref 3.5–5.3)
POTASSIUM SERPL-SCNC: 4.3 MMOL/L — SIGNIFICANT CHANGE UP (ref 3.5–5.3)
PROT SERPL-MCNC: 6.6 G/DL — SIGNIFICANT CHANGE UP (ref 6–8.3)
RBC # BLD: 4.35 M/UL — SIGNIFICANT CHANGE UP (ref 3.8–5.2)
RBC # FLD: 13.8 % — SIGNIFICANT CHANGE UP (ref 10.3–14.5)
SODIUM SERPL-SCNC: 141 MMOL/L — SIGNIFICANT CHANGE UP (ref 135–145)
WBC # BLD: 6.39 K/UL — SIGNIFICANT CHANGE UP (ref 3.8–10.5)
WBC # FLD AUTO: 6.39 K/UL — SIGNIFICANT CHANGE UP (ref 3.8–10.5)

## 2019-06-11 PROCEDURE — 99232 SBSQ HOSP IP/OBS MODERATE 35: CPT

## 2019-06-11 PROCEDURE — 99222 1ST HOSP IP/OBS MODERATE 55: CPT

## 2019-06-11 RX ORDER — CARBIDOPA 25 MG
25 TABLET ORAL
Refills: 0 | Status: DISCONTINUED | OUTPATIENT
Start: 2019-06-11 | End: 2019-06-27

## 2019-06-11 RX ADMIN — Medication 100 MILLIGRAM(S): at 05:16

## 2019-06-11 RX ADMIN — PANTOPRAZOLE SODIUM 40 MILLIGRAM(S): 20 TABLET, DELAYED RELEASE ORAL at 06:47

## 2019-06-11 RX ADMIN — ENOXAPARIN SODIUM 40 MILLIGRAM(S): 100 INJECTION SUBCUTANEOUS at 21:04

## 2019-06-11 RX ADMIN — Medication 0.5 MILLIGRAM(S): at 05:16

## 2019-06-11 RX ADMIN — Medication 25 MILLIGRAM(S): at 19:01

## 2019-06-11 RX ADMIN — MEROPENEM 100 MILLIGRAM(S): 1 INJECTION INTRAVENOUS at 05:05

## 2019-06-11 RX ADMIN — SENNA PLUS 2 TABLET(S): 8.6 TABLET ORAL at 21:05

## 2019-06-11 RX ADMIN — Medication 25 MILLIGRAM(S): at 06:47

## 2019-06-11 RX ADMIN — CARBIDOPA AND LEVODOPA 2 TABLET(S): 25; 100 TABLET ORAL at 19:01

## 2019-06-11 RX ADMIN — CARBIDOPA AND LEVODOPA 1 TABLET(S): 25; 100 TABLET ORAL at 20:05

## 2019-06-11 RX ADMIN — CARBIDOPA AND LEVODOPA 2 TABLET(S): 25; 100 TABLET ORAL at 12:47

## 2019-06-11 RX ADMIN — Medication 25 MILLIGRAM(S): at 10:13

## 2019-06-11 RX ADMIN — Medication 1 MILLIGRAM(S): at 12:47

## 2019-06-11 RX ADMIN — Medication 0.5 MILLIGRAM(S): at 17:38

## 2019-06-11 RX ADMIN — CARBIDOPA AND LEVODOPA 2 TABLET(S): 25; 100 TABLET ORAL at 10:13

## 2019-06-11 RX ADMIN — MEROPENEM 100 MILLIGRAM(S): 1 INJECTION INTRAVENOUS at 21:04

## 2019-06-11 RX ADMIN — DONEPEZIL HYDROCHLORIDE 10 MILLIGRAM(S): 10 TABLET, FILM COATED ORAL at 21:04

## 2019-06-11 RX ADMIN — Medication 100 MILLIGRAM(S): at 17:38

## 2019-06-11 RX ADMIN — BUPROPION HYDROCHLORIDE 300 MILLIGRAM(S): 150 TABLET, EXTENDED RELEASE ORAL at 12:46

## 2019-06-11 RX ADMIN — MEMANTINE HYDROCHLORIDE 5 MILLIGRAM(S): 10 TABLET ORAL at 17:38

## 2019-06-11 RX ADMIN — CARBIDOPA AND LEVODOPA 2 TABLET(S): 25; 100 TABLET ORAL at 06:47

## 2019-06-11 RX ADMIN — MEROPENEM 100 MILLIGRAM(S): 1 INJECTION INTRAVENOUS at 14:34

## 2019-06-11 RX ADMIN — Medication 25 MILLIGRAM(S): at 12:47

## 2019-06-11 RX ADMIN — Medication 25 MILLIGRAM(S): at 16:13

## 2019-06-11 RX ADMIN — MEMANTINE HYDROCHLORIDE 5 MILLIGRAM(S): 10 TABLET ORAL at 05:16

## 2019-06-11 RX ADMIN — SERTRALINE 200 MILLIGRAM(S): 25 TABLET, FILM COATED ORAL at 12:46

## 2019-06-11 RX ADMIN — CARBIDOPA AND LEVODOPA 2 TABLET(S): 25; 100 TABLET ORAL at 16:00

## 2019-06-11 NOTE — CONSULT NOTE ADULT - ATTENDING COMMENTS
Maintain Fall and aspiration precautions Maintain fall, aspiration, decub, seizure precautions at all times.  Sun Sheriff MD

## 2019-06-11 NOTE — DIETITIAN INITIAL EVALUATION ADULT. - DIET TYPE
DASH/TLC (sodium and cholesterol restricted diet)/Recommend Change to Low Sodium Diet & Recommend Initiate Ensure Enlive 8oz PO Daily (Provides 350kcal & 20grams of Protein)/supplement (specify)

## 2019-06-11 NOTE — DIETITIAN INITIAL EVALUATION ADULT. - PHYSICAL APPEARANCE
Temporalis, Orbital Fat Pads & Hand (Interosseous) Wasting Observed  (Per Nutrition Focused Physical Exam)

## 2019-06-11 NOTE — CHART NOTE - NSCHARTNOTEFT_GEN_A_CORE
Upon Nutritional Assessment by the Registered Dietitian your patient was determined to meet criteria / has evidence of the following diagnosis/diagnoses:          [X]  Mild Protein Calorie Malnutrition    Findings as based on:  [X] Comprehensive Nutrition Assessment   [X] Nutrition Focused Physical Exam - Temporalis & Orbital Fat Pads Wasting/Depletion Observed    Nutrition Plan/Recommendations:    1) Ensure Enlive 8oz PO Daily (Provides 350kcal & 20grams of Protein)     PROVIDER Section:   By signing this assessment you are acknowledging and agree with the diagnosis/diagnoses assigned by the Registered Dietitian    Tonio Gale RDN

## 2019-06-11 NOTE — PROGRESS NOTE ADULT - SUBJECTIVE AND OBJECTIVE BOX
CHIEF COMPLAINT: no new complaints, adjusting well to program, tolerates thrapy well.      HISTORY OF PRESENT ILLNESS    This is a 73 y/o female with PMHx advanced Parkinson's disease, orthostatic hypotension and anxiety brought in by her  to [facility] on 6/6/19 for ongoing body shaking at home and worsening unsteady gait. Patient fell 8 days ago and sustained a right wrist fracture that was splinted in the ED. On 6/5/19, per outpatient Neuro recs, Midodrine was increased to 10mg in AM along with continued 5mg in evening for severe orthostasis.  notes patient experiencing body shaking with position changes, is unaware of this and becomes extremely weak afterwards. No prior history of seizures. No head trauma, tongue biting or bowel incontinence. Patient is intermittently incontinent of urine and was treated for ESBL E. coli UTI in April. SInce then has not had recurrent UTI's.     In the ED on the 6th, notable labs included normal CBC, CMP and UA with concerns for pyuria. CT head with brain atrophy, CXR clear. Patient admitted.     During hospitalization at , neurology consulted. EEG done and did not show any epileptiform activity.  Recommended to continue current regimen of Parkinson's medications - Sinemet 25/100 2 tabs 5 times per day, carbidopa 25 mg five times per day, Sinemet CR 50/200 qhs and to continue Midodrine with consideration to increase to 10 mg BID if needed.  Urine culture sent and shows >100, 000 e coli +ESBL. ID consulted and started patient on meropenem 500mg [id number]. Patient to continue meropenem until end of day on 6/13.   Patient medically stable and accepted into the Parkinson's program here at [facility] under Dr Coello's supervision on 6/10/19. (10 Carlos 2019 15:21)        PAST MEDICAL & SURGICAL HISTORY:  Parkinson disease  Anxiety  Detached Retina, Left: and macular hole 5/2011  S/P Cataract Surgery: 2011  Macular Hole: 2010  Retinal Tear: 2003    VITALS  Vital Signs Last 24 Hrs  T(C): 36.5 (11 Jun 2019 07:48), Max: 37 (10 Carlos 2019 18:20)  T(F): 97.7 (11 Jun 2019 07:48), Max: 98.6 (10 Carlos 2019 18:20)  HR: 77 (11 Jun 2019 07:48) (67 - 91)  BP: 145/83 (11 Jun 2019 07:48) (143/83 - 163/81)  BP(mean): --  RR: 15 (11 Jun 2019 07:48) (14 - 15)  SpO2: 96% (11 Jun 2019 07:48) (94% - 96%)      PHYSICAL EXAM  Constitutional - NAD, Comfortable  HEENT - NCAT, EOMI  Neck - Supple, No limited ROM  Chest - CTA bilaterally, No wheeze  Cardiovascular - RRR, S1S2, No murmurs  Abdomen - BS+, Soft, NTND  Extremities - No C/C/E, No calf tenderness   Skin-no rash  Neurologic Exam - no new focal dificit                         FUNCTIONAL PROGRESS  Gait - eval  ADLs - eval  Transfers -eval  Functional transfer - eval    RECENT LABS                        12.9   6.39  )-----------( 234      ( 11 Jun 2019 05:25 )             40.3     06-11    141  |  104  |  18  ----------------------------<  83  4.3   |  30  |  1.05    Ca    9.3      11 Jun 2019 05:25  Phos  3.7     06-11  Mg     2.3     06-11    TPro  6.6  /  Alb  3.5  /  TBili  0.4  /  DBili  x   /  AST  25  /  ALT  13  /  AlkPhos  101  06-11      LIVER FUNCTIONS - ( 11 Jun 2019 05:25 )  Alb: 3.5 g/dL / Pro: 6.6 g/dL / ALK PHOS: 101 U/L / ALT: 13 U/L DA / AST: 25 U/L / GGT: x                 CURRENT MEDICATIONS  MEDICATIONS  (STANDING):  buPROPion XL . 300 milliGRAM(s) Oral daily  carbidopa 25 milliGRAM(s) Oral <User Schedule>  carbidopa/levodopa  25/100 2 Tablet(s) Oral <User Schedule>  carbidopa/levodopa CR 50/200 1 Tablet(s) Oral <User Schedule>  docusate sodium 100 milliGRAM(s) Oral two times a day  donepezil 10 milliGRAM(s) Oral at bedtime  enoxaparin Injectable 40 milliGRAM(s) SubCutaneous at bedtime  folic acid 1 milliGRAM(s) Oral daily  LORazepam     Tablet 0.5 milliGRAM(s) Oral two times a day  memantine 5 milliGRAM(s) Oral two times a day  meropenem  IVPB 500 milliGRAM(s) IV Intermittent every 8 hours  midodrine 10 milliGRAM(s) Oral <User Schedule>  midodrine 5 milliGRAM(s) Oral <User Schedule>  pantoprazole    Tablet 40 milliGRAM(s) Oral before breakfast  senna 2 Tablet(s) Oral at bedtime  sertraline 200 milliGRAM(s) Oral daily    MEDICATIONS  (PRN):  acetaminophen   Tablet .. 650 milliGRAM(s) Oral every 6 hours PRN Mild Pain (1 - 3)  polyethylene glycol 3350 17 Gram(s) Oral daily PRN Constipation

## 2019-06-11 NOTE — PROGRESS NOTE ADULT - ASSESSMENT
This is a 75 y/o female with PMHx advanced Parkinson's disease, orthostatic hypotension and anxiety, recent fall with +right wrist fx, brought in by her  to [facility] on 6/6/19 for ongoing body shaking at home and worsening unsteady gait, found to have e coli ESBL+ UTI and placed on meropenem, being admitted to the Parkinson's program here at [facility] for acute inpatient treatment of Parkinson's disease, gait and ADL dysfunction.

## 2019-06-11 NOTE — DIETITIAN INITIAL EVALUATION ADULT. - NUTRITION INTERVENTION
Meals and Snack/Collaboration and Referral of Nutrition Care/Nutrition Education/Medical Food Supplements

## 2019-06-11 NOTE — CONSULT NOTE ADULT - SUBJECTIVE AND OBJECTIVE BOX
This is a 75 y/o female with PMHx advanced Parkinson's disease, orthostatic hypotension and anxiety brought in by her  to [facility] on 6/6/19 for ongoing body shaking at home and worsening unsteady gait. Patient fell 8 days ago and sustained a right wrist fracture that was splinted in the ED. Patient medically stable and accepted into the Parkinson's program here at [facility] under Dr Coello's supervision on 6/10/19.  Dr. Tam was consulted to determine weight bearing status and continue ortho follow up if needed while in rehab here at [facility].    Xrays reviewed by Dr tam. Dr tam to  see and evaluate patient.

## 2019-06-11 NOTE — CONSULT NOTE ADULT - ASSESSMENT
75 y/o female with PMHx advanced Parkinson's disease, orthostatic hypotension, and anxiety, recent fall with +right wrist fracture, brought in by her  to [facility] on 6/6/2019 for ongoing body shaking at home and worsening unsteady gait, found to have e coli ESBL+ UTI and placed on meropenem, being admitted to the Parkinson's program here at [facility] for acute inpatient treatment of Parkinson's disease, gait and ADL dysfunction.     > Parkinson's disease      Admit to BIU  Admit labs  Resume all medications  Complete IV ABX for UTI  Medicine evaluation  Fall and aspiration precautions       -Right UE with wrist fracture.   -c/w splint   - NWB per Orthopedic eval     buPROPion XL . 300 milliGRAM(s) Oral daily  carbidopa 25 milliGRAM(s) Oral <User Schedule>  carbidopa/levodopa  25/100 2 Tablet(s) Oral <User Schedule>  carbidopa/levodopa CR 50/200 1 Tablet(s) Oral <User Schedule>  docusate sodium 100 milliGRAM(s) Oral two times a day  donepezil 10 milliGRAM(s) Oral at bedtime  enoxaparin Injectable 40 milliGRAM(s) SubCutaneous at bedtime  folic acid 1 milliGRAM(s) Oral daily  LORazepam     Tablet 0.5 milliGRAM(s) Oral two times a day  memantine 5 milliGRAM(s) Oral two times a day  meropenem  IVPB 500 milliGRAM(s) IV Intermittent every 8 hours  midodrine 10 milliGRAM(s) Oral <User Schedule>  midodrine 5 milliGRAM(s) Oral <User Schedule>  pantoprazole    Tablet 40 milliGRAM(s) Oral before breakfast  senna 2 Tablet(s) Oral at bedtime  sertraline 200 milliGRAM(s) Oral daily 73 y/o female with PMHx advanced Parkinson's disease, orthostatic hypotension, and anxiety, recent fall with +right wrist fracture, brought in by her  to [facility] on 6/6/2019 for ongoing body shaking at home and worsening unsteady gait, found to have e coli ESBL+ UTI and placed on meropenem, being admitted to the Parkinson's program here at [facility] for acute inpatient treatment of Parkinson's disease, gait and ADL dysfunction.     > Parkinson's disease  - c/w carbidopa/levodopa per neurology  - comprehensive PT/OT/ rehab/ SLP program, Parkinson's program    >E. coli ESBL+ UTI  - c/w IV meropenem  IVPB 500 milliGRAM(s) IV Intermittent every 8 hours per ID    > Right UE with wrist fracture  - c/w splint, pain control  - NWB per Orthopedic eval     > Orthostatic hypotension  - c/w midodrine     > Anxiety  - c/w LORazepam     Tablet 0.5 milliGRAM(s) Oral two times a day, buPROPion XL . 300 milliGRAM(s) Oral daily, sertraline 200 milliGRAM(s) Oral daily  - f/u psychiatry    > Constipation   - c/w docusate sodium 100 milliGRAM(s) Oral two times a day, senna 2 Tablet(s) Oral at bedtime    > Dementia   - c/w donepezil 10 milliGRAM(s) Oral at bedtime, memantine 5 milliGRAM(s) Oral two times a day, folic acid 1 milliGRAM(s) Oral daily  - frequent re-orientation    > GERD  - c/w pantoprazole    Tablet 40 milliGRAM(s) Oral before breakfast    > DVT ppx:  - enoxaparin Injectable 40 milliGRAM(s) SubCutaneous at bedtime

## 2019-06-11 NOTE — PROGRESS NOTE ADULT - PROBLEM SELECTOR PLAN 1
Start comprehensive inpatient acute rehab program consisting of PT, OT and ST.   Continue current PD medication regimen.  Monitor orthostatic hypotension.  Fall precautions

## 2019-06-11 NOTE — CONSULT NOTE ADULT - SUBJECTIVE AND OBJECTIVE BOX
Patient is a 74 year old female who presents with a chief complaint of Parkinson's disease    HPI: 74 year old female with PMHx advanced Parkinson's disease, orthostatic hypotension, and anxiety brought in by her  to [facility] on 6/6/2019 for ongoing body shaking at home and worsening unsteady gait. Patient fell 8 days ago and sustained a right wrist fracture that was splinted in the ED. On 6/5/2019, per outpatient Neuro recs, Midodrine was increased to 10mg in AM along with continued 5mg in evening for severe orthostasis.  notes patient experiencing body shaking with position changes, is unaware of this and becomes extremely weak afterwards. No prior history of seizures. No head trauma, tongue biting or bowel incontinence. Patient is intermittently incontinent of urine and was treated for ESBL E. coli UTI in April. Since then has not had recurrent UTI's.     In the ED on 6/6/2019, notable labs included normal CBC, CMP, and UA with concerns for pyuria. CT head with brain atrophy, CXR clear. Patient admitted.   During hospitalization at , neurology consulted. EEG done and did not show any epileptiform activity.  Recommended to continue current regimen of Parkinson's medications - Sinemet 25/100 2 tabs 5 times per day, carbidopa 25 mg five times per day, Sinemet CR 50/200 qhs and to continue Midodrine with consideration to increase to 10 mg BID if needed.  Urine culture sent and shows >100, 000 e coli +ESBL. ID consulted and started patient on meropenem 500mg [id number]. Patient to continue meropenem until end of day on 6/13/2019.  Patient medically stable and accepted into the Parkinson's program here at [facility] under Dr Coello's supervision on 6/10/2019.    PAST MEDICAL HISTORY:  Anxiety   Parkinson disease.     PAST SURGICAL HISTORY:  Detached Retina, Left and macular hole 5/2011  Macular Hole 2010  Retinal Tear 2003  S/P Cataract Surgery 2011.    FAMILY HISTORY: No pertient family history reported  No history of heart disease or cancer in 1st degree relatives    SOCIAL HISTORY:  Lives with  in 1 level house with ramp to enter + threshold. has RW, shower chair, commode. Needs assist with all ADLs.   Nonsmoker, no drug or alcohol use, lives with . Has home health aide 8hrs /day 4 to 5 days a week.  	  	    ALLERGIES: No Known Allergies    MEDICATIONS  (STANDING):  buPROPion XL . 300 milliGRAM(s) Oral daily  carbidopa 25 milliGRAM(s) Oral <User Schedule>  carbidopa/levodopa  25/100 2 Tablet(s) Oral <User Schedule>  carbidopa/levodopa CR 50/200 1 Tablet(s) Oral <User Schedule>  docusate sodium 100 milliGRAM(s) Oral two times a day  donepezil 10 milliGRAM(s) Oral at bedtime  enoxaparin Injectable 40 milliGRAM(s) SubCutaneous at bedtime  folic acid 1 milliGRAM(s) Oral daily  LORazepam     Tablet 0.5 milliGRAM(s) Oral two times a day  memantine 5 milliGRAM(s) Oral two times a day  meropenem  IVPB 500 milliGRAM(s) IV Intermittent every 8 hours  midodrine 10 milliGRAM(s) Oral <User Schedule>  midodrine 5 milliGRAM(s) Oral <User Schedule>  pantoprazole    Tablet 40 milliGRAM(s) Oral before breakfast  senna 2 Tablet(s) Oral at bedtime  sertraline 200 milliGRAM(s) Oral daily    MEDICATIONS  (PRN):  acetaminophen   Tablet .. 650 milliGRAM(s) Oral every 6 hours PRN Mild Pain (1 - 3)  polyethylene glycol 3350 17 Gram(s) Oral daily PRN Constipation      REVIEW OF SYSTEMS:  CONSTITUTIONAL: No fever, weight loss, or fatigue  EYES: No eye pain, visual disturbances, or discharge  ENMT:  No difficulty hearing, tinnitus, vertigo; No sinus or throat pain  NECK: No pain or stiffness  RESPIRATORY: No cough, wheezing, chills or hemoptysis; No shortness of breath  CARDIOVASCULAR: No chest pain, palpitations, dizziness, or leg swelling  GASTROINTESTINAL: No abdominal or epigastric pain. No nausea, vomiting, or hematemesis; No diarrhea or constipation. No melena or hematochezia.  GENITOURINARY: No dysuria, frequency, hematuria, or incontinence  NEUROLOGICAL: No headaches, memory loss, loss of strength, numbness, or tremors  SKIN: No itching, burning, rashes, or lesions   LYMPH NODES: No enlarged glands  ENDOCRINE: No heat or cold intolerance; No hair loss  MUSCULOSKELETAL: No joint pain or swelling; No muscle, back, or extremity pain  PSYCHIATRIC: No depression, anxiety, mood swings, or difficulty sleeping  HEME/LYMPH: No easy bruising, or bleeding gums  ALLERY AND IMMUNOLOGIC: No hives or eczema    PHYSICAL EXAM:    T(C): 37 (06-10-19 @ 21:30), Max: 37 (06-10-19 @ 18:20)  HR: 67 (06-11-19 @ 05:07) (67 - 91)  BP: 159/81 (06-11-19 @ 05:07) (127/82 - 163/81)  RR: 14 (06-11-19 @ 05:07) (14 - 17)  SpO2: 94% (06-11-19 @ 05:07) (94% - 95%)  Wt(kg): --  I&O's Summary    10 Carlos 2019 07:01  -  11 Jun 2019 07:00  --------------------------------------------------------  IN: 50 mL / OUT: 0 mL / NET: 50 mL        GENERAL: NAD, well-groomed, well-developed  HEAD:  Atraumatic, Normocephalic  EYES: EOMI, PERRL, conjunctiva and sclera clear  ENMT: No tonsillar erythema, exudates, or enlargement; Moist mucous membranes, Good dentition, No lesions  NECK: Supple, No JVD, Normal thyroid  NERVOUS SYSTEM:  Alert & Oriented X3, Good concentration; Motor Strength 5/5 B/L upper and lower extremities; DTRs 2+ intact and symmetric  CHEST/LUNG: Clear to ascultation bilaterally; No rales, rhonchi, wheezing, or rubs  HEART: Regular rate and rhythm; No murmurs, rubs, or gallops  ABDOMEN: Soft, Nontender, Nondistended; Bowel sounds present  EXTREMITIES:  2+ Peripheral Pulses, No clubbing, cyanosis, or edema  LYMPH: No lymphadenopathy noted  SKIN: No rashes or lesions    LABS:                        12.9   6.39  )-----------( 234      ( 11 Jun 2019 05:25 )             40.3       Culture - Urine (collected 06-06-19 @ 11:24)  Source: .Urine Clean Catch (Midstream)  Final Report (06-08-19 @ 12:55):    >100,000 CFU/ml Escherichia coli ESBL  Organism: Escherichia coli ESBL (06-08-19 @ 12:55)  Organism: Escherichia coli ESBL (06-08-19 @ 12:55)      -  Amikacin: S <=16      -  Ampicillin: R >16 These ampicillin results predict results for amoxicillin      -  Ampicillin/Sulbactam: R 16/8      -  Aztreonam: R >16      -  Cefazolin: R >16 For uncomplicated UTI with K. pneumoniae, E. coli, or P. mirablis: JOYCE <=16 is sensitive and JOYCE >=32 is resistant. This also predicts results for oral agents cefaclor, cefdinir, cefpodoxime, cefprozil, cefuroxime axetil, cephalexin and locarbef for uncomplicated UTI. Note that some isolates may be susceptible to these agents while testing resistant to cefazolin.      -  Cefepime: R >16      -  Cefoxitin: I 16      -  Ceftriaxone: R >32 Enterobacter, Citrobacter, and Serratia may develop resistance during prolonged therapy      -  Ciprofloxacin: R >2      -  Ertapenem: S <=1      -  Gentamicin: R >8      -  Imipenem: S <=1      -  Levofloxacin: R >4      -  Meropenem: S <=1      -  Nitrofurantoin: S <=32 Should not be used to treat pyelonephritis      -  Piperacillin/Tazobactam: R <=16      -  Tigecycline: S <=2      -  Tobramycin: R >8      -  Trimethoprim/Sulfamethoxazole: R >2/38      Method Type: JOYCE        RADIOLOGY & ADDITIONAL TESTS:    Consultant(s) Notes Reviewed:  [x] YES  [ ] NO    Care Discussed with Consultants/Other Providers [x] YES  [ ] NO Patient is a 74 year old female who presents with a chief complaint of Parkinson's disease    HPI: 74 year old female with PMHx advanced Parkinson's disease, orthostatic hypotension, and anxiety brought in by her  to [facility] on 6/6/2019 for ongoing body shaking at home and worsening unsteady gait. Patient fell 8 days ago and sustained a right wrist fracture that was splinted in the ED. On 6/5/2019, per outpatient Neuro recs, Midodrine was increased to 10mg in AM along with continued 5mg in evening for severe orthostasis.  notes patient experiencing body shaking with position changes, is unaware of this and becomes extremely weak afterwards. No prior history of seizures. No head trauma, tongue biting or bowel incontinence. Patient is intermittently incontinent of urine and was treated for ESBL E. coli UTI in April. Since then has not had recurrent UTI's.     In the ED on 6/6/2019, notable labs included normal CBC, CMP, and UA with concerns for pyuria. CT head with brain atrophy, CXR clear. Patient admitted.   During hospitalization at , neurology consulted. EEG done and did not show any epileptiform activity.  Recommended to continue current regimen of Parkinson's medications - Sinemet 25/100 2 tabs 5 times per day, carbidopa 25 mg five times per day, Sinemet CR 50/200 qhs and to continue Midodrine with consideration to increase to 10 mg BID if needed.  Urine culture sent and shows >100, 000 e coli +ESBL. ID consulted and started patient on meropenem 500mg [id number]. Patient to continue meropenem until end of day on 6/13/2019.  Patient medically stable and accepted into the Parkinson's program here at [facility] under Dr Coello's supervision on 6/10/2019.    PAST MEDICAL HISTORY:  Anxiety   Parkinson disease.     PAST SURGICAL HISTORY:  Detached Retina, Left and macular hole 5/2011  Macular Hole 2010  Retinal Tear 2003  S/P Cataract Surgery 2011.    FAMILY HISTORY:   No pertinent family history reported  No history of heart disease or cancer in 1st degree relatives    SOCIAL HISTORY:  Lives with  in 1 level house with ramp to enter + threshold. has RW, shower chair, commode. Needs assist with all ADLs.   Nonsmoker, no drug or alcohol use, lives with . Has home health aide 8hrs /day 4 to 5 days a week.  	  	    ALLERGIES: No Known Allergies    MEDICATIONS  (STANDING):  buPROPion XL . 300 milliGRAM(s) Oral daily  carbidopa 25 milliGRAM(s) Oral <User Schedule>  carbidopa/levodopa  25/100 2 Tablet(s) Oral <User Schedule>  carbidopa/levodopa CR 50/200 1 Tablet(s) Oral <User Schedule>  docusate sodium 100 milliGRAM(s) Oral two times a day  donepezil 10 milliGRAM(s) Oral at bedtime  enoxaparin Injectable 40 milliGRAM(s) SubCutaneous at bedtime  folic acid 1 milliGRAM(s) Oral daily  LORazepam     Tablet 0.5 milliGRAM(s) Oral two times a day  memantine 5 milliGRAM(s) Oral two times a day  meropenem  IVPB 500 milliGRAM(s) IV Intermittent every 8 hours  midodrine 10 milliGRAM(s) Oral <User Schedule>  midodrine 5 milliGRAM(s) Oral <User Schedule>  pantoprazole    Tablet 40 milliGRAM(s) Oral before breakfast  senna 2 Tablet(s) Oral at bedtime  sertraline 200 milliGRAM(s) Oral daily    MEDICATIONS  (PRN):  acetaminophen   Tablet .. 650 milliGRAM(s) Oral every 6 hours PRN Mild Pain (1 - 3)  polyethylene glycol 3350 17 Gram(s) Oral daily PRN Constipation      REVIEW OF SYSTEMS:  CONSTITUTIONAL: No fever, weight loss, or fatigue  EYES: No eye pain, visual disturbances, or discharge  ENMT:  No difficulty hearing, tinnitus, vertigo; No sinus or throat pain  NECK: No pain or stiffness  RESPIRATORY: No cough, wheezing, chills or hemoptysis; No shortness of breath  CARDIOVASCULAR: No chest pain, palpitations, dizziness, or leg swelling  GASTROINTESTINAL: No abdominal or epigastric pain. No nausea, vomiting, or hematemesis; No diarrhea or constipation. No melena or hematochezia.  GENITOURINARY: No dysuria, frequency, hematuria, or incontinence  NEUROLOGICAL: No headaches, memory loss, loss of strength, numbness, or tremors  SKIN: No itching, burning, rashes, or lesions   LYMPH NODES: No enlarged glands  ENDOCRINE: No heat or cold intolerance; No hair loss  MUSCULOSKELETAL: No joint pain or swelling; No muscle, back, or extremity pain  PSYCHIATRIC: No depression, anxiety, mood swings, or difficulty sleeping  HEME/LYMPH: No easy bruising, or bleeding gums  ALLERY AND IMMUNOLOGIC: No hives or eczema    PHYSICAL EXAM:    T(C): 37 (06-10-19 @ 21:30), Max: 37 (06-10-19 @ 18:20)  HR: 67 (06-11-19 @ 05:07) (67 - 91)  BP: 159/81 (06-11-19 @ 05:07) (127/82 - 163/81)  RR: 14 (06-11-19 @ 05:07) (14 - 17)  SpO2: 94% (06-11-19 @ 05:07) (94% - 95%)  Wt(kg): --  I&O's Summary    10 Carlos 2019 07:01  -  11 Jun 2019 07:00  --------------------------------------------------------  IN: 50 mL / OUT: 0 mL / NET: 50 mL        GENERAL: NAD, well-groomed, well-developed  HEAD:  Atraumatic, Normocephalic  EYES: EOMI, PERRL, conjunctiva and sclera clear  ENMT: No tonsillar erythema, exudates, or enlargement; Moist mucous membranes, Good dentition, No lesions  NECK: Supple, No JVD, Normal thyroid  NERVOUS SYSTEM:  Alert & Oriented X3, Good concentration; Motor Strength 5/5 B/L upper and lower extremities; DTRs 2+ intact and symmetric  CHEST/LUNG: Clear to ascultation bilaterally; No rales, rhonchi, wheezing, or rubs  HEART: Regular rate and rhythm; No murmurs, rubs, or gallops  ABDOMEN: Soft, Nontender, Nondistended; Bowel sounds present  EXTREMITIES:  2+ Peripheral Pulses, No clubbing, cyanosis, or edema  LYMPH: No lymphadenopathy noted  SKIN: No rashes or lesions    LABS:                        12.9   6.39  )-----------( 234      ( 11 Jun 2019 05:25 )             40.3       Culture - Urine (collected 06-06-19 @ 11:24)  Source: .Urine Clean Catch (Midstream)  Final Report (06-08-19 @ 12:55):    >100,000 CFU/ml Escherichia coli ESBL  Organism: Escherichia coli ESBL (06-08-19 @ 12:55)  Organism: Escherichia coli ESBL (06-08-19 @ 12:55)      -  Amikacin: S <=16      -  Ampicillin: R >16 These ampicillin results predict results for amoxicillin      -  Ampicillin/Sulbactam: R 16/8      -  Aztreonam: R >16      -  Cefazolin: R >16 For uncomplicated UTI with K. pneumoniae, E. coli, or P. mirablis: JOYCE <=16 is sensitive and JOYCE >=32 is resistant. This also predicts results for oral agents cefaclor, cefdinir, cefpodoxime, cefprozil, cefuroxime axetil, cephalexin and locarbef for uncomplicated UTI. Note that some isolates may be susceptible to these agents while testing resistant to cefazolin.      -  Cefepime: R >16      -  Cefoxitin: I 16      -  Ceftriaxone: R >32 Enterobacter, Citrobacter, and Serratia may develop resistance during prolonged therapy      -  Ciprofloxacin: R >2      -  Ertapenem: S <=1      -  Gentamicin: R >8      -  Imipenem: S <=1      -  Levofloxacin: R >4      -  Meropenem: S <=1      -  Nitrofurantoin: S <=32 Should not be used to treat pyelonephritis      -  Piperacillin/Tazobactam: R <=16      -  Tigecycline: S <=2      -  Tobramycin: R >8      -  Trimethoprim/Sulfamethoxazole: R >2/38      Method Type: JOYCE        RADIOLOGY & ADDITIONAL TESTS:    Consultant(s) Notes Reviewed:  [x] YES  [ ] NO    Care Discussed with Consultants/Other Providers [x] YES  [ ] NO Patient is a 74 year old female who presents with a chief complaint of Parkinson's disease    HPI: 74 year old female with PMHx advanced Parkinson's disease, orthostatic hypotension, and anxiety brought in by her  to [facility] on 6/6/2019 for ongoing body shaking at home and worsening unsteady gait. Patient fell 8 days ago and sustained a right wrist fracture that was splinted in the ED. On 6/5/2019, per outpatient Neuro recs, Midodrine was increased to 10mg in AM along with continued 5mg in evening for severe orthostasis.  notes patient experiencing body shaking with position changes, is unaware of this and becomes extremely weak afterwards. No prior history of seizures. No head trauma, tongue biting or bowel incontinence. Patient is intermittently incontinent of urine and was treated for ESBL E. coli UTI in April. Since then has not had recurrent UTI's.     In the ED on 6/6/2019, notable labs included normal CBC, CMP, and UA with concerns for pyuria. CT head with brain atrophy, CXR clear. Patient admitted.   During hospitalization at , neurology consulted. EEG done and did not show any epileptiform activity.  Recommended to continue current regimen of Parkinson's medications - Sinemet 25/100 2 tabs 5 times per day, carbidopa 25 mg five times per day, Sinemet CR 50/200 qhs and to continue Midodrine with consideration to increase to 10 mg BID if needed.  Urine culture sent and shows >100, 000 e coli +ESBL. ID consulted and started patient on meropenem 500mg [id number]. Patient to continue meropenem until end of day on 6/13/2019.  Patient medically stable and accepted into the Parkinson's program here at [facility] under Dr Coello's supervision on 6/10/2019.    PAST MEDICAL HISTORY:  Anxiety   Parkinson disease.     PAST SURGICAL HISTORY:  Detached Retina, Left and macular hole 5/2011  Macular Hole 2010  Retinal Tear 2003  S/P Cataract Surgery 2011.    FAMILY HISTORY:   No pertinent family history reported  No history of heart disease or cancer in 1st degree relatives    SOCIAL HISTORY:  Lives with  in 1 level house with ramp to enter + threshold. has RW, shower chair, commode. Needs assist with all ADLs.   Nonsmoker, no drug or alcohol use, lives with . Has home health aide 8hrs /day 4 to 5 days a week.  	  	    ALLERGIES: No Known Allergies    MEDICATIONS  (STANDING):  buPROPion XL . 300 milliGRAM(s) Oral daily  carbidopa 25 milliGRAM(s) Oral <User Schedule>  carbidopa/levodopa  25/100 2 Tablet(s) Oral <User Schedule>  carbidopa/levodopa CR 50/200 1 Tablet(s) Oral <User Schedule>  docusate sodium 100 milliGRAM(s) Oral two times a day  donepezil 10 milliGRAM(s) Oral at bedtime  enoxaparin Injectable 40 milliGRAM(s) SubCutaneous at bedtime  folic acid 1 milliGRAM(s) Oral daily  LORazepam     Tablet 0.5 milliGRAM(s) Oral two times a day  memantine 5 milliGRAM(s) Oral two times a day  meropenem  IVPB 500 milliGRAM(s) IV Intermittent every 8 hours  midodrine 10 milliGRAM(s) Oral <User Schedule>  midodrine 5 milliGRAM(s) Oral <User Schedule>  pantoprazole    Tablet 40 milliGRAM(s) Oral before breakfast  senna 2 Tablet(s) Oral at bedtime  sertraline 200 milliGRAM(s) Oral daily    MEDICATIONS  (PRN):  acetaminophen   Tablet .. 650 milliGRAM(s) Oral every 6 hours PRN Mild Pain (1 - 3)  polyethylene glycol 3350 17 Gram(s) Oral daily PRN Constipation      REVIEW OF SYSTEMS:  CONSTITUTIONAL: No fever, weight loss, has fatigue  EYES: No eye pain, visual disturbances, or discharge  ENMT:  No difficulty hearing, tinnitus, vertigo; No sinus or throat pain  NECK: No pain or stiffness  RESPIRATORY: No cough, wheezing, chills or hemoptysis; No shortness of breath  CARDIOVASCULAR: No chest pain, palpitations, dizziness, or leg swelling  GASTROINTESTINAL: No abdominal or epigastric pain. No nausea, vomiting, or hematemesis; No diarrhea or constipation. No melena or hematochezia.  GENITOURINARY:  h/o chronic urinary incontinence. No dysuria, frequency, hematuria  NEUROLOGICAL: No headaches, memory loss, loss of strength, numbness, or tremors  SKIN: No itching, burning, rashes, or lesions   ENDOCRINE: No heat or cold intolerance; No hair loss  MUSCULOSKELETAL: intermittent right wrist pain, No other joint pain or swelling; No muscle, back, or extremity pain  PSYCHIATRIC: No depression, anxiety, mood swings, or difficulty sleeping  HEME/LYMPH: No easy bruising, or bleeding gums  ALLERGY AND IMMUNOLOGIC: No hives or eczema      PHYSICAL EXAM:  T(C): 37 (06-10-19 @ 21:30), Max: 37 (06-10-19 @ 18:20)  HR: 67 (06-11-19 @ 05:07) (67 - 91)  BP: 159/81 (06-11-19 @ 05:07) (127/82 - 163/81)  RR: 14 (06-11-19 @ 05:07) (14 - 17)  SpO2: 94% (06-11-19 @ 05:07) (94% - 95%)  I&O's Summary    10 Carlos 2019 07:01  -  11 Jun 2019 07:00  --------------------------------------------------------  IN: 50 mL / OUT: 0 mL / NET: 50 mL        GENERAL: NAD, well-groomed, well-developed  HEAD:  Atraumatic, Normocephalic  EYES: EOMI, PERRL, conjunctiva and sclera clear  ENMT: No tonsillar erythema, exudates, or enlargement; Moist mucous membranes, Good dentition, No lesions  NECK: Supple, No JVD, Normal thyroid  NERVOUS SYSTEM:  Alert & Oriented X3, Good concentration; Motor Strength 5/5 B/L upper and lower extremities; DTRs 2+ intact and symmetric  CHEST/LUNG: Clear to ascultation bilaterally; No rales, rhonchi, wheezing, or rubs  HEART: Regular rate and rhythm; No murmurs, rubs, or gallops  ABDOMEN: Soft, Nontender, Nondistended; Bowel sounds present  EXTREMITIES:  2+ Peripheral Pulses, No clubbing, cyanosis, or edema  LYMPH: No lymphadenopathy noted  SKIN: No rashes or lesions    LABS:                        12.9   6.39  )-----------( 234      ( 11 Jun 2019 05:25 )             40.3       Culture - Urine (collected 06-06-19 @ 11:24)  Source: .Urine Clean Catch (Midstream)  Final Report (06-08-19 @ 12:55):    >100,000 CFU/ml Escherichia coli ESBL  Organism: Escherichia coli ESBL (06-08-19 @ 12:55)  Organism: Escherichia coli ESBL (06-08-19 @ 12:55)      -  Amikacin: S <=16      -  Ampicillin: R >16 These ampicillin results predict results for amoxicillin      -  Ampicillin/Sulbactam: R 16/8      -  Aztreonam: R >16      -  Cefazolin: R >16 For uncomplicated UTI with K. pneumoniae, E. coli, or P. mirablis: JOYCE <=16 is sensitive and JOYCE >=32 is resistant. This also predicts results for oral agents cefaclor, cefdinir, cefpodoxime, cefprozil, cefuroxime axetil, cephalexin and locarbef for uncomplicated UTI. Note that some isolates may be susceptible to these agents while testing resistant to cefazolin.      -  Cefepime: R >16      -  Cefoxitin: I 16      -  Ceftriaxone: R >32 Enterobacter, Citrobacter, and Serratia may develop resistance during prolonged therapy      -  Ciprofloxacin: R >2      -  Ertapenem: S <=1      -  Gentamicin: R >8      -  Imipenem: S <=1      -  Levofloxacin: R >4      -  Meropenem: S <=1      -  Nitrofurantoin: S <=32 Should not be used to treat pyelonephritis      -  Piperacillin/Tazobactam: R <=16      -  Tigecycline: S <=2      -  Tobramycin: R >8      -  Trimethoprim/Sulfamethoxazole: R >2/38      Method Type: JOYCE        RADIOLOGY & ADDITIONAL TESTS:    Consultant(s) Notes Reviewed:  [x] YES  [ ] NO    Care Discussed with Consultants/Other Providers [x] YES  [ ] NO Patient is a 74 year old female who presents with a chief complaint of Parkinson's disease    HPI: 74 year old female with PMHx advanced Parkinson's disease, orthostatic hypotension, and anxiety brought in by her  to [facility] on 6/6/2019 for ongoing body shaking at home and worsening unsteady gait. Patient fell 8 days ago and sustained a right wrist fracture that was splinted in the ED. On 6/5/2019, per outpatient Neuro recs, Midodrine was increased to 10mg in AM along with continued 5mg in evening for severe orthostasis.  notes patient experiencing body shaking with position changes, is unaware of this and becomes extremely weak afterwards. No prior history of seizures. No head trauma, tongue biting or bowel incontinence. Patient is intermittently incontinent of urine and was treated for ESBL E. coli UTI in April. Since then has not had recurrent UTI's.     In the ED on 6/6/2019, notable labs included normal CBC, CMP, and UA with concerns for pyuria. CT head with brain atrophy, CXR clear. Patient admitted.   During hospitalization at , neurology consulted. EEG done and did not show any epileptiform activity.  Recommended to continue current regimen of Parkinson's medications - Sinemet 25/100 2 tabs 5 times per day, carbidopa 25 mg five times per day, Sinemet CR 50/200 qhs and to continue Midodrine with consideration to increase to 10 mg BID if needed.  Urine culture sent and shows >100, 000 e coli +ESBL. ID consulted and started patient on meropenem 500mg [id number]. Patient to continue meropenem until end of day on 6/13/2019.  Patient medically stable and accepted into the Parkinson's program here at [facility] under Dr Coello's supervision on 6/10/2019.    PAST MEDICAL HISTORY:  Anxiety   Parkinson disease.     PAST SURGICAL HISTORY:  Detached Retina, Left and macular hole 5/2011  Macular Hole 2010  Retinal Tear 2003  S/P Cataract Surgery 2011.    FAMILY HISTORY:   No pertinent family history reported  No history of heart disease or cancer in 1st degree relatives    SOCIAL HISTORY:  Lives with  in 1 level house with ramp to enter + threshold. has RW, shower chair, commode. Needs assist with all ADLs.   Nonsmoker, no drug or alcohol use, lives with . Has home health aide 8hrs /day 4 to 5 days a week.  	  	    ALLERGIES: No Known Allergies    MEDICATIONS  (STANDING):  buPROPion XL . 300 milliGRAM(s) Oral daily  carbidopa 25 milliGRAM(s) Oral <User Schedule>  carbidopa/levodopa  25/100 2 Tablet(s) Oral <User Schedule>  carbidopa/levodopa CR 50/200 1 Tablet(s) Oral <User Schedule>  docusate sodium 100 milliGRAM(s) Oral two times a day  donepezil 10 milliGRAM(s) Oral at bedtime  enoxaparin Injectable 40 milliGRAM(s) SubCutaneous at bedtime  folic acid 1 milliGRAM(s) Oral daily  LORazepam     Tablet 0.5 milliGRAM(s) Oral two times a day  memantine 5 milliGRAM(s) Oral two times a day  meropenem  IVPB 500 milliGRAM(s) IV Intermittent every 8 hours  midodrine 10 milliGRAM(s) Oral <User Schedule>  midodrine 5 milliGRAM(s) Oral <User Schedule>  pantoprazole    Tablet 40 milliGRAM(s) Oral before breakfast  senna 2 Tablet(s) Oral at bedtime  sertraline 200 milliGRAM(s) Oral daily    MEDICATIONS  (PRN):  acetaminophen   Tablet .. 650 milliGRAM(s) Oral every 6 hours PRN Mild Pain (1 - 3)  polyethylene glycol 3350 17 Gram(s) Oral daily PRN Constipation      REVIEW OF SYSTEMS:  CONSTITUTIONAL: No fever, weight loss, has fatigue  EYES: No eye pain, visual disturbances, or discharge  ENMT:  No difficulty hearing, tinnitus, vertigo; No sinus or throat pain  NECK: No pain or stiffness  RESPIRATORY: No cough, wheezing, chills or hemoptysis; No shortness of breath  CARDIOVASCULAR: No chest pain, palpitations, dizziness, or leg swelling  GASTROINTESTINAL: No abdominal or epigastric pain. No nausea, vomiting, or hematemesis; No diarrhea or constipation. No melena or hematochezia.  GENITOURINARY:  h/o chronic urinary incontinence. No dysuria, frequency, hematuria  NEUROLOGICAL: No headaches, memory loss, loss of strength, numbness, or tremors  SKIN: No itching, burning, rashes, or lesions   ENDOCRINE: No heat or cold intolerance; No hair loss  MUSCULOSKELETAL: intermittent right wrist pain, No other joint pain or swelling; No muscle, back, or extremity pain  PSYCHIATRIC: No depression, anxiety, mood swings, or difficulty sleeping  HEME/LYMPH: No easy bruising, or bleeding gums  ALLERGY AND IMMUNOLOGIC: No hives or eczema      PHYSICAL EXAM:  T(C): 37 (06-10-19 @ 21:30), Max: 37 (06-10-19 @ 18:20)  HR: 67 (06-11-19 @ 05:07) (67 - 91)  BP: 159/81 (06-11-19 @ 05:07) (127/82 - 163/81)  RR: 14 (06-11-19 @ 05:07) (14 - 17)  SpO2: 94% (06-11-19 @ 05:07) (94% - 95%)  I&O's Summary    10 Carlos 2019 07:01  -  11 Jun 2019 07:00  --------------------------------------------------------  IN: 50 mL / OUT: 0 mL / NET: 50 mL        GENERAL: NAD, well-groomed, well-developed  HEAD:  Atraumatic, Normocephalic  EYES: EOMI, PERRL, conjunctiva and sclera clear  ENMT: Moist mucous membranes  NECK: Supple, No JVD, Normal thyroid  NERVOUS SYSTEM:  Alert & Oriented X1,  Speech is fluent, able to  name and repeat. Impaired attention and concentration.  Follows commands well and able to answer questions appropriately. Mood and affect  normal. Decreased short term memory. No focal weakness, severe rigidity with cogwheeling LEs more than UEs. No clear resting tremor. Bradykinesia.  Slow due to rigidity, impaired finger tapping right more than left, decreased postural stability with loss of postural reflexes   CHEST/LUNG: Clear to ascultation bilaterally; No rales, rhonchi, wheezing, or rubs  HEART: Regular rate and rhythm; No murmurs, rubs, or gallops  ABDOMEN: Soft, Nontender, Nondistended; Bowel sounds present  EXTREMITIES:  2+ Peripheral Pulses, No clubbing, cyanosis, or edema  SKIN: No rash, ecchymosis to bilat lower extremities,        LABS:                        12.9   6.39  )-----------( 234      ( 11 Jun 2019 05:25 )             40.3       Culture - Urine (collected 06-06-19 @ 11:24)  Source: .Urine Clean Catch (Midstream)  Final Report (06-08-19 @ 12:55):    >100,000 CFU/ml Escherichia coli ESBL  Organism: Escherichia coli ESBL (06-08-19 @ 12:55)  Organism: Escherichia coli ESBL (06-08-19 @ 12:55)      -  Amikacin: S <=16      -  Ampicillin: R >16 These ampicillin results predict results for amoxicillin      -  Ampicillin/Sulbactam: R 16/8      -  Aztreonam: R >16      -  Cefazolin: R >16 For uncomplicated UTI with K. pneumoniae, E. coli, or P. mirablis: JOYCE <=16 is sensitive and JOYCE >=32 is resistant. This also predicts results for oral agents cefaclor, cefdinir, cefpodoxime, cefprozil, cefuroxime axetil, cephalexin and locarbef for uncomplicated UTI. Note that some isolates may be susceptible to these agents while testing resistant to cefazolin.      -  Cefepime: R >16      -  Cefoxitin: I 16      -  Ceftriaxone: R >32 Enterobacter, Citrobacter, and Serratia may develop resistance during prolonged therapy      -  Ciprofloxacin: R >2      -  Ertapenem: S <=1      -  Gentamicin: R >8      -  Imipenem: S <=1      -  Levofloxacin: R >4      -  Meropenem: S <=1      -  Nitrofurantoin: S <=32 Should not be used to treat pyelonephritis      -  Piperacillin/Tazobactam: R <=16      -  Tigecycline: S <=2      -  Tobramycin: R >8      -  Trimethoprim/Sulfamethoxazole: R >2/38      Method Type: JOYCE        RADIOLOGY & ADDITIONAL TESTS:    Consultant(s) Notes Reviewed:  [x] YES  [ ] NO    Care Discussed with Consultants/Other Providers [x] YES  [ ] NO

## 2019-06-11 NOTE — CONSULT NOTE ADULT - ASSESSMENT
Right styloid tip fx of wrist.    NWB r wrist  ambulate with platform walker as tolerated  repeat r wrist xrays today

## 2019-06-11 NOTE — DIETITIAN INITIAL EVALUATION ADULT. - PROBLEM SELECTOR PLAN 1
Start comprehensive inpatient acute rehab program consisting of PT, OT and ST.   Continue current PD medication regimen.  Monitor orthostatic hypotension.

## 2019-06-11 NOTE — DIETITIAN INITIAL EVALUATION ADULT. - OTHER INFO
74yr Old Female - Dx: Parkinson's - Initial Assessment - DASH-TLC Diet (Recommend Change to Low Sodium Diet & Recommend Initiate Ensure Enlive 8oz PO Daily) , Denies Food Allergy/Intolerance, Tolerates Diet Well, No Chewing/Swallowing Complications (Per Patient), Consumed 100% of 1st Meal (as Per Documentation), No Pressure Ulcers (as Per Nursing Flow Sheets), No Edema Noted (as Per Nursing Flow Sheets), No Recent Nausea/Vomiting/Diarrhea/Constipation (as Per Patient)

## 2019-06-11 NOTE — DIETITIAN INITIAL EVALUATION ADULT. - ENERGY NEEDS
Height: 63Inches   Weight: 155lb   Body Mass Index (BMI): 27.5kg/m2   Ideal Body Weight Range: 115lb (+/-10%)   Percent Ideal Body Weight ~135%

## 2019-06-12 DIAGNOSIS — G47.52 REM SLEEP BEHAVIOR DISORDER: ICD-10-CM

## 2019-06-12 LAB
ALBUMIN SERPL ELPH-MCNC: 3.4 G/DL — SIGNIFICANT CHANGE UP (ref 3.3–5)
ALP SERPL-CCNC: 97 U/L — SIGNIFICANT CHANGE UP (ref 40–120)
ALT FLD-CCNC: 12 U/L DA — SIGNIFICANT CHANGE UP (ref 10–45)
ANION GAP SERPL CALC-SCNC: 5 MMOL/L — SIGNIFICANT CHANGE UP (ref 5–17)
AST SERPL-CCNC: 15 U/L — SIGNIFICANT CHANGE UP (ref 10–40)
BILIRUB SERPL-MCNC: 0.4 MG/DL — SIGNIFICANT CHANGE UP (ref 0.2–1.2)
BUN SERPL-MCNC: 21 MG/DL — SIGNIFICANT CHANGE UP (ref 7–23)
CALCIUM SERPL-MCNC: 9.1 MG/DL — SIGNIFICANT CHANGE UP (ref 8.4–10.5)
CHLORIDE SERPL-SCNC: 104 MMOL/L — SIGNIFICANT CHANGE UP (ref 96–108)
CO2 SERPL-SCNC: 31 MMOL/L — SIGNIFICANT CHANGE UP (ref 22–31)
CREAT SERPL-MCNC: 1.08 MG/DL — SIGNIFICANT CHANGE UP (ref 0.5–1.3)
GLUCOSE SERPL-MCNC: 92 MG/DL — SIGNIFICANT CHANGE UP (ref 70–99)
HCT VFR BLD CALC: 38.6 % — SIGNIFICANT CHANGE UP (ref 34.5–45)
HGB BLD-MCNC: 12.4 G/DL — SIGNIFICANT CHANGE UP (ref 11.5–15.5)
MCHC RBC-ENTMCNC: 30.4 PG — SIGNIFICANT CHANGE UP (ref 27–34)
MCHC RBC-ENTMCNC: 32.1 GM/DL — SIGNIFICANT CHANGE UP (ref 32–36)
MCV RBC AUTO: 94.6 FL — SIGNIFICANT CHANGE UP (ref 80–100)
NRBC # BLD: 0 /100 WBCS — SIGNIFICANT CHANGE UP (ref 0–0)
PLATELET # BLD AUTO: 212 K/UL — SIGNIFICANT CHANGE UP (ref 150–400)
POTASSIUM SERPL-MCNC: 4 MMOL/L — SIGNIFICANT CHANGE UP (ref 3.5–5.3)
POTASSIUM SERPL-SCNC: 4 MMOL/L — SIGNIFICANT CHANGE UP (ref 3.5–5.3)
PROT SERPL-MCNC: 6.2 G/DL — SIGNIFICANT CHANGE UP (ref 6–8.3)
RBC # BLD: 4.08 M/UL — SIGNIFICANT CHANGE UP (ref 3.8–5.2)
RBC # FLD: 13.7 % — SIGNIFICANT CHANGE UP (ref 10.3–14.5)
SODIUM SERPL-SCNC: 140 MMOL/L — SIGNIFICANT CHANGE UP (ref 135–145)
WBC # BLD: 7.6 K/UL — SIGNIFICANT CHANGE UP (ref 3.8–10.5)
WBC # FLD AUTO: 7.6 K/UL — SIGNIFICANT CHANGE UP (ref 3.8–10.5)

## 2019-06-12 PROCEDURE — 73110 X-RAY EXAM OF WRIST: CPT | Mod: 26,RT

## 2019-06-12 PROCEDURE — 99233 SBSQ HOSP IP/OBS HIGH 50: CPT

## 2019-06-12 RX ADMIN — Medication 25 MILLIGRAM(S): at 15:57

## 2019-06-12 RX ADMIN — Medication 0.5 MILLIGRAM(S): at 17:47

## 2019-06-12 RX ADMIN — SENNA PLUS 2 TABLET(S): 8.6 TABLET ORAL at 21:01

## 2019-06-12 RX ADMIN — CARBIDOPA AND LEVODOPA 2 TABLET(S): 25; 100 TABLET ORAL at 13:02

## 2019-06-12 RX ADMIN — Medication 100 MILLIGRAM(S): at 06:33

## 2019-06-12 RX ADMIN — Medication 100 MILLIGRAM(S): at 17:47

## 2019-06-12 RX ADMIN — MEROPENEM 100 MILLIGRAM(S): 1 INJECTION INTRAVENOUS at 05:01

## 2019-06-12 RX ADMIN — CARBIDOPA AND LEVODOPA 2 TABLET(S): 25; 100 TABLET ORAL at 06:33

## 2019-06-12 RX ADMIN — Medication 1 MILLIGRAM(S): at 12:05

## 2019-06-12 RX ADMIN — MEMANTINE HYDROCHLORIDE 5 MILLIGRAM(S): 10 TABLET ORAL at 17:47

## 2019-06-12 RX ADMIN — SERTRALINE 200 MILLIGRAM(S): 25 TABLET, FILM COATED ORAL at 12:06

## 2019-06-12 RX ADMIN — ENOXAPARIN SODIUM 40 MILLIGRAM(S): 100 INJECTION SUBCUTANEOUS at 21:01

## 2019-06-12 RX ADMIN — CARBIDOPA AND LEVODOPA 1 TABLET(S): 25; 100 TABLET ORAL at 20:10

## 2019-06-12 RX ADMIN — Medication 0.5 MILLIGRAM(S): at 06:35

## 2019-06-12 RX ADMIN — MEROPENEM 100 MILLIGRAM(S): 1 INJECTION INTRAVENOUS at 14:19

## 2019-06-12 RX ADMIN — PANTOPRAZOLE SODIUM 40 MILLIGRAM(S): 20 TABLET, DELAYED RELEASE ORAL at 06:33

## 2019-06-12 RX ADMIN — Medication 25 MILLIGRAM(S): at 06:48

## 2019-06-12 RX ADMIN — CARBIDOPA AND LEVODOPA 2 TABLET(S): 25; 100 TABLET ORAL at 10:08

## 2019-06-12 RX ADMIN — Medication 25 MILLIGRAM(S): at 19:03

## 2019-06-12 RX ADMIN — CARBIDOPA AND LEVODOPA 2 TABLET(S): 25; 100 TABLET ORAL at 19:03

## 2019-06-12 RX ADMIN — Medication 25 MILLIGRAM(S): at 13:02

## 2019-06-12 RX ADMIN — MEROPENEM 100 MILLIGRAM(S): 1 INJECTION INTRAVENOUS at 21:02

## 2019-06-12 RX ADMIN — Medication 25 MILLIGRAM(S): at 10:08

## 2019-06-12 RX ADMIN — MEMANTINE HYDROCHLORIDE 5 MILLIGRAM(S): 10 TABLET ORAL at 06:33

## 2019-06-12 RX ADMIN — DONEPEZIL HYDROCHLORIDE 10 MILLIGRAM(S): 10 TABLET, FILM COATED ORAL at 21:01

## 2019-06-12 RX ADMIN — CARBIDOPA AND LEVODOPA 2 TABLET(S): 25; 100 TABLET ORAL at 15:57

## 2019-06-12 RX ADMIN — BUPROPION HYDROCHLORIDE 300 MILLIGRAM(S): 150 TABLET, EXTENDED RELEASE ORAL at 12:05

## 2019-06-12 NOTE — PROGRESS NOTE ADULT - SUBJECTIVE AND OBJECTIVE BOX
CHIEF COMPLAINT: reports disturbing  vivid dreams every night, wrist pain is controlled      HISTORY OF PRESENT ILLNESS    This is a 75 y/o female with PMHx advanced Parkinson's disease, orthostatic hypotension and anxiety brought in by her  to [facility] on 6/6/19 for ongoing body shaking at home and worsening unsteady gait. Patient fell 8 days ago and sustained a right wrist fracture that was splinted in the ED. On 6/5/19, per outpatient Neuro recs, Midodrine was increased to 10mg in AM along with continued 5mg in evening for severe orthostasis.  notes patient experiencing body shaking with position changes, is unaware of this and becomes extremely weak afterwards. No prior history of seizures. No head trauma, tongue biting or bowel incontinence. Patient is intermittently incontinent of urine and was treated for ESBL E. coli UTI in April. SInce then has not had recurrent UTI's.     In the ED on the 6th, notable labs included normal CBC, CMP and UA with concerns for pyuria. CT head with brain atrophy, CXR clear. Patient admitted.     During hospitalization at , neurology consulted. EEG done and did not show any epileptiform activity.  Recommended to continue current regimen of Parkinson's medications - Sinemet 25/100 2 tabs 5 times per day, carbidopa 25 mg five times per day, Sinemet CR 50/200 qhs and to continue Midodrine with consideration to increase to 10 mg BID if needed.  Urine culture sent and shows >100, 000 e coli +ESBL. ID consulted and started patient on meropenem 500mg [id number]. Patient to continue meropenem until end of day on 6/13.   Patient medically stable and accepted into the Parkinson's program here at [facility] under Dr Coello's supervision on 6/10/19. (10 Carlos 2019 15:21)        PAST MEDICAL & SURGICAL HISTORY:  Parkinson disease  Anxiety  Detached Retina, Left: and macular hole 5/2011  S/P Cataract Surgery: 2011  Macular Hole: 2010  Retinal Tear: 2003      VITALS  Vital Signs Last 24 Hrs  T(C): 37 (12 Jun 2019 07:45), Max: 37 (12 Jun 2019 07:45)  T(F): 98.6 (12 Jun 2019 07:45), Max: 98.6 (12 Jun 2019 07:45)  HR: 75 (12 Jun 2019 07:45) (75 - 84)  BP: 133/66 (12 Jun 2019 07:45) (113/72 - 133/66)  BP(mean): --  RR: 14 (12 Jun 2019 07:45) (13 - 14)  SpO2: 97% (12 Jun 2019 07:45) (95% - 97%)      PHYSICAL EXAM  Constitutional - NAD, Comfortable  HEENT - NCAT, EOMI  Neck - Supple, No limited ROM  Chest - CTA bilaterally,   Cardiovascular - RRR, S1S2,  Abdomen - BS+, Soft, NTND  Extremities - No C/C/E, No calf tenderness   Skin-no rash  Neurologic Exam - no tremor, LE>UE rigidity, decreased UE dexterity, shuffling gait                        FUNCTIONAL PROGRESS  Gait - 10 feet with max A  ADLs - Min A to total A  Transfers -max A  Functional transfer - mod A    RECENT LABS                        12.4   7.60  )-----------( 212      ( 12 Jun 2019 05:30 )             38.6     06-12    140  |  104  |  21  ----------------------------<  92  4.0   |  31  |  1.08    Ca    9.1      12 Jun 2019 05:30  Phos  3.7     06-11  Mg     2.3     06-11    TPro  6.2  /  Alb  3.4  /  TBili  0.4  /  DBili  x   /  AST  15  /  ALT  12  /  AlkPhos  97  06-12      LIVER FUNCTIONS - ( 12 Jun 2019 05:30 )  Alb: 3.4 g/dL / Pro: 6.2 g/dL / ALK PHOS: 97 U/L / ALT: 12 U/L DA / AST: 15 U/L / GGT: x             RADIOLOGY/OTHER RESULTS    < from: Xray Wrist 3 Views, Right (06.12.19 @ 10:08) >    EXAM:  WRIST RIGHT (MINIMUM 3 VIEWS)      PROCEDURE DATE:  06/12/2019        INTERPRETATION:  INDICATION: Distal radial fracture; follow-up assessment    PRIORS: 5/29/2019    VIEWS: AP, oblique, and lateral views of the right wrist region were   performed.    FINDINGS: There is no significant interval change in acute fracture   deformity of the distal right radius with suspected intra-articular   extension. Mild narrowing of the radiocarpal joint space is noted. There   is no evidence for acutefracture deformity of the distal right ulna.   Proximal carpal row articulation appears unremarkable. No soft tissue   swelling or retained radiodense foreign body noted.    IMPRESSION: There is no significant interval change in acute fracture   deformity of the distal right radius with suspected intra-articular   extension. Mild narrowing of the radiocarpal joint space is noted.       CURRENT MEDICATIONS  MEDICATIONS  (STANDING):  buPROPion XL . 300 milliGRAM(s) Oral daily  carbidopa 25 milliGRAM(s) Oral <User Schedule>  carbidopa/levodopa  25/100 2 Tablet(s) Oral <User Schedule>  carbidopa/levodopa CR 50/200 1 Tablet(s) Oral <User Schedule>  docusate sodium 100 milliGRAM(s) Oral two times a day  donepezil 10 milliGRAM(s) Oral at bedtime  enoxaparin Injectable 40 milliGRAM(s) SubCutaneous at bedtime  folic acid 1 milliGRAM(s) Oral daily  LORazepam     Tablet 0.5 milliGRAM(s) Oral two times a day  memantine 5 milliGRAM(s) Oral two times a day  meropenem  IVPB 500 milliGRAM(s) IV Intermittent every 8 hours  midodrine 10 milliGRAM(s) Oral <User Schedule>  midodrine 5 milliGRAM(s) Oral <User Schedule>  pantoprazole    Tablet 40 milliGRAM(s) Oral before breakfast  senna 2 Tablet(s) Oral at bedtime  sertraline 200 milliGRAM(s) Oral daily    MEDICATIONS  (PRN):  acetaminophen   Tablet .. 650 milliGRAM(s) Oral every 6 hours PRN Mild Pain (1 - 3)  polyethylene glycol 3350 17 Gram(s) Oral daily PRN Constipation      ASSESSMENT & PLAN          GI/Bowel Management - Dulcolax PRN, Fleet PRN   Management - Toilet Q2  Skin - Turn Q2  Pain - Tylenol PRN  DVT PPX - TEDs, SCDs  Diet -     Continue comprehensive acute rehab program consisting of 3hrs/day of OT/PT and SLP.

## 2019-06-12 NOTE — PROGRESS NOTE ADULT - SUBJECTIVE AND OBJECTIVE BOX
Patient is a 74y old  Female who presents with a chief complaint of pt is s/p fall on 5/29/19. (11 Jun 2019 09:02)      Patient seen and examined at bedside.    ALLERGIES:  No Known Allergies    MEDICATIONS:  carbidopa 25 milliGRAM(s) Oral <User Schedule>  polyethylene glycol 3350 17 Gram(s) Oral daily PRN    Vital Signs Last 24 Hrs  T(C): 37 (12 Jun 2019 07:45), Max: 37 (12 Jun 2019 07:45)  T(F): 98.6 (12 Jun 2019 07:45), Max: 98.6 (12 Jun 2019 07:45)  HR: 75 (12 Jun 2019 07:45) (75 - 84)  BP: 133/66 (12 Jun 2019 07:45) (113/72 - 133/66)  BP(mean): --  RR: 14 (12 Jun 2019 07:45) (13 - 14)  SpO2: 97% (12 Jun 2019 07:45) (95% - 97%)  I&O's Summary    11 Jun 2019 07:01  -  12 Jun 2019 07:00  --------------------------------------------------------  IN: 50 mL / OUT: 0 mL / NET: 50 mL          PAST MEDICAL & SURGICAL HISTORY:  Parkinson disease  Anxiety  Detached Retina, Left: and macular hole 5/2011  S/P Cataract Surgery: 2011  Macular Hole: 2010  Retinal Tear: 2003      Home Medications:  buPROPion 300 mg/24 hours (XL) oral tablet, extended release: 1 tab(s) orally every 24 hours (06 Jun 2019 14:54)  carbidopa 25 mg oral tablet: 1 tab(s) orally 5 times a day  ***7am,10am,1pm,4pm,7pm**** (06 Jun 2019 14:54)  carbidopa-levodopa 25 mg-100 mg oral tablet: 2 tab(s) orally 5 times a day  7am,10,1,4,7pm (06 Jun 2019 14:54)  carbidopa-levodopa 50 mg-200 mg oral tablet, extended release: 1 tab(s) orally once a day (at bedtime) (06 Jun 2019 14:54)  docusate sodium 100 mg oral capsule: 1 cap(s) orally once a day (at bedtime), As needed, Constipation (06 Jun 2019 14:54)  donepezil 10 mg oral tablet: 1 tab(s) orally once a day (at bedtime) (06 Jun 2019 14:54)  folic acid 1 mg oral tablet: 1 tab(s) orally once a day (06 Jun 2019 14:54)  LORazepam 0.5 mg oral tablet: 1 tab(s) orally 2 times a day (06 Jun 2019 14:54)  magnesium hydroxide 8% oral suspension: 30 milliliter(s) orally once a day, As needed, Constipation (06 Jun 2019 14:54)  memantine 5 mg oral tablet: 1 tab(s) orally 2 times a day (06 Jun 2019 14:54)  meropenem 500 mg intravenous injection: 500 milligram(s) intravenous every 8 hours (10 Carlos 2019 12:59)  midodrine 5 mg oral tablet: 2 tab(s) orally in AM then 1 tab in PM.   hold for SBP&gt;110 (06 Jun 2019 15:11)  senna oral tablet: 2 tab(s) orally once a day (at bedtime), As needed, Constipation (06 Jun 2019 14:54)  Zoloft 100 mg oral tablet: 2 tab(s) orally once a day (06 Jun 2019 14:54)      PHYSICAL EXAM:  General: NAD, A/O x3  ENT: MMM  Neck: Supple, No JVD  Lungs: Clear to percussion bilaterally  Cardio: RRR, S1/S2, No murmurs  Abdomen: Soft, Nontender, Nondistended; Bowel sounds present  Neuro: no new deficits   Extremities: No clubbing, cyanosis, or edema    LABS:                        12.4   7.60  )-----------( 212      ( 12 Jun 2019 05:30 )             38.6     06-12    140  |  104  |  21  ----------------------------<  92  4.0   |  31  |  1.08    Ca    9.1      12 Jun 2019 05:30  Phos  3.7     06-11  Mg     2.3     06-11    TPro  6.2  /  Alb  3.4  /  TBili  0.4  /  DBili  x   /  AST  15  /  ALT  12  /  AlkPhos  97  06-12    RADIOLOGY & ADDITIONAL TESTS: no new tests     Care Discussed with Consultants/Other Providers: PM&R team

## 2019-06-12 NOTE — PROGRESS NOTE ADULT - PROBLEM SELECTOR PLAN 3
Orthopedic eval appreciated, case discussed  Will repeat xrays reviewed  Will continue NWBS for total of 6 weeks as per Orthopedic surgery

## 2019-06-12 NOTE — PROGRESS NOTE ADULT - ASSESSMENT
This is a 73 y/o female with PMHx advanced Parkinson's disease, orthostatic hypotension and anxiety, recent fall with +right wrist fx, brought in by her  to [facility] on 6/6/19 for ongoing body shaking at home and worsening unsteady gait, found to have e coli ESBL+ UTI and placed on meropenem, being admitted to the Parkinson's program here at [facility] for acute inpatient treatment of Parkinson's disease, gait and ADL dysfunction.

## 2019-06-12 NOTE — PROGRESS NOTE ADULT - ASSESSMENT
75 y/o female with PMHx advanced Parkinson's disease, orthostatic hypotension, and anxiety, recent fall with +right wrist fracture, brought in by her  to [facility] on 6/6/2019 for ongoing body shaking at home and worsening unsteady gait, found to have e coli ESBL+ UTI and placed on meropenem, being admitted to the Parkinson's program here at [facility] for acute inpatient treatment of Parkinson's disease, gait and ADL dysfunction.     > Parkinson's disease  - c/w carbidopa/levodopa per neurology  - comprehensive PT/OT/ rehab/ SLP program, Parkinson's program    >E. coli ESBL+ UTI  - last day of meropenem on 6/13/19     > Right UE with wrist fracture  - c/w splint, pain control  - NWB per Orthopedic eval     > Orthostatic hypotension  - c/w midodrine     > Anxiety  - c/w LORazepam     Tablet 0.5 milliGRAM(s) Oral two times a day, buPROPion XL . 300 milliGRAM(s) Oral daily, sertraline 200 milliGRAM(s) Oral daily  - f/u psychiatry    > Constipation   - c/w docusate sodium 100 milliGRAM(s) Oral two times a day, senna 2 Tablet(s) Oral at bedtime    > Dementia   - c/w donepezil 10 milliGRAM(s) Oral at bedtime, memantine 5 milliGRAM(s) Oral two times a day, folic acid 1 milliGRAM(s) Oral daily  - frequent re-orientation    > GERD  - c/w pantoprazole    Tablet 40 milliGRAM(s) Oral before breakfast    > DVT ppx:  - enoxaparin Injectable 40 milliGRAM(s) SubCutaneous at bedtime

## 2019-06-13 PROCEDURE — 99233 SBSQ HOSP IP/OBS HIGH 50: CPT

## 2019-06-13 PROCEDURE — 99232 SBSQ HOSP IP/OBS MODERATE 35: CPT

## 2019-06-13 RX ADMIN — Medication 25 MILLIGRAM(S): at 13:09

## 2019-06-13 RX ADMIN — ENOXAPARIN SODIUM 40 MILLIGRAM(S): 100 INJECTION SUBCUTANEOUS at 21:35

## 2019-06-13 RX ADMIN — Medication 1 MILLIGRAM(S): at 21:35

## 2019-06-13 RX ADMIN — Medication 25 MILLIGRAM(S): at 06:44

## 2019-06-13 RX ADMIN — CARBIDOPA AND LEVODOPA 2 TABLET(S): 25; 100 TABLET ORAL at 10:16

## 2019-06-13 RX ADMIN — CARBIDOPA AND LEVODOPA 2 TABLET(S): 25; 100 TABLET ORAL at 06:44

## 2019-06-13 RX ADMIN — SENNA PLUS 2 TABLET(S): 8.6 TABLET ORAL at 21:34

## 2019-06-13 RX ADMIN — Medication 0.5 MILLIGRAM(S): at 06:44

## 2019-06-13 RX ADMIN — BUPROPION HYDROCHLORIDE 300 MILLIGRAM(S): 150 TABLET, EXTENDED RELEASE ORAL at 12:16

## 2019-06-13 RX ADMIN — Medication 25 MILLIGRAM(S): at 10:16

## 2019-06-13 RX ADMIN — DONEPEZIL HYDROCHLORIDE 10 MILLIGRAM(S): 10 TABLET, FILM COATED ORAL at 21:34

## 2019-06-13 RX ADMIN — MEMANTINE HYDROCHLORIDE 5 MILLIGRAM(S): 10 TABLET ORAL at 18:36

## 2019-06-13 RX ADMIN — Medication 0.5 MILLIGRAM(S): at 18:36

## 2019-06-13 RX ADMIN — Medication 100 MILLIGRAM(S): at 18:36

## 2019-06-13 RX ADMIN — CARBIDOPA AND LEVODOPA 2 TABLET(S): 25; 100 TABLET ORAL at 16:52

## 2019-06-13 RX ADMIN — Medication 25 MILLIGRAM(S): at 18:36

## 2019-06-13 RX ADMIN — MEMANTINE HYDROCHLORIDE 5 MILLIGRAM(S): 10 TABLET ORAL at 06:44

## 2019-06-13 RX ADMIN — CARBIDOPA AND LEVODOPA 2 TABLET(S): 25; 100 TABLET ORAL at 13:09

## 2019-06-13 RX ADMIN — CARBIDOPA AND LEVODOPA 1 TABLET(S): 25; 100 TABLET ORAL at 19:58

## 2019-06-13 RX ADMIN — SERTRALINE 200 MILLIGRAM(S): 25 TABLET, FILM COATED ORAL at 12:16

## 2019-06-13 RX ADMIN — Medication 1 MILLIGRAM(S): at 12:16

## 2019-06-13 RX ADMIN — CARBIDOPA AND LEVODOPA 2 TABLET(S): 25; 100 TABLET ORAL at 18:37

## 2019-06-13 RX ADMIN — MEROPENEM 100 MILLIGRAM(S): 1 INJECTION INTRAVENOUS at 06:34

## 2019-06-13 RX ADMIN — Medication 25 MILLIGRAM(S): at 16:52

## 2019-06-13 NOTE — PROGRESS NOTE ADULT - ASSESSMENT
75 y/o female with PMHx advanced Parkinson's disease, orthostatic hypotension, and anxiety, recent fall with +right wrist fracture, brought in by her  to [facility] on 6/6/2019 for ongoing body shaking at home and worsening unsteady gait, found to have e coli ESBL+ UTI and placed on meropenem, being admitted to the Parkinson's program here at [facility] for acute inpatient treatment of Parkinson's disease, gait and ADL dysfunction.     > Parkinson's disease  - c/w carbidopa/levodopa per neurology  - comprehensive PT/OT/ rehab/ SLP program, Parkinson's program    >E. coli ESBL+ UTI  - last day of meropenem today  6/13/19     > Right UE with wrist fracture  - c/w splint, pain control  - NWB per Orthopedic eval     > Orthostatic hypotension  - c/w midodrine BP improved this am will hold midodrine dose this morning  as per perematers     > Anxiety  - c/w LORazepam     Tablet 0.5 milliGRAM(s) Oral two times a day, buPROPion XL . 300 milliGRAM(s) Oral daily, sertraline 200 milliGRAM(s) Oral daily  - f/u psychiatry    > Constipation   - c/w docusate sodium 100 milliGRAM(s) Oral two times a day, senna 2 Tablet(s) Oral at bedtime    > Dementia   - c/w donepezil 10 milliGRAM(s) Oral at bedtime, memantine 5 milliGRAM(s) Oral two times a day, folic acid 1 milliGRAM(s) Oral daily  - frequent re-orientation    > GERD  - c/w pantoprazole    Tablet 40 milliGRAM(s) Oral before breakfast    > DVT ppx:  - enoxaparin Injectable 40 milliGRAM(s) SubCutaneous at bedtime

## 2019-06-13 NOTE — PROGRESS NOTE ADULT - SUBJECTIVE AND OBJECTIVE BOX
Patient is a 74y old  Female who presents with a chief complaint of Parkinson's disease .  Pt without complaints today.  BP up this am 140's pt up in chair 116 will hold mididrine today       Patient seen and examined at bedside.    ALLERGIES:  No Known Allergies    MEDICATIONS  (STANDING):  buPROPion XL . 300 milliGRAM(s) Oral daily  carbidopa 25 milliGRAM(s) Oral <User Schedule>  carbidopa/levodopa  25/100 2 Tablet(s) Oral <User Schedule>  carbidopa/levodopa CR 50/200 1 Tablet(s) Oral <User Schedule>  docusate sodium 100 milliGRAM(s) Oral two times a day  donepezil 10 milliGRAM(s) Oral at bedtime  enoxaparin Injectable 40 milliGRAM(s) SubCutaneous at bedtime  folic acid 1 milliGRAM(s) Oral daily  LORazepam     Tablet 0.5 milliGRAM(s) Oral two times a day  memantine 5 milliGRAM(s) Oral two times a day  meropenem  IVPB 500 milliGRAM(s) IV Intermittent every 8 hours  midodrine 10 milliGRAM(s) Oral <User Schedule>  midodrine 5 milliGRAM(s) Oral <User Schedule>  pantoprazole    Tablet 40 milliGRAM(s) Oral before breakfast  senna 2 Tablet(s) Oral at bedtime  sertraline 200 milliGRAM(s) Oral daily    MEDICATIONS  (PRN):  acetaminophen   Tablet .. 650 milliGRAM(s) Oral every 6 hours PRN Mild Pain (1 - 3)  polyethylene glycol 3350 17 Gram(s) Oral daily PRN Constipation    Vital Signs Last 24 Hrs  T(F): 98.1 (13 Jun 2019 08:28), Max: 98.2 (12 Jun 2019 20:56)  HR: 77 (13 Jun 2019 08:28) (69 - 78)  BP: 113/62 (13 Jun 2019 08:28) (113/62 - 143/90)  RR: 15 (13 Jun 2019 08:28) (14 - 15)  SpO2: 95% (13 Jun 2019 08:28) (95% - 96%)  I&O's Summary    12 Jun 2019 07:01  -  13 Jun 2019 07:00  --------------------------------------------------------  IN: 50 mL / OUT: 0 mL / NET: 50 mL      PHYSICAL EXAM:  General: NAD, Awake forgetful   ENT: MMM  Neck: Supple, No JVD  Lungs: Clear to auscultation bilaterally  Cardio: RRR, S1/S2, No murmurs  Abdomen: Soft, Nontender, Nondistended; Bowel sounds present  Extremities: No calf tenderness, No pitting edema    LABS:                        12.4   7.60  )-----------( 212      ( 12 Jun 2019 05:30 )             38.6     06-12    140  |  104  |  21  ----------------------------<  92  4.0   |  31  |  1.08    Ca    9.1      12 Jun 2019 05:30  Phos  3.7     06-11  Mg     2.3     06-11    TPro  6.2  /  Alb  3.4  /  TBili  0.4  /  DBili  x   /  AST  15  /  ALT  12  /  AlkPhos  97  06-12    eGFR if Non African American: 51 mL/min/1.73M2 (06-12-19 @ 05:30)  eGFR if African American: 60 mL/min/1.73M2 (06-12-19 @ 05:30)                    CAPILLARY BLOOD GLUCOSE              RADIOLOGY & ADDITIONAL TESTS:    Care Discussed with Consultants/Other Providers:

## 2019-06-13 NOTE — CONSULT NOTE ADULT - SUBJECTIVE AND OBJECTIVE BOX
HPI:   Patient is a 74y female with Parkinsons disease, orthostatic hypotension admitted to [facility] a week ago for increased shaking and decline in function with fall recently and worsened orthostasis. She had urinary incontinence and grew esbl ecoli from urine with no pyuria but was started on meropenem on June 8. She had no fever. She came here for rehab a few days ago and we are called to determine further duration of antibiotics. The end date per providers at [facility] is today. The patient denies any dysuria, suprapubic pain or flank pain.     REVIEW OF SYSTEMS:  All other review of systems negative (Comprehensive ROS)    PAST MEDICAL & SURGICAL HISTORY:  Parkinson disease  Anxiety  Detached Retina, Left: and macular hole 5/2011  S/P Cataract Surgery: 2011  Macular Hole: 2010  Retinal Tear: 2003      Allergies    No Known Allergies    Intolerances        Antimicrobials Day #  :    Other Medications:  acetaminophen   Tablet .. 650 milliGRAM(s) Oral every 6 hours PRN  buPROPion XL . 300 milliGRAM(s) Oral daily  carbidopa 25 milliGRAM(s) Oral <User Schedule>  carbidopa/levodopa  25/100 2 Tablet(s) Oral <User Schedule>  carbidopa/levodopa CR 50/200 1 Tablet(s) Oral <User Schedule>  docusate sodium 100 milliGRAM(s) Oral two times a day  donepezil 10 milliGRAM(s) Oral at bedtime  enoxaparin Injectable 40 milliGRAM(s) SubCutaneous at bedtime  folic acid 1 milliGRAM(s) Oral daily  LORazepam     Tablet 0.5 milliGRAM(s) Oral two times a day  memantine 5 milliGRAM(s) Oral two times a day  midodrine 10 milliGRAM(s) Oral <User Schedule>  midodrine 5 milliGRAM(s) Oral <User Schedule>  pantoprazole    Tablet 40 milliGRAM(s) Oral before breakfast  polyethylene glycol 3350 17 Gram(s) Oral daily PRN  senna 2 Tablet(s) Oral at bedtime  sertraline 200 milliGRAM(s) Oral daily      FAMILY HISTORY:      SOCIAL HISTORY:  Smoking: [ ]Yes [x ]No  ETOH: [ ]Yes [ x]No  Drug Use: [ ]Yes x[ ]No   [ x] Single[ ]    T(F): 98.1 (06-13-19 @ 08:28), Max: 98.2 (06-12-19 @ 20:56)  HR: 77 (06-13-19 @ 08:28)  BP: 113/62 (06-13-19 @ 08:28)  RR: 15 (06-13-19 @ 08:28)  SpO2: 95% (06-13-19 @ 08:28)  Wt(kg): --    PHYSICAL EXAM:  General: alert, no acute distress  Eyes:  anicteric, no conjunctival injection, no discharge  Oropharynx: no lesions or injection 	  Neck: supple, without adenopathy  Lungs: clear to auscultation  Heart: regular rate and rhythm; no murmur, rubs or gallops  Abdomen: soft, nondistended, nontender, without mass or organomegaly  Skin: no lesions  Extremities: no clubbing, cyanosis, or edema  Neurologic: alert,  moves all extremities    LAB RESULTS:                        12.4   7.60  )-----------( 212      ( 12 Jun 2019 05:30 )             38.6     06-12    140  |  104  |  21  ----------------------------<  92  4.0   |  31  |  1.08    Ca    9.1      12 Jun 2019 05:30    TPro  6.2  /  Alb  3.4  /  TBili  0.4  /  DBili  x   /  AST  15  /  ALT  12  /  AlkPhos  97  06-12    LIVER FUNCTIONS - ( 12 Jun 2019 05:30 )  Alb: 3.4 g/dL / Pro: 6.2 g/dL / ALK PHOS: 97 U/L / ALT: 12 U/L DA / AST: 15 U/L / GGT: x               MICROBIOLOGY:  RECENT CULTURES:  Culture - Urine (06.06.19 @ 11:24)    -  Tobramycin: R >8    -  Trimethoprim/Sulfamethoxazole: R >2/38    -  Amikacin: S <=16    -  Ampicillin: R >16 These ampicillin results predict results for amoxicillin    -  Ampicillin/Sulbactam: R 16/8    -  Aztreonam: R >16    -  Cefazolin: R >16 For uncomplicated UTI with K. pneumoniae, E. coli, or P. mirablis: JOYCE <=16 is sensitive and JOYCE >=32 is resistant. This also predicts results for oral agents cefaclor, cefdinir, cefpodoxime, cefprozil, cefuroxime axetil, cephalexin and locarbef for uncomplicated UTI. Note that some isolates may be susceptible to these agents while testing resistant to cefazolin.    -  Cefepime: R >16    -  Cefoxitin: I 16    -  Ceftriaxone: R >32 Enterobacter, Citrobacter, and Serratia may develop resistance during prolonged therapy    -  Ciprofloxacin: R >2    -  Ertapenem: S <=1    -  Gentamicin: R >8    -  Imipenem: S <=1    -  Levofloxacin: R >4    -  Meropenem: S <=1    -  Nitrofurantoin: S <=32 Should not be used to treat pyelonephritis    -  Piperacillin/Tazobactam: R <=16    -  Tigecycline: S <=2    Specimen Source: .Urine Clean Catch (Midstream)    Culture Results:   >100,000 CFU/ml Escherichia coli ESBL    Organism Identification: Escherichia coli ESBL    Organism: Escherichia coli ESBL    Method Type: JOYCE  Urine Microscopic-Add On (NC) (06.06.19 @ 11:24)    Red Blood Cell - Urine: Negative /HPF    White Blood Cell - Urine: 3-5    Bacteria: Many            RADIOLOGY REVIEWED:    < from: CT Head No Cont (06.06.19 @ 11:11) >  IMPRESSION:    1)  mild volume loss again noted along with borderline prominence of the   ventricles. No acute infarct or hemorrhage suggested. Follow-up MR   imaging advised.  2)  clear sinuses and mastoids..      < end of copied text >        Impression:  Patient with worsening parkinsons disease , has what I would consider assymptomatic bactiuria but nevertheless has completed an adequate course of meropenem for cystitis.   Recommendations:  stop meropenem and observe off antibiotics

## 2019-06-13 NOTE — PROGRESS NOTE ADULT - ASSESSMENT
This is a 73 y/o female with PMHx advanced Parkinson's disease, orthostatic hypotension and anxiety, recent fall with +right wrist fx, brought in by her  to [facility] on 6/6/19 for ongoing body shaking at home and worsening unsteady gait, found to have e coli ESBL+ UTI and placed on meropenem, being admitted to the Parkinson's program here at [facility] for acute inpatient treatment of Parkinson's disease, gait and ADL dysfunction.

## 2019-06-13 NOTE — PROGRESS NOTE ADULT - PROBLEM SELECTOR PLAN 3
Orthopedic eval appreciated, case discussed c Dr Fong yesterday. Repeat wrist xrays reviewed. Will continue NWBS for total of 6 weeks as per Orthopedic surgery. Continue splint.

## 2019-06-13 NOTE — PROGRESS NOTE ADULT - PROBLEM SELECTOR PLAN 1
On comprehensive inpatient acute rehab program consisting of PT, OT and ST.   Continue current PD medication regimen.  Monitor orthostatic hypotension.  Fall precautions

## 2019-06-13 NOTE — PROGRESS NOTE ADULT - SUBJECTIVE AND OBJECTIVE BOX
CHIEF COMPLAINT: Offers no complaints.       HISTORY OF PRESENT ILLNESS  This is a 75 y/o female with PMHx advanced Parkinson's disease, orthostatic hypotension and anxiety brought in by her  to [facility] on 6/6/19 for ongoing body shaking at home and worsening unsteady gait. Patient fell 8 days ago and sustained a right wrist fracture that was splinted in the ED. On 6/5/19, per outpatient Neuro recs, Midodrine was increased to 10mg in AM along with continued 5mg in evening for severe orthostasis.  notes patient experiencing body shaking with position changes, is unaware of this and becomes extremely weak afterwards. No prior history of seizures. No head trauma, tongue biting or bowel incontinence. Patient is intermittently incontinent of urine and was treated for ESBL E. coli UTI in April. SInce then has not had recurrent UTI's.     In the ED on the 6th, notable labs included normal CBC, CMP and UA with concerns for pyuria. CT head with brain atrophy, CXR clear. Patient admitted.     During hospitalization at , neurology consulted. EEG done and did not show any epileptiform activity.  Recommended to continue current regimen of Parkinson's medications - Sinemet 25/100 2 tabs 5 times per day, carbidopa 25 mg five times per day, Sinemet CR 50/200 qhs and to continue Midodrine with consideration to increase to 10 mg BID if needed.  Urine culture sent and shows >100, 000 e coli +ESBL. ID consulted and started patient on meropenem 500mg n2rilsl. Patient to continue meropenem until end of day on 6/13.   Patient medically stable and accepted into the Parkinson's program here at [facility] under Dr Coello's supervision on 6/10/19. (10 Carlos 2019 15:21)      PAST MEDICAL & SURGICAL HISTORY:  Parkinson disease  Anxiety  Detached Retina, Left: and macular hole 5/2011  S/P Cataract Surgery: 2011  Macular Hole: 2010  Retinal Tear: 2003       REVIEW OF SYMPTOMS  [X] Constitutional WNL     [X] Cardio WNL            [X] Resp WNL           [X] GI WNL                          [X]  WNL                   [X] Heme WNL              [X] Endo WNL                     [X] Skin WNL           VITALS  Vital Signs Last 24 Hrs  T(C): 36.7 (13 Jun 2019 08:28), Max: 36.8 (12 Jun 2019 20:56)  T(F): 98.1 (13 Jun 2019 08:28), Max: 98.2 (12 Jun 2019 20:56)  HR: 77 (13 Jun 2019 08:28) (69 - 78)  BP: 113/62 (13 Jun 2019 08:28) (113/62 - 143/90)  BP(mean): --  RR: 15 (13 Jun 2019 08:28) (14 - 15)  SpO2: 95% (13 Jun 2019 08:28) (95% - 96%)    PHYSICAL EXAM  Constitutional - NAD, Comfortable  HEENT - NCAT, EOMI  Neck - Supple, No limited ROM  Chest - CTA bilaterally,   Cardiovascular - RRR, S1S2,  Abdomen - BS+, Soft, NTND  Extremities - No C/C/E, No calf tenderness, limited ROM R wrist-s/p fx   Skin-no rash  Neurologic Exam - no tremor, LE>UE rigidity, decreased UE dexterity, shuffling gait                        FUNCTIONAL PROGRESS  Gait - 20 feet with max/mod A  ADLs - Min A to total A  Transfers -max A  Functional transfer - mod A    RECENT LABS                        12.4   7.60  )-----------( 212      ( 12 Jun 2019 05:30 )             38.6     06-12    140  |  104  |  21  ----------------------------<  92  4.0   |  31  |  1.08    Ca    9.1      12 Jun 2019 05:30    TPro  6.2  /  Alb  3.4  /  TBili  0.4  /  DBili  x   /  AST  15  /  ALT  12  /  AlkPhos  97  06-12      LIVER FUNCTIONS - ( 12 Jun 2019 05:30 )  Alb: 3.4 g/dL / Pro: 6.2 g/dL / ALK PHOS: 97 U/L / ALT: 12 U/L DA / AST: 15 U/L / GGT: x                           RADIOLOGY/OTHER RESULTS      CURRENT MEDICATIONS  MEDICATIONS  (STANDING):  buPROPion XL . 300 milliGRAM(s) Oral daily  carbidopa 25 milliGRAM(s) Oral <User Schedule>  carbidopa/levodopa  25/100 2 Tablet(s) Oral <User Schedule>  carbidopa/levodopa CR 50/200 1 Tablet(s) Oral <User Schedule>  docusate sodium 100 milliGRAM(s) Oral two times a day  donepezil 10 milliGRAM(s) Oral at bedtime  enoxaparin Injectable 40 milliGRAM(s) SubCutaneous at bedtime  folic acid 1 milliGRAM(s) Oral daily  LORazepam     Tablet 0.5 milliGRAM(s) Oral two times a day  memantine 5 milliGRAM(s) Oral two times a day  meropenem  IVPB 500 milliGRAM(s) IV Intermittent every 8 hours  midodrine 10 milliGRAM(s) Oral <User Schedule>  midodrine 5 milliGRAM(s) Oral <User Schedule>  pantoprazole    Tablet 40 milliGRAM(s) Oral before breakfast  senna 2 Tablet(s) Oral at bedtime  sertraline 200 milliGRAM(s) Oral daily    MEDICATIONS  (PRN):  acetaminophen   Tablet .. 650 milliGRAM(s) Oral every 6 hours PRN Mild Pain (1 - 3)  polyethylene glycol 3350 17 Gram(s) Oral daily PRN Constipation      ASSESSMENT & PLAN      GI/Bowel Management - Colace   Management - Toilet Q2  Skin - Turn Q2  Pain - Tylenol PRN  DVT PPX - Lovenox  Diet - reg c thins    Continue comprehensive acute rehab program consisting of 3hrs/day of OT/PT and SLP.

## 2019-06-14 LAB
ALBUMIN SERPL ELPH-MCNC: 3.4 G/DL — SIGNIFICANT CHANGE UP (ref 3.3–5)
ALP SERPL-CCNC: 98 U/L — SIGNIFICANT CHANGE UP (ref 40–120)
ALT FLD-CCNC: 11 U/L DA — SIGNIFICANT CHANGE UP (ref 10–45)
ANION GAP SERPL CALC-SCNC: 7 MMOL/L — SIGNIFICANT CHANGE UP (ref 5–17)
AST SERPL-CCNC: 17 U/L — SIGNIFICANT CHANGE UP (ref 10–40)
BILIRUB SERPL-MCNC: 0.5 MG/DL — SIGNIFICANT CHANGE UP (ref 0.2–1.2)
BUN SERPL-MCNC: 17 MG/DL — SIGNIFICANT CHANGE UP (ref 7–23)
CALCIUM SERPL-MCNC: 9.2 MG/DL — SIGNIFICANT CHANGE UP (ref 8.4–10.5)
CHLORIDE SERPL-SCNC: 106 MMOL/L — SIGNIFICANT CHANGE UP (ref 96–108)
CO2 SERPL-SCNC: 30 MMOL/L — SIGNIFICANT CHANGE UP (ref 22–31)
CREAT SERPL-MCNC: 0.87 MG/DL — SIGNIFICANT CHANGE UP (ref 0.5–1.3)
GLUCOSE SERPL-MCNC: 80 MG/DL — SIGNIFICANT CHANGE UP (ref 70–99)
HCT VFR BLD CALC: 39.2 % — SIGNIFICANT CHANGE UP (ref 34.5–45)
HGB BLD-MCNC: 12.3 G/DL — SIGNIFICANT CHANGE UP (ref 11.5–15.5)
MCHC RBC-ENTMCNC: 29.6 PG — SIGNIFICANT CHANGE UP (ref 27–34)
MCHC RBC-ENTMCNC: 31.4 GM/DL — LOW (ref 32–36)
MCV RBC AUTO: 94.5 FL — SIGNIFICANT CHANGE UP (ref 80–100)
NRBC # BLD: 0 /100 WBCS — SIGNIFICANT CHANGE UP (ref 0–0)
PLATELET # BLD AUTO: 208 K/UL — SIGNIFICANT CHANGE UP (ref 150–400)
POTASSIUM SERPL-MCNC: 3.9 MMOL/L — SIGNIFICANT CHANGE UP (ref 3.5–5.3)
POTASSIUM SERPL-SCNC: 3.9 MMOL/L — SIGNIFICANT CHANGE UP (ref 3.5–5.3)
PROT SERPL-MCNC: 6.3 G/DL — SIGNIFICANT CHANGE UP (ref 6–8.3)
RBC # BLD: 4.15 M/UL — SIGNIFICANT CHANGE UP (ref 3.8–5.2)
RBC # FLD: 13.5 % — SIGNIFICANT CHANGE UP (ref 10.3–14.5)
SODIUM SERPL-SCNC: 143 MMOL/L — SIGNIFICANT CHANGE UP (ref 135–145)
WBC # BLD: 6.47 K/UL — SIGNIFICANT CHANGE UP (ref 3.8–10.5)
WBC # FLD AUTO: 6.47 K/UL — SIGNIFICANT CHANGE UP (ref 3.8–10.5)

## 2019-06-14 PROCEDURE — 99232 SBSQ HOSP IP/OBS MODERATE 35: CPT

## 2019-06-14 PROCEDURE — 99233 SBSQ HOSP IP/OBS HIGH 50: CPT

## 2019-06-14 RX ADMIN — Medication 25 MILLIGRAM(S): at 10:00

## 2019-06-14 RX ADMIN — CARBIDOPA AND LEVODOPA 1 TABLET(S): 25; 100 TABLET ORAL at 20:45

## 2019-06-14 RX ADMIN — ENOXAPARIN SODIUM 40 MILLIGRAM(S): 100 INJECTION SUBCUTANEOUS at 21:00

## 2019-06-14 RX ADMIN — SENNA PLUS 2 TABLET(S): 8.6 TABLET ORAL at 21:00

## 2019-06-14 RX ADMIN — Medication 25 MILLIGRAM(S): at 13:46

## 2019-06-14 RX ADMIN — MEMANTINE HYDROCHLORIDE 5 MILLIGRAM(S): 10 TABLET ORAL at 06:44

## 2019-06-14 RX ADMIN — SERTRALINE 100 MILLIGRAM(S): 25 TABLET, FILM COATED ORAL at 11:18

## 2019-06-14 RX ADMIN — Medication 100 MILLIGRAM(S): at 17:46

## 2019-06-14 RX ADMIN — CARBIDOPA AND LEVODOPA 2 TABLET(S): 25; 100 TABLET ORAL at 06:45

## 2019-06-14 RX ADMIN — Medication 0.5 MILLIGRAM(S): at 17:46

## 2019-06-14 RX ADMIN — CARBIDOPA AND LEVODOPA 2 TABLET(S): 25; 100 TABLET ORAL at 13:46

## 2019-06-14 RX ADMIN — Medication 100 MILLIGRAM(S): at 06:44

## 2019-06-14 RX ADMIN — Medication 25 MILLIGRAM(S): at 16:05

## 2019-06-14 RX ADMIN — BUPROPION HYDROCHLORIDE 300 MILLIGRAM(S): 150 TABLET, EXTENDED RELEASE ORAL at 11:18

## 2019-06-14 RX ADMIN — CARBIDOPA AND LEVODOPA 2 TABLET(S): 25; 100 TABLET ORAL at 16:05

## 2019-06-14 RX ADMIN — DONEPEZIL HYDROCHLORIDE 10 MILLIGRAM(S): 10 TABLET, FILM COATED ORAL at 21:00

## 2019-06-14 RX ADMIN — Medication 0.5 MILLIGRAM(S): at 06:44

## 2019-06-14 RX ADMIN — CARBIDOPA AND LEVODOPA 2 TABLET(S): 25; 100 TABLET ORAL at 10:01

## 2019-06-14 RX ADMIN — PANTOPRAZOLE SODIUM 40 MILLIGRAM(S): 20 TABLET, DELAYED RELEASE ORAL at 06:44

## 2019-06-14 RX ADMIN — Medication 1 MILLIGRAM(S): at 11:19

## 2019-06-14 RX ADMIN — CARBIDOPA AND LEVODOPA 2 TABLET(S): 25; 100 TABLET ORAL at 18:56

## 2019-06-14 RX ADMIN — Medication 25 MILLIGRAM(S): at 06:46

## 2019-06-14 RX ADMIN — MEMANTINE HYDROCHLORIDE 5 MILLIGRAM(S): 10 TABLET ORAL at 17:46

## 2019-06-14 RX ADMIN — Medication 25 MILLIGRAM(S): at 18:56

## 2019-06-14 RX ADMIN — Medication 1 MILLIGRAM(S): at 21:00

## 2019-06-14 NOTE — PROGRESS NOTE ADULT - PROBLEM SELECTOR PLAN 3
Orthopedic input  appreciated.    Repeat wrist xrays reviewed. Will continue NWBS for total of 6 weeks as per Orthopedic surgery. Splint is  in place.

## 2019-06-14 NOTE — PROGRESS NOTE ADULT - ASSESSMENT
75 y/o female with PMHx advanced Parkinson's disease, orthostatic hypotension, and anxiety, recent fall with +right wrist fracture, brought in by her  to  on 6/6/2019 for ongoing body shaking at home and worsening unsteady gait, found to have e coli ESBL+ UTI and placed on meropenem, being admitted to the Parkinson's program here at [facility] for acute inpatient treatment of Parkinson's disease, gait and ADL dysfunction.     > Parkinson's disease  - c/w carbidopa/levodopa per neurology  - comprehensive PT/OT/ rehab/ SLP program, Parkinson's program    >E. coli ESBL+ UTI  -off antibiotics as of today     > Right UE  wrist fracture  - c/w splint, pain control  - NWB per Orthopedic eval   --f/u wrist Xray  IMPRESSION: There is no significant interval change in acute fracture   deformity of the distal right radius with suspected intra-articular   extension. Mild narrowing of the radiocarpal joint space is noted.      > Orthostatic hypotension  - c/w midodrine BP improved this am will hold midodrine dose this morning  as per perematers     > Anxiety  - c/w LORazepam     Tablet 0.5 milliGRAM(s) Oral two times a day, buPROPion XL . 300 milliGRAM(s) Oral daily, sertraline 200 milliGRAM(s) Oral daily  - f/u psychiatry    > Constipation   - c/w docusate sodium 100 milliGRAM(s) Oral two times a day, senna 2 Tablet(s) Oral at bedtime    > Dementia   - c/w donepezil 10 milliGRAM(s) Oral at bedtime, memantine 5 milliGRAM(s) Oral two times a day, folic acid 1 milliGRAM(s) Oral daily  - frequent re-orientation    > GERD  - c/w pantoprazole    Tablet 40 milliGRAM(s) Oral before breakfast    > DVT ppx:  - enoxaparin Injectable 40 milliGRAM(s) SubCutaneous at bedtime

## 2019-06-14 NOTE — CHART NOTE - NSCHARTNOTEFT_GEN_A_CORE
Nutrition Follow Up Note  Hospital Course   (Per Electronic Medical Record):     Source:   Patient [X]  Medical Record [X]      Diet:   Low Sodium Diet w/ Thin Liquids - Order Pending Verification      Tolerates Diet Well  No Chewing/Swallowing Difficulties   No Recent Vomiting, Diarrhea or Constipation   Varied Intake @Meals (as Per Documentation)   Ensure Enlive 8oz PO Daily (Provides 350kcal & 20grams of Protein) - Order Pending Verification   Educated Patient on Proper Nutrition    Enteral/Parenteral Nutrition: N/A    Current Weight:   154.9lb on 6/10  Obtain Weights Weekly  Obtain New Weight     Pertinent Medications: MEDICATIONS  (STANDING):  buPROPion XL . 300 milliGRAM(s) Oral daily  carbidopa 25 milliGRAM(s) Oral <User Schedule>  carbidopa/levodopa  25/100 2 Tablet(s) Oral <User Schedule>  carbidopa/levodopa CR 50/200 1 Tablet(s) Oral <User Schedule>  docusate sodium 100 milliGRAM(s) Oral two times a day  donepezil 10 milliGRAM(s) Oral at bedtime  enoxaparin Injectable 40 milliGRAM(s) SubCutaneous at bedtime  folic acid 1 milliGRAM(s) Oral daily  LORazepam     Tablet 0.5 milliGRAM(s) Oral two times a day  LORazepam     Tablet 1 milliGRAM(s) Oral at bedtime  memantine 5 milliGRAM(s) Oral two times a day  midodrine 10 milliGRAM(s) Oral <User Schedule>  midodrine 5 milliGRAM(s) Oral <User Schedule>  pantoprazole    Tablet 40 milliGRAM(s) Oral before breakfast  senna 2 Tablet(s) Oral at bedtime  sertraline 200 milliGRAM(s) Oral daily    MEDICATIONS  (PRN):  acetaminophen   Tablet .. 650 milliGRAM(s) Oral every 6 hours PRN Mild Pain (1 - 3)  polyethylene glycol 3350 17 Gram(s) Oral daily PRN Constipation    Pertinent Labs:  06-14 Na143 mmol/L Glu 80 mg/dL K+ 3.9 mmol/L Cr  0.87 mg/dL BUN 17 mg/dL 06-11 Phos 3.7 mg/dL 06-14 Alb 3.4 g/dL    Skin: No Pressure Ulcers     Edema: None Noted     Last BM: on 6/12    Estimated Needs:   [X] No Change Since Previous Assessment    Previous Nutrition Diagnosis:   Mild Malnutrition    Nutrition Diagnosis is [X] Ongoing - Supplement Order Pending Verification & Educated Patient on Proper Nutrition     New Nutrition Diagnosis: [X] Not Applicable    Interventions:   1. Educated Patient on Proper Nutrition   2. Ensure Enlive 8oz PO Daily (Provides 350kcal & 20grams of Protein)  3. Recommend Continue Nutrition Plan of Care     Monitoring & Evaluation:   [X] Weights   [X] PO Intake   [X] Follow Up (Per Protocol)  [X] Tolerance to Diet Prescription   [X] Other: Labs     Registered Dietitian/Nutritionist Remains Available.  Tonio Gale RDN    Pager # [phone]  Phone# [phone]

## 2019-06-14 NOTE — PROGRESS NOTE ADULT - ASSESSMENT
This is a 75 y/o female with PMHx advanced Parkinson's disease, orthostatic hypotension and anxiety, recent fall with +right wrist fx, brought in by her  to [facility] on 6/6/19 for ongoing body shaking at home and worsening unsteady gait, found to have e coli ESBL+ UTI and placed on meropenem, being admitted to the Parkinson's program here at [facility] for acute inpatient treatment of Parkinson's disease, gait and ADL dysfunction.

## 2019-06-14 NOTE — PROGRESS NOTE ADULT - SUBJECTIVE AND OBJECTIVE BOX
CHIEF COMPLAINT: no acute events overnight, wrist pain is better controlled better, participates in therapy, anxious to see her . slept better last night.        HISTORY OF PRESENT ILLNESS    This is a 73 y/o female with PMHx advanced Parkinson's disease, orthostatic hypotension and anxiety brought in by her  to [facility] on 6/6/19 for ongoing body shaking at home and worsening unsteady gait. Patient fell 8 days ago and sustained a right wrist fracture that was splinted in the ED. On 6/5/19, per outpatient Neuro recs, Midodrine was increased to 10mg in AM along with continued 5mg in evening for severe orthostasis.  notes patient experiencing body shaking with position changes, is unaware of this and becomes extremely weak afterwards. No prior history of seizures. No head trauma, tongue biting or bowel incontinence. Patient is intermittently incontinent of urine and was treated for ESBL E. coli UTI in April. SInce then has not had recurrent UTI's.     In the ED on the 6th, notable labs included normal CBC, CMP and UA with concerns for pyuria. CT head with brain atrophy, CXR clear. Patient admitted.     During hospitalization at , neurology consulted. EEG done and did not show any epileptiform activity.  Recommended to continue current regimen of Parkinson's medications - Sinemet 25/100 2 tabs 5 times per day, carbidopa 25 mg five times per day, Sinemet CR 50/200 qhs and to continue Midodrine with consideration to increase to 10 mg BID if needed.  Urine culture sent and shows >100, 000 e coli +ESBL. ID consulted and started patient on meropenem 500mg [id number]. Patient to continue meropenem until end of day on 6/13.   Patient medically stable and accepted into the Parkinson's program here at [facility] under Dr Coello's supervision on 6/10/19. (10 Carlos 2019 15:21)        PAST MEDICAL & SURGICAL HISTORY:  Parkinson disease  Anxiety  Detached Retina, Left: and macular hole 5/2011  S/P Cataract Surgery: 2011  Macular Hole: 2010  Retinal Tear: 2003    VITALS  Vital Signs Last 24 Hrs  T(C): 36.8 (14 Jun 2019 07:53), Max: 36.8 (13 Jun 2019 20:41)  T(F): 98.3 (14 Jun 2019 07:53), Max: 98.3 (14 Jun 2019 07:53)  HR: 69 (14 Jun 2019 07:53) (69 - 72)  BP: 153/83 (14 Jun 2019 07:53) (149/81 - 153/83)  BP(mean): --  RR: 14 (14 Jun 2019 07:53) (14 - 16)  SpO2: 95% (14 Jun 2019 07:53) (94% - 95%)      PHYSICAL EXAM  Constitutional - NAD, Comfortable  HEENT - NCAT, EOMI  Neck - Supple, No limited ROM  Chest - CTA bilaterally, No wheeze, No rhonchi, No crackles  Cardiovascular - RRR, S1S2, No murmurs  Abdomen - BS+, Soft, NTND  Extremities - No C/C/E, No calf tenderness   Skin-no rash  Neurologic Exam -  bradykinesia, rigidity are unchanged                   RECENT LABS                        12.3   6.47  )-----------( 208      ( 14 Jun 2019 05:32 )             39.2     06-14    143  |  106  |  17  ----------------------------<  80  3.9   |  30  |  0.87    Ca    9.2      14 Jun 2019 05:32    TPro  6.3  /  Alb  3.4  /  TBili  0.5  /  DBili  x   /  AST  17  /  ALT  11  /  AlkPhos  98  06-14      LIVER FUNCTIONS - ( 14 Jun 2019 05:32 )  Alb: 3.4 g/dL / Pro: 6.3 g/dL / ALK PHOS: 98 U/L / ALT: 11 U/L DA / AST: 17 U/L / GGT: x               CURRENT MEDICATIONS  MEDICATIONS  (STANDING):  buPROPion XL . 300 milliGRAM(s) Oral daily  carbidopa 25 milliGRAM(s) Oral <User Schedule>  carbidopa/levodopa  25/100 2 Tablet(s) Oral <User Schedule>  carbidopa/levodopa CR 50/200 1 Tablet(s) Oral <User Schedule>  docusate sodium 100 milliGRAM(s) Oral two times a day  donepezil 10 milliGRAM(s) Oral at bedtime  enoxaparin Injectable 40 milliGRAM(s) SubCutaneous at bedtime  folic acid 1 milliGRAM(s) Oral daily  LORazepam     Tablet 0.5 milliGRAM(s) Oral two times a day  LORazepam     Tablet 1 milliGRAM(s) Oral at bedtime  memantine 5 milliGRAM(s) Oral two times a day  midodrine 10 milliGRAM(s) Oral <User Schedule>  midodrine 5 milliGRAM(s) Oral <User Schedule>  pantoprazole    Tablet 40 milliGRAM(s) Oral before breakfast  senna 2 Tablet(s) Oral at bedtime  sertraline 200 milliGRAM(s) Oral daily    MEDICATIONS  (PRN):  acetaminophen   Tablet .. 650 milliGRAM(s) Oral every 6 hours PRN Mild Pain (1 - 3)  polyethylene glycol 3350 17 Gram(s) Oral daily PRN Constipation

## 2019-06-14 NOTE — PROGRESS NOTE ADULT - PROBLEM SELECTOR PLAN 1
comprehensive inpatient acute rehab program consisting of PT, OT and ST.   Continue current PD medication regimen.  Monitor orthostatic hypotension.  Fall precautions

## 2019-06-14 NOTE — PROGRESS NOTE ADULT - SUBJECTIVE AND OBJECTIVE BOX
Patient is a 74y old  Female who presents with a chief complaint of falls.  Pt up in chair and ready to start today , pt had a good night without complaints.  Pt would like to go home.       Patient seen and examined at bedside.    ALLERGIES:  No Known Allergies    MEDICATIONS  (STANDING):  buPROPion XL . 300 milliGRAM(s) Oral daily  carbidopa 25 milliGRAM(s) Oral <User Schedule>  carbidopa/levodopa  25/100 2 Tablet(s) Oral <User Schedule>  carbidopa/levodopa CR 50/200 1 Tablet(s) Oral <User Schedule>  docusate sodium 100 milliGRAM(s) Oral two times a day  donepezil 10 milliGRAM(s) Oral at bedtime  enoxaparin Injectable 40 milliGRAM(s) SubCutaneous at bedtime  folic acid 1 milliGRAM(s) Oral daily  LORazepam     Tablet 0.5 milliGRAM(s) Oral two times a day  LORazepam     Tablet 1 milliGRAM(s) Oral at bedtime  memantine 5 milliGRAM(s) Oral two times a day  midodrine 10 milliGRAM(s) Oral <User Schedule>  midodrine 5 milliGRAM(s) Oral <User Schedule>  pantoprazole    Tablet 40 milliGRAM(s) Oral before breakfast  senna 2 Tablet(s) Oral at bedtime  sertraline 200 milliGRAM(s) Oral daily    MEDICATIONS  (PRN):  acetaminophen   Tablet .. 650 milliGRAM(s) Oral every 6 hours PRN Mild Pain (1 - 3)  polyethylene glycol 3350 17 Gram(s) Oral daily PRN Constipation    Vital Signs Last 24 Hrs  T(F): 98.3 (14 Jun 2019 07:53), Max: 98.3 (14 Jun 2019 07:53)  HR: 69 (14 Jun 2019 07:53) (69 - 72)  BP: 153/83 (14 Jun 2019 07:53) (149/81 - 153/83)  RR: 14 (14 Jun 2019 07:53) (14 - 16)  SpO2: 95% (14 Jun 2019 07:53) (94% - 95%)  I&O's Summary    PHYSICAL EXAM:  General: NAD, A/O x 2 forgetful   ENT: MMM  Neck: Supple, No JVD  Lungs: Clear to auscultation bilaterally  Cardio: RRR, S1/S2, No murmurs  Abdomen: Soft, Nontender, Nondistended; Bowel sounds present  Extremities: No calf tenderness, No pitting edema weakness of lower extremities, left wrist in splint due to fracture      LABS:                        12.3   6.47  )-----------( 208      ( 14 Jun 2019 05:32 )             39.2     06-14    143  |  106  |  17  ----------------------------<  80  3.9   |  30  |  0.87    Ca    9.2      14 Jun 2019 05:32    TPro  6.3  /  Alb  3.4  /  TBili  0.5  /  DBili  x   /  AST  17  /  ALT  11  /  AlkPhos  98  06-14    eGFR if Non African American: 67 mL/min/1.73M2 (06-14-19 @ 05:32)  eGFR if African American: 78 mL/min/1.73M2 (06-14-19 @ 05:32)                    CAPILLARY BLOOD GLUCOSE              RADIOLOGY & ADDITIONAL TESTS:    Care Discussed with Consultants/Other Providers:

## 2019-06-15 PROCEDURE — 99232 SBSQ HOSP IP/OBS MODERATE 35: CPT

## 2019-06-15 RX ADMIN — ENOXAPARIN SODIUM 40 MILLIGRAM(S): 100 INJECTION SUBCUTANEOUS at 21:16

## 2019-06-15 RX ADMIN — CARBIDOPA AND LEVODOPA 2 TABLET(S): 25; 100 TABLET ORAL at 12:57

## 2019-06-15 RX ADMIN — MEMANTINE HYDROCHLORIDE 5 MILLIGRAM(S): 10 TABLET ORAL at 19:23

## 2019-06-15 RX ADMIN — CARBIDOPA AND LEVODOPA 1 TABLET(S): 25; 100 TABLET ORAL at 20:02

## 2019-06-15 RX ADMIN — DONEPEZIL HYDROCHLORIDE 10 MILLIGRAM(S): 10 TABLET, FILM COATED ORAL at 21:16

## 2019-06-15 RX ADMIN — BUPROPION HYDROCHLORIDE 300 MILLIGRAM(S): 150 TABLET, EXTENDED RELEASE ORAL at 12:57

## 2019-06-15 RX ADMIN — Medication 1 MILLIGRAM(S): at 21:16

## 2019-06-15 RX ADMIN — CARBIDOPA AND LEVODOPA 2 TABLET(S): 25; 100 TABLET ORAL at 06:59

## 2019-06-15 RX ADMIN — Medication 100 MILLIGRAM(S): at 19:24

## 2019-06-15 RX ADMIN — PANTOPRAZOLE SODIUM 40 MILLIGRAM(S): 20 TABLET, DELAYED RELEASE ORAL at 06:59

## 2019-06-15 RX ADMIN — CARBIDOPA AND LEVODOPA 2 TABLET(S): 25; 100 TABLET ORAL at 09:59

## 2019-06-15 RX ADMIN — Medication 0.5 MILLIGRAM(S): at 19:23

## 2019-06-15 RX ADMIN — SENNA PLUS 2 TABLET(S): 8.6 TABLET ORAL at 21:16

## 2019-06-15 RX ADMIN — Medication 0.5 MILLIGRAM(S): at 05:47

## 2019-06-15 RX ADMIN — Medication 25 MILLIGRAM(S): at 12:57

## 2019-06-15 RX ADMIN — CARBIDOPA AND LEVODOPA 2 TABLET(S): 25; 100 TABLET ORAL at 16:05

## 2019-06-15 RX ADMIN — MEMANTINE HYDROCHLORIDE 5 MILLIGRAM(S): 10 TABLET ORAL at 05:47

## 2019-06-15 RX ADMIN — Medication 1 MILLIGRAM(S): at 12:57

## 2019-06-15 RX ADMIN — Medication 25 MILLIGRAM(S): at 09:59

## 2019-06-15 RX ADMIN — Medication 100 MILLIGRAM(S): at 05:47

## 2019-06-15 RX ADMIN — Medication 25 MILLIGRAM(S): at 16:05

## 2019-06-15 RX ADMIN — Medication 25 MILLIGRAM(S): at 19:21

## 2019-06-15 RX ADMIN — Medication 25 MILLIGRAM(S): at 06:58

## 2019-06-15 RX ADMIN — CARBIDOPA AND LEVODOPA 2 TABLET(S): 25; 100 TABLET ORAL at 19:20

## 2019-06-15 RX ADMIN — SERTRALINE 200 MILLIGRAM(S): 25 TABLET, FILM COATED ORAL at 12:57

## 2019-06-15 NOTE — PROGRESS NOTE ADULT - SUBJECTIVE AND OBJECTIVE BOX
Cc: Gait dysfunction    HPI: Patient with no new medical issues today.  Pain controlled, no chest pain, no N/V, no Fevers/Chills. No other new ROS  Has been tolerating rehabilitation program.    acetaminophen   Tablet .. 650 milliGRAM(s) Oral every 6 hours PRN  buPROPion XL . 300 milliGRAM(s) Oral daily  carbidopa 25 milliGRAM(s) Oral <User Schedule>  carbidopa/levodopa  25/100 2 Tablet(s) Oral <User Schedule>  carbidopa/levodopa CR 50/200 1 Tablet(s) Oral <User Schedule>  docusate sodium 100 milliGRAM(s) Oral two times a day  donepezil 10 milliGRAM(s) Oral at bedtime  enoxaparin Injectable 40 milliGRAM(s) SubCutaneous at bedtime  folic acid 1 milliGRAM(s) Oral daily  LORazepam     Tablet 0.5 milliGRAM(s) Oral two times a day  LORazepam     Tablet 1 milliGRAM(s) Oral at bedtime  memantine 5 milliGRAM(s) Oral two times a day  midodrine 10 milliGRAM(s) Oral <User Schedule>  midodrine 5 milliGRAM(s) Oral <User Schedule>  pantoprazole    Tablet 40 milliGRAM(s) Oral before breakfast  polyethylene glycol 3350 17 Gram(s) Oral daily PRN  senna 2 Tablet(s) Oral at bedtime  sertraline 200 milliGRAM(s) Oral daily      T(C): 36.4 (06-15-19 @ 08:05), Max: 36.9 (06-14-19 @ 20:52)  HR: 66 (06-15-19 @ 08:05) (66 - 71)  BP: 148/77 (06-15-19 @ 08:05) (148/77 - 162/79)  RR: 14 (06-15-19 @ 08:05) (14 - 14)  SpO2: 94% (06-15-19 @ 08:05) (94% - 94%)    In NAD  HEENT- EOMI  Heart- RRR, S1S2  Lungs- CTA bl.  Abd- + BS, NT  Ext- No calf pain  Neuro- Exam unchanged    Imp: Patient with diagnosis of Parkinson's disease, UTI admitted for comprehensive acute rehabilitation.    Plan:  - Continue PT/OT/SLP  - DVT prophylaxis- Lovenox  - Skin- Turn q2h, check skin daily  - Continue current medications; patient medically stable.   - Patient is stable to continue current rehabilitation program.

## 2019-06-16 PROCEDURE — 99232 SBSQ HOSP IP/OBS MODERATE 35: CPT

## 2019-06-16 RX ADMIN — SENNA PLUS 2 TABLET(S): 8.6 TABLET ORAL at 21:26

## 2019-06-16 RX ADMIN — Medication 1 MILLIGRAM(S): at 13:20

## 2019-06-16 RX ADMIN — CARBIDOPA AND LEVODOPA 2 TABLET(S): 25; 100 TABLET ORAL at 06:49

## 2019-06-16 RX ADMIN — MEMANTINE HYDROCHLORIDE 5 MILLIGRAM(S): 10 TABLET ORAL at 17:41

## 2019-06-16 RX ADMIN — CARBIDOPA AND LEVODOPA 2 TABLET(S): 25; 100 TABLET ORAL at 13:20

## 2019-06-16 RX ADMIN — CARBIDOPA AND LEVODOPA 2 TABLET(S): 25; 100 TABLET ORAL at 16:07

## 2019-06-16 RX ADMIN — MEMANTINE HYDROCHLORIDE 5 MILLIGRAM(S): 10 TABLET ORAL at 06:14

## 2019-06-16 RX ADMIN — Medication 0.5 MILLIGRAM(S): at 06:14

## 2019-06-16 RX ADMIN — Medication 100 MILLIGRAM(S): at 06:14

## 2019-06-16 RX ADMIN — Medication 25 MILLIGRAM(S): at 13:20

## 2019-06-16 RX ADMIN — CARBIDOPA AND LEVODOPA 2 TABLET(S): 25; 100 TABLET ORAL at 19:07

## 2019-06-16 RX ADMIN — PANTOPRAZOLE SODIUM 40 MILLIGRAM(S): 20 TABLET, DELAYED RELEASE ORAL at 06:14

## 2019-06-16 RX ADMIN — ENOXAPARIN SODIUM 40 MILLIGRAM(S): 100 INJECTION SUBCUTANEOUS at 21:26

## 2019-06-16 RX ADMIN — Medication 100 MILLIGRAM(S): at 17:41

## 2019-06-16 RX ADMIN — Medication 1 MILLIGRAM(S): at 21:26

## 2019-06-16 RX ADMIN — CARBIDOPA AND LEVODOPA 1 TABLET(S): 25; 100 TABLET ORAL at 20:00

## 2019-06-16 RX ADMIN — Medication 25 MILLIGRAM(S): at 10:00

## 2019-06-16 RX ADMIN — DONEPEZIL HYDROCHLORIDE 10 MILLIGRAM(S): 10 TABLET, FILM COATED ORAL at 21:26

## 2019-06-16 RX ADMIN — Medication 25 MILLIGRAM(S): at 16:07

## 2019-06-16 RX ADMIN — SERTRALINE 200 MILLIGRAM(S): 25 TABLET, FILM COATED ORAL at 13:20

## 2019-06-16 RX ADMIN — BUPROPION HYDROCHLORIDE 300 MILLIGRAM(S): 150 TABLET, EXTENDED RELEASE ORAL at 11:29

## 2019-06-16 RX ADMIN — Medication 0.5 MILLIGRAM(S): at 17:41

## 2019-06-16 RX ADMIN — Medication 25 MILLIGRAM(S): at 19:07

## 2019-06-16 RX ADMIN — Medication 25 MILLIGRAM(S): at 06:49

## 2019-06-16 RX ADMIN — CARBIDOPA AND LEVODOPA 2 TABLET(S): 25; 100 TABLET ORAL at 10:00

## 2019-06-16 NOTE — PROGRESS NOTE ADULT - SUBJECTIVE AND OBJECTIVE BOX
HPI:  This is a 73 y/o female with PMHx advanced Parkinson's disease, orthostatic hypotension and anxiety brought in by her  to [facility] on 6/6/19 for ongoing body shaking at home and worsening unsteady gait. Patient fell 8 days ago and sustained a right wrist fracture that was splinted in the ED. On 6/5/19, per outpatient Neuro recs, Midodrine was increased to 10mg in AM along with continued 5mg in evening for severe orthostasis.  notes patient experiencing body shaking with position changes, is unaware of this and becomes extremely weak afterwards. No prior history of seizures. No head trauma, tongue biting or bowel incontinence. Patient is intermittently incontinent of urine and was treated for ESBL E. coli UTI in April. SInce then has not had recurrent UTI's.     In the ED on the 6th, notable labs included normal CBC, CMP and UA with concerns for pyuria. CT head with brain atrophy, CXR clear. Patient admitted.     During hospitalization at , neurology consulted. EEG done and did not show any epileptiform activity.  Recommended to continue current regimen of Parkinson's medications - Sinemet 25/100 2 tabs 5 times per day, carbidopa 25 mg five times per day, Sinemet CR 50/200 qhs and to continue Midodrine with consideration to increase to 10 mg BID if needed.  Urine culture sent and shows >100, 000 e coli +ESBL. ID consulted and started patient on meropenem 500mg [id number]. Patient to continue meropenem until end of day on 6/13.   Patient medically stable and [facility]ello[facility]/10/[facility]. (10 Carlos 2019 15:21)      Subjective  "I wanna go home"  Asking for         PAST MEDICAL & SURGICAL HISTORY:  Parkinson disease  Anxiety  Detached Retina, Left: and macular hole 5/2011  S/P Cataract Surgery: 2011  Macular Hole: 2010  Retinal Tear: 2003      MedsMEDICATIONS  (STANDING):  buPROPion XL . 300 milliGRAM(s) Oral daily  carbidopa 25 milliGRAM(s) Oral <User Schedule>  carbidopa/levodopa  25/100 2 Tablet(s) Oral <User Schedule>  carbidopa/levodopa CR 50/200 1 Tablet(s) Oral <User Schedule>  docusate sodium 100 milliGRAM(s) Oral two times a day  donepezil 10 milliGRAM(s) Oral at bedtime  enoxaparin Injectable 40 milliGRAM(s) SubCutaneous at bedtime  folic acid 1 milliGRAM(s) Oral daily  LORazepam     Tablet 1 milliGRAM(s) Oral at bedtime  LORazepam     Tablet 0.5 milliGRAM(s) Oral two times a day  memantine 5 milliGRAM(s) Oral two times a day  midodrine 10 milliGRAM(s) Oral <User Schedule>  midodrine 5 milliGRAM(s) Oral <User Schedule>  pantoprazole    Tablet 40 milliGRAM(s) Oral before breakfast  senna 2 Tablet(s) Oral at bedtime  sertraline 200 milliGRAM(s) Oral daily    MEDICATIONS  (PRN):  acetaminophen   Tablet .. 650 milliGRAM(s) Oral every 6 hours PRN Mild Pain (1 - 3)  polyethylene glycol 3350 17 Gram(s) Oral daily PRN Constipation      Vital Signs Last 24 Hrs  T(C): 36.7 (16 Jun 2019 07:59), Max: 36.7 (16 Jun 2019 07:59)  T(F): 98.1 (16 Jun 2019 07:59), Max: 98.1 (16 Jun 2019 07:59)  HR: 73 (16 Jun 2019 07:59) (73 - 82)  BP: 143/85 (16 Jun 2019 07:59) (143/85 - 147/82)  BP(mean): --  RR: 14 (16 Jun 2019 07:59) (14 - 14)  SpO2: 93% (16 Jun 2019 07:59) (92% - 93%)  I&O's Summary    15 Carlos 2019 07:01  -  16 Jun 2019 07:00  --------------------------------------------------------  IN: 540 mL / OUT: 0 mL / NET: 540 mL    16 Jun 2019 07:01  -  16 Jun 2019 11:51  --------------------------------------------------------  IN: 240 mL / OUT: 0 mL / NET: 240 mL        PHYSICAL EXAM:  GENERAL: NAD  NECK: Supple  NERVOUS SYSTEM:  awake and alert  HEART: S1s2 NL , RRR  CHEST/LUNG: Clear to percussion bilaterally  ABDOMEN: Soft, Nontender, Nondistended; Bowel sounds present  EXTREMITIES:  No edema, Right wrist splint      LABS:  |06-14-19 @ 05:32            12.3  6.47>--------------<208            39.2      143  |  106  |  17  --------------------------<80  3.9  |  9.2  |  0.87    Mg -- / Phos--    PT -- / INR --    PTT --    Lipase --  06-14-19 @ 05:32    Imaging Personally Reviewed:  [ ] YES  [ ] NO      HEALTH ISSUES - PROBLEM Dx:    Parkinson/fall  PT/OT per reahb  Carb/levo    UTI  s/p meropenem    Orthostatic hypotension  Cont Midodrine    DVT ppx  Lovenox        Care Discussed with Consultants/Other Providers [ x] YES  [ ] NO

## 2019-06-16 NOTE — PROGRESS NOTE ADULT - SUBJECTIVE AND OBJECTIVE BOX
Cc: Gait dysfunction    HPI: Patient with no new medical issues today.  Pain controlled, no chest pain, no N/V, no Fevers/Chills. No other new ROS  Has been tolerating rehabilitation program.    MEDICATIONS  (STANDING):  buPROPion XL . 300 milliGRAM(s) Oral daily  carbidopa 25 milliGRAM(s) Oral <User Schedule>  carbidopa/levodopa  25/100 2 Tablet(s) Oral <User Schedule>  carbidopa/levodopa CR 50/200 1 Tablet(s) Oral <User Schedule>  docusate sodium 100 milliGRAM(s) Oral two times a day  donepezil 10 milliGRAM(s) Oral at bedtime  enoxaparin Injectable 40 milliGRAM(s) SubCutaneous at bedtime  folic acid 1 milliGRAM(s) Oral daily  LORazepam     Tablet 0.5 milliGRAM(s) Oral two times a day  LORazepam     Tablet 1 milliGRAM(s) Oral at bedtime  memantine 5 milliGRAM(s) Oral two times a day  midodrine 10 milliGRAM(s) Oral <User Schedule>  midodrine 5 milliGRAM(s) Oral <User Schedule>  pantoprazole    Tablet 40 milliGRAM(s) Oral before breakfast  senna 2 Tablet(s) Oral at bedtime  sertraline 200 milliGRAM(s) Oral daily    MEDICATIONS  (PRN):  acetaminophen   Tablet .. 650 milliGRAM(s) Oral every 6 hours PRN Mild Pain (1 - 3)  polyethylene glycol 3350 17 Gram(s) Oral daily PRN Constipation    Vital Signs Last 24 Hrs  T(C): 36.6 (15 Carlos 2019 19:33), Max: 36.6 (15 Carlos 2019 19:33)  T(F): 97.9 (15 Carlos 2019 19:33), Max: 97.9 (15 Carlos 2019 19:33)  HR: 82 (15 Carlos 2019 19:33) (66 - 82)  BP: 147/82 (15 Carlos 2019 19:33) (147/82 - 148/77)  BP(mean): --  RR: 14 (15 Carlos 2019 19:33) (14 - 14)  SpO2: 92% (15 Carlos 2019 19:33) (92% - 94%)    In NAD  HEENT- EOMI  Heart- RRR, S1S2  Lungs- CTA bl.  Abd- + BS, NT  Ext- No calf pain  Neuro- Exam unchanged    Imp: Patient with diagnosis of Parkinson's disease, UTI admitted for comprehensive acute rehabilitation.    Plan:  - Continue PT/OT/SLP  - DVT prophylaxis- Lovenox  - Skin- Turn q2h, check skin daily  - Continue current medications; patient medically stable.   - Patient is stable to continue current rehabilitation program.

## 2019-06-17 LAB
ALBUMIN SERPL ELPH-MCNC: 3.5 G/DL — SIGNIFICANT CHANGE UP (ref 3.3–5)
ALP SERPL-CCNC: 103 U/L — SIGNIFICANT CHANGE UP (ref 40–120)
ALT FLD-CCNC: 13 U/L DA — SIGNIFICANT CHANGE UP (ref 10–45)
ANION GAP SERPL CALC-SCNC: 6 MMOL/L — SIGNIFICANT CHANGE UP (ref 5–17)
AST SERPL-CCNC: 20 U/L — SIGNIFICANT CHANGE UP (ref 10–40)
BILIRUB SERPL-MCNC: 0.4 MG/DL — SIGNIFICANT CHANGE UP (ref 0.2–1.2)
BUN SERPL-MCNC: 17 MG/DL — SIGNIFICANT CHANGE UP (ref 7–23)
CALCIUM SERPL-MCNC: 9.4 MG/DL — SIGNIFICANT CHANGE UP (ref 8.4–10.5)
CHLORIDE SERPL-SCNC: 105 MMOL/L — SIGNIFICANT CHANGE UP (ref 96–108)
CO2 SERPL-SCNC: 30 MMOL/L — SIGNIFICANT CHANGE UP (ref 22–31)
CREAT SERPL-MCNC: 0.82 MG/DL — SIGNIFICANT CHANGE UP (ref 0.5–1.3)
GLUCOSE SERPL-MCNC: 98 MG/DL — SIGNIFICANT CHANGE UP (ref 70–99)
HCT VFR BLD CALC: 40.5 % — SIGNIFICANT CHANGE UP (ref 34.5–45)
HGB BLD-MCNC: 13.1 G/DL — SIGNIFICANT CHANGE UP (ref 11.5–15.5)
MCHC RBC-ENTMCNC: 30.2 PG — SIGNIFICANT CHANGE UP (ref 27–34)
MCHC RBC-ENTMCNC: 32.3 GM/DL — SIGNIFICANT CHANGE UP (ref 32–36)
MCV RBC AUTO: 93.3 FL — SIGNIFICANT CHANGE UP (ref 80–100)
NRBC # BLD: 0 /100 WBCS — SIGNIFICANT CHANGE UP (ref 0–0)
PLATELET # BLD AUTO: 214 K/UL — SIGNIFICANT CHANGE UP (ref 150–400)
POTASSIUM SERPL-MCNC: 4 MMOL/L — SIGNIFICANT CHANGE UP (ref 3.5–5.3)
POTASSIUM SERPL-SCNC: 4 MMOL/L — SIGNIFICANT CHANGE UP (ref 3.5–5.3)
PROT SERPL-MCNC: 6.4 G/DL — SIGNIFICANT CHANGE UP (ref 6–8.3)
RBC # BLD: 4.34 M/UL — SIGNIFICANT CHANGE UP (ref 3.8–5.2)
RBC # FLD: 13.2 % — SIGNIFICANT CHANGE UP (ref 10.3–14.5)
SODIUM SERPL-SCNC: 141 MMOL/L — SIGNIFICANT CHANGE UP (ref 135–145)
WBC # BLD: 5.75 K/UL — SIGNIFICANT CHANGE UP (ref 3.8–10.5)
WBC # FLD AUTO: 5.75 K/UL — SIGNIFICANT CHANGE UP (ref 3.8–10.5)

## 2019-06-17 PROCEDURE — 99232 SBSQ HOSP IP/OBS MODERATE 35: CPT

## 2019-06-17 PROCEDURE — 96116 NUBHVL XM PHYS/QHP 1ST HR: CPT

## 2019-06-17 RX ADMIN — Medication 25 MILLIGRAM(S): at 13:01

## 2019-06-17 RX ADMIN — Medication 1 MILLIGRAM(S): at 11:56

## 2019-06-17 RX ADMIN — CARBIDOPA AND LEVODOPA 2 TABLET(S): 25; 100 TABLET ORAL at 10:06

## 2019-06-17 RX ADMIN — CARBIDOPA AND LEVODOPA 2 TABLET(S): 25; 100 TABLET ORAL at 19:01

## 2019-06-17 RX ADMIN — Medication 100 MILLIGRAM(S): at 06:04

## 2019-06-17 RX ADMIN — MEMANTINE HYDROCHLORIDE 5 MILLIGRAM(S): 10 TABLET ORAL at 06:04

## 2019-06-17 RX ADMIN — Medication 0.5 MILLIGRAM(S): at 06:04

## 2019-06-17 RX ADMIN — CARBIDOPA AND LEVODOPA 2 TABLET(S): 25; 100 TABLET ORAL at 07:00

## 2019-06-17 RX ADMIN — BUPROPION HYDROCHLORIDE 300 MILLIGRAM(S): 150 TABLET, EXTENDED RELEASE ORAL at 11:56

## 2019-06-17 RX ADMIN — Medication 1 MILLIGRAM(S): at 21:23

## 2019-06-17 RX ADMIN — Medication 25 MILLIGRAM(S): at 10:06

## 2019-06-17 RX ADMIN — DONEPEZIL HYDROCHLORIDE 10 MILLIGRAM(S): 10 TABLET, FILM COATED ORAL at 21:23

## 2019-06-17 RX ADMIN — SENNA PLUS 2 TABLET(S): 8.6 TABLET ORAL at 21:24

## 2019-06-17 RX ADMIN — Medication 25 MILLIGRAM(S): at 19:01

## 2019-06-17 RX ADMIN — Medication 25 MILLIGRAM(S): at 16:41

## 2019-06-17 RX ADMIN — Medication 0.5 MILLIGRAM(S): at 18:19

## 2019-06-17 RX ADMIN — ENOXAPARIN SODIUM 40 MILLIGRAM(S): 100 INJECTION SUBCUTANEOUS at 21:23

## 2019-06-17 RX ADMIN — Medication 100 MILLIGRAM(S): at 18:19

## 2019-06-17 RX ADMIN — Medication 25 MILLIGRAM(S): at 07:00

## 2019-06-17 RX ADMIN — PANTOPRAZOLE SODIUM 40 MILLIGRAM(S): 20 TABLET, DELAYED RELEASE ORAL at 06:05

## 2019-06-17 RX ADMIN — CARBIDOPA AND LEVODOPA 2 TABLET(S): 25; 100 TABLET ORAL at 13:01

## 2019-06-17 RX ADMIN — SERTRALINE 200 MILLIGRAM(S): 25 TABLET, FILM COATED ORAL at 11:57

## 2019-06-17 RX ADMIN — CARBIDOPA AND LEVODOPA 2 TABLET(S): 25; 100 TABLET ORAL at 16:41

## 2019-06-17 RX ADMIN — MEMANTINE HYDROCHLORIDE 5 MILLIGRAM(S): 10 TABLET ORAL at 18:20

## 2019-06-17 RX ADMIN — CARBIDOPA AND LEVODOPA 1 TABLET(S): 25; 100 TABLET ORAL at 19:47

## 2019-06-17 NOTE — PROGRESS NOTE ADULT - ASSESSMENT
75 y/o female with PMHx advanced Parkinson's disease, orthostatic hypotension, and anxiety, recent fall with +right wrist fracture, brought in by her  to [facility] on 6/6/2019 for ongoing body shaking at home and worsening unsteady gait, found to have e coli ESBL+ UTI and placed on meropenem, being admitted to the Parkinson's program here at [facility] for acute inpatient treatment of Parkinson's disease, gait and ADL dysfunction.     > Parkinson's disease  - c/w carbidopa/levodopa per neurology  - comprehensive PT/OT/ rehab/ SLP program, Parkinson's program    >E. coli ESBL+ UTI  -off antibiotics, afebrile, no leukocytosis     > Right UE  wrist fracture  - c/w splint, pain control  - NWB per Orthopedic eval     > Orthostatic hypotension  - c/w midodrine BP improved - wnl      > Anxiety  - c/w LORazepam     Tablet 0.5 milliGRAM(s) Oral two times a day, buPROPion XL . 300 milliGRAM(s) Oral daily, sertraline 200 milliGRAM(s) Oral daily  - f/u psychiatry    > Constipation   - c/w docusate sodium 100 milliGRAM(s) Oral two times a day, senna 2 Tablet(s) Oral at bedtime    > Dementia   - c/w donepezil 10 milliGRAM(s) Oral at bedtime, memantine 5 milliGRAM(s) Oral two times a day,   folic acid 1 milliGRAM(s) Oral daily  - frequent re-orientation    > GERD  - c/w pantoprazole    Tablet 40 milliGRAM(s) Oral before breakfast    > DVT ppx:  - enoxaparin Injectable 40 milliGRAM(s) SubCutaneous at bedtime

## 2019-06-17 NOTE — PROGRESS NOTE ADULT - SUBJECTIVE AND OBJECTIVE BOX
CHIEF COMPLAINT: Offers no complaints.       HISTORY OF PRESENT ILLNESS  This is a 75 y/o female with PMHx advanced Parkinson's disease, orthostatic hypotension and anxiety brought in by her  to [facility] on 6/6/19 for ongoing body shaking at home and worsening unsteady gait. Patient fell 8 days ago and sustained a right wrist fracture that was splinted in the ED. On 6/5/19, per outpatient Neuro recs, Midodrine was increased to 10mg in AM along with continued 5mg in evening for severe orthostasis.  notes patient experiencing body shaking with position changes, is unaware of this and becomes extremely weak afterwards. No prior history of seizures. No head trauma, tongue biting or bowel incontinence. Patient is intermittently incontinent of urine and was treated for ESBL E. coli UTI in April. SInce then has not had recurrent UTI's.     In the ED on the 6th, notable labs included normal CBC, CMP and UA with concerns for pyuria. CT head with brain atrophy, CXR clear. Patient admitted.     During hospitalization at , neurology consulted. EEG done and did not show any epileptiform activity.  Recommended to continue current regimen of Parkinson's medications - Sinemet 25/100 2 tabs 5 times per day, carbidopa 25 mg five times per day, Sinemet CR 50/200 qhs and to continue Midodrine with consideration to increase to 10 mg BID if needed.  Urine culture sent and shows >100, 000 e coli +ESBL. ID consulted and started patient on meropenem 500mg [id number]. Patient to continue meropenem until end of day on 6/13.   Patient medically stable and accepted into the Parkinson's program here at [facility] under Dr Coello's supervision on 6/10/19.      PAST MEDICAL & SURGICAL HISTORY:  Parkinson disease  Anxiety  Detached Retina, Left: and macular hole 5/2011  S/P Cataract Surgery: 2011  Macular Hole: 2010  Retinal Tear: 2003       REVIEW OF SYMPTOMS  [X] Constitutional WNL     [X] Cardio WNL            [X] Resp WNL           [X] GI WNL                          [X]  WNL                   [X] Heme WNL              [X] Endo WNL                     [X] Skin WNL                       VITALS  Vital Signs Last 24 Hrs  T(C): 36.8 (17 Jun 2019 08:24), Max: 36.8 (16 Jun 2019 20:09)  T(F): 98.2 (17 Jun 2019 08:24), Max: 98.3 (16 Jun 2019 20:09)  HR: 81 (17 Jun 2019 08:24) (76 - 81)  BP: 112/78 (17 Jun 2019 08:24) (112/78 - 135/80)  BP(mean): --  RR: 14 (17 Jun 2019 08:24) (14 - 16)  SpO2: 94% (17 Jun 2019 08:24) (94% - 94%)    PHYSICAL EXAM  Constitutional - NAD, Comfortable  HEENT - NCAT, EOMI  Neck - Supple, No limited ROM  Chest - CTA bilaterally, No wheeze, No rhonchi, No crackles  Cardiovascular - RRR, S1S2, No murmurs  Abdomen - BS+, Soft, NTND  Extremities - No C/C/E, No calf tenderness   Skin-no rash    Neurologic Exam -  bradykinesia, rigidity are unchanged           FUNCTIONAL PROGRESS  Gait - 35ft max A  ADLs - max A  Transfers - max A  Functional transfer - max A    RECENT LABS                        13.1   5.75  )-----------( 214      ( 17 Jun 2019 05:30 )             40.5     06-17    141  |  105  |  17  ----------------------------<  98  4.0   |  30  |  0.82    Ca    9.4      17 Jun 2019 05:30    TPro  6.4  /  Alb  3.5  /  TBili  0.4  /  DBili  x   /  AST  20  /  ALT  13  /  AlkPhos  103  06-17      LIVER FUNCTIONS - ( 17 Jun 2019 05:30 )  Alb: 3.5 g/dL / Pro: 6.4 g/dL / ALK PHOS: 103 U/L / ALT: 13 U/L DA / AST: 20 U/L / GGT: x                           RADIOLOGY/OTHER RESULTS      CURRENT MEDICATIONS  MEDICATIONS  (STANDING):  buPROPion XL . 300 milliGRAM(s) Oral daily  carbidopa 25 milliGRAM(s) Oral <User Schedule>  carbidopa/levodopa  25/100 2 Tablet(s) Oral <User Schedule>  carbidopa/levodopa CR 50/200 1 Tablet(s) Oral <User Schedule>  docusate sodium 100 milliGRAM(s) Oral two times a day  donepezil 10 milliGRAM(s) Oral at bedtime  enoxaparin Injectable 40 milliGRAM(s) SubCutaneous at bedtime  folic acid 1 milliGRAM(s) Oral daily  LORazepam     Tablet 1 milliGRAM(s) Oral at bedtime  LORazepam     Tablet 0.5 milliGRAM(s) Oral two times a day  memantine 5 milliGRAM(s) Oral two times a day  midodrine 10 milliGRAM(s) Oral <User Schedule>  midodrine 5 milliGRAM(s) Oral <User Schedule>  pantoprazole    Tablet 40 milliGRAM(s) Oral before breakfast  senna 2 Tablet(s) Oral at bedtime  sertraline 200 milliGRAM(s) Oral daily    MEDICATIONS  (PRN):  acetaminophen   Tablet .. 650 milliGRAM(s) Oral every 6 hours PRN Mild Pain (1 - 3)  polyethylene glycol 3350 17 Gram(s) Oral daily PRN Constipation      ASSESSMENT & PLAN          GI/Bowel Management - Colace   Management - Toilet Q2  Skin - Turn Q2  Pain - Tylenol PRN  DVT PPX - Lovenox  Diet - reg c thins    Continue comprehensive acute rehab program consisting of 3hrs/day of OT/PT and SLP.

## 2019-06-17 NOTE — PROGRESS NOTE ADULT - ASSESSMENT
This is a 73 y/o female with PMHx advanced Parkinson's disease, orthostatic hypotension and anxiety, recent fall with +right wrist fx, brought in by her  to [facility] on 6/6/19 for ongoing body shaking at home and worsening unsteady gait, found to have e coli ESBL+ UTI and placed on meropenem, being admitted to the Parkinson's program here at [facility] for acute inpatient treatment of Parkinson's disease, gait and ADL dysfunction.

## 2019-06-17 NOTE — PROGRESS NOTE ADULT - PROBLEM SELECTOR PLAN 1
comprehensive inpatient acute rehab program consisting of PT, OT and ST.   Continue current PD medication regimen.  Monitor orthostatic hypotension. Midodrine with parameters. Fall precautions comprehensive inpatient acute rehab program consisting of PT, OT and ST.   Continue current PD medication regimen.  Monitor orthostatic hypotension. Midodrine with parameters. 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

## 2019-06-17 NOTE — PROGRESS NOTE ADULT - SUBJECTIVE AND OBJECTIVE BOX
Patient is a 74y old  Female who presents with a chief complaint of PD c cognitive and functional deficits (17 Jun 2019 09:02)      Patient seen and examined at bedside, no events overnight, in no acute distress.     ALLERGIES:  No Known Allergies    MEDICATIONS:  carbidopa 25 milliGRAM(s) Oral <User Schedule>  LORazepam     Tablet 1 milliGRAM(s) Oral at bedtime  LORazepam     Tablet 0.5 milliGRAM(s) Oral two times a day  polyethylene glycol 3350 17 Gram(s) Oral daily PRN    Vital Signs Last 24 Hrs  T(C): 36.8 (17 Jun 2019 08:24), Max: 36.8 (16 Jun 2019 20:09)  T(F): 98.2 (17 Jun 2019 08:24), Max: 98.3 (16 Jun 2019 20:09)  HR: 81 (17 Jun 2019 08:24) (76 - 81)  BP: 112/78 (17 Jun 2019 08:24) (112/78 - 135/80)  BP(mean): --  RR: 14 (17 Jun 2019 08:24) (14 - 16)  SpO2: 94% (17 Jun 2019 08:24) (94% - 94%)  I&O's Summary    16 Jun 2019 07:01  -  17 Jun 2019 07:00  --------------------------------------------------------  IN: 440 mL / OUT: 0 mL / NET: 440 mL    PAST MEDICAL & SURGICAL HISTORY:  Parkinson disease  Anxiety  Detached Retina, Left: and macular hole 5/2011  S/P Cataract Surgery: 2011  Macular Hole: 2010  Retinal Tear: 2003      Home Medications:  buPROPion 300 mg/24 hours (XL) oral tablet, extended release: 1 tab(s) orally every 24 hours (06 Jun 2019 14:54)  carbidopa 25 mg oral tablet: 1 tab(s) orally 5 times a day  ***7am,10am,1pm,4pm,7pm**** (06 Jun 2019 14:54)  carbidopa-levodopa 25 mg-100 mg oral tablet: 2 tab(s) orally 5 times a day  7am,10,1,4,7pm (06 Jun 2019 14:54)  carbidopa-levodopa 50 mg-200 mg oral tablet, extended release: 1 tab(s) orally once a day (at bedtime) (06 Jun 2019 14:54)  docusate sodium 100 mg oral capsule: 1 cap(s) orally once a day (at bedtime), As needed, Constipation (06 Jun 2019 14:54)  donepezil 10 mg oral tablet: 1 tab(s) orally once a day (at bedtime) (06 Jun 2019 14:54)  folic acid 1 mg oral tablet: 1 tab(s) orally once a day (06 Jun 2019 14:54)  LORazepam 0.5 mg oral tablet: 1 tab(s) orally 2 times a day (06 Jun 2019 14:54)  magnesium hydroxide 8% oral suspension: 30 milliliter(s) orally once a day, As needed, Constipation (06 Jun 2019 14:54)  memantine 5 mg oral tablet: 1 tab(s) orally 2 times a day (06 Jun 2019 14:54)  meropenem 500 mg intravenous injection: 500 milligram(s) intravenous every 8 hours (10 Carlos 2019 12:59)  midodrine 5 mg oral tablet: 2 tab(s) orally in AM then 1 tab in PM.   hold for SBP&gt;110 (06 Jun 2019 15:11)  senna oral tablet: 2 tab(s) orally once a day (at bedtime), As needed, Constipation (06 Jun 2019 14:54)  Zoloft 100 mg oral tablet: 2 tab(s) orally once a day (06 Jun 2019 14:54)      PHYSICAL EXAM:  General: NAD  ENT: MMM  Neck: Supple, No JVD  Lungs: Clear to percussion bilaterally  Cardio: RRR, S1/S2, No murmurs  Abdomen: Soft, Nontender, Nondistended; Bowel sounds present  Neuro: no new deficits   Extremities: No clubbing, cyanosis, or edema    LABS:                        13.1   5.75  )-----------( 214      ( 17 Jun 2019 05:30 )             40.5     06-17    141  |  105  |  17  ----------------------------<  98  4.0   |  30  |  0.82    Ca    9.4      17 Jun 2019 05:30    TPro  6.4  /  Alb  3.5  /  TBili  0.4  /  DBili  x   /  AST  20  /  ALT  13  /  AlkPhos  103  06-17      RADIOLOGY & ADDITIONAL TESTS: no new tests     Care Discussed with Consultants/Other Providers: PM&R team

## 2019-06-18 PROCEDURE — 99233 SBSQ HOSP IP/OBS HIGH 50: CPT

## 2019-06-18 PROCEDURE — 99232 SBSQ HOSP IP/OBS MODERATE 35: CPT

## 2019-06-18 RX ORDER — ONDANSETRON 8 MG/1
4 TABLET, FILM COATED ORAL EVERY 6 HOURS
Refills: 0 | Status: DISCONTINUED | OUTPATIENT
Start: 2019-06-18 | End: 2019-06-27

## 2019-06-18 RX ADMIN — CARBIDOPA AND LEVODOPA 2 TABLET(S): 25; 100 TABLET ORAL at 16:24

## 2019-06-18 RX ADMIN — Medication 25 MILLIGRAM(S): at 16:24

## 2019-06-18 RX ADMIN — MEMANTINE HYDROCHLORIDE 5 MILLIGRAM(S): 10 TABLET ORAL at 17:22

## 2019-06-18 RX ADMIN — SENNA PLUS 2 TABLET(S): 8.6 TABLET ORAL at 21:58

## 2019-06-18 RX ADMIN — Medication 25 MILLIGRAM(S): at 06:52

## 2019-06-18 RX ADMIN — Medication 1 MILLIGRAM(S): at 21:58

## 2019-06-18 RX ADMIN — MIDODRINE HYDROCHLORIDE 10 MILLIGRAM(S): 2.5 TABLET ORAL at 08:26

## 2019-06-18 RX ADMIN — Medication 100 MILLIGRAM(S): at 17:22

## 2019-06-18 RX ADMIN — Medication 0.5 MILLIGRAM(S): at 06:08

## 2019-06-18 RX ADMIN — CARBIDOPA AND LEVODOPA 1 TABLET(S): 25; 100 TABLET ORAL at 20:02

## 2019-06-18 RX ADMIN — CARBIDOPA AND LEVODOPA 2 TABLET(S): 25; 100 TABLET ORAL at 10:22

## 2019-06-18 RX ADMIN — Medication 25 MILLIGRAM(S): at 19:02

## 2019-06-18 RX ADMIN — CARBIDOPA AND LEVODOPA 2 TABLET(S): 25; 100 TABLET ORAL at 13:10

## 2019-06-18 RX ADMIN — CARBIDOPA AND LEVODOPA 2 TABLET(S): 25; 100 TABLET ORAL at 06:52

## 2019-06-18 RX ADMIN — CARBIDOPA AND LEVODOPA 2 TABLET(S): 25; 100 TABLET ORAL at 19:02

## 2019-06-18 RX ADMIN — ENOXAPARIN SODIUM 40 MILLIGRAM(S): 100 INJECTION SUBCUTANEOUS at 21:58

## 2019-06-18 RX ADMIN — BUPROPION HYDROCHLORIDE 300 MILLIGRAM(S): 150 TABLET, EXTENDED RELEASE ORAL at 12:43

## 2019-06-18 RX ADMIN — Medication 1 MILLIGRAM(S): at 12:43

## 2019-06-18 RX ADMIN — Medication 25 MILLIGRAM(S): at 10:22

## 2019-06-18 RX ADMIN — Medication 100 MILLIGRAM(S): at 06:07

## 2019-06-18 RX ADMIN — DONEPEZIL HYDROCHLORIDE 10 MILLIGRAM(S): 10 TABLET, FILM COATED ORAL at 21:58

## 2019-06-18 RX ADMIN — Medication 25 MILLIGRAM(S): at 13:09

## 2019-06-18 RX ADMIN — Medication 0.5 MILLIGRAM(S): at 17:22

## 2019-06-18 RX ADMIN — MIDODRINE HYDROCHLORIDE 5 MILLIGRAM(S): 2.5 TABLET ORAL at 17:22

## 2019-06-18 RX ADMIN — PANTOPRAZOLE SODIUM 40 MILLIGRAM(S): 20 TABLET, DELAYED RELEASE ORAL at 06:08

## 2019-06-18 RX ADMIN — MEMANTINE HYDROCHLORIDE 5 MILLIGRAM(S): 10 TABLET ORAL at 06:08

## 2019-06-18 RX ADMIN — SERTRALINE 200 MILLIGRAM(S): 25 TABLET, FILM COATED ORAL at 12:43

## 2019-06-18 NOTE — PROGRESS NOTE ADULT - SUBJECTIVE AND OBJECTIVE BOX
Patient is a 74 year old female who presents with a chief complaint of Parkinson's disease (17 Jun 2019 09:37)    Patient seen and examined, no complaints    MEDICATIONS  (STANDING):  buPROPion XL . 300 milliGRAM(s) Oral daily  carbidopa 25 milliGRAM(s) Oral <User Schedule>  carbidopa/levodopa  25/100 2 Tablet(s) Oral <User Schedule>  carbidopa/levodopa CR 50/200 1 Tablet(s) Oral <User Schedule>  docusate sodium 100 milliGRAM(s) Oral two times a day  donepezil 10 milliGRAM(s) Oral at bedtime  enoxaparin Injectable 40 milliGRAM(s) SubCutaneous at bedtime  folic acid 1 milliGRAM(s) Oral daily  LORazepam     Tablet 1 milliGRAM(s) Oral at bedtime  LORazepam     Tablet 0.5 milliGRAM(s) Oral two times a day  memantine 5 milliGRAM(s) Oral two times a day  midodrine 10 milliGRAM(s) Oral <User Schedule>  midodrine 5 milliGRAM(s) Oral <User Schedule>  pantoprazole    Tablet 40 milliGRAM(s) Oral before breakfast  senna 2 Tablet(s) Oral at bedtime  sertraline 200 milliGRAM(s) Oral daily    MEDICATIONS  (PRN):  acetaminophen   Tablet .. 650 milliGRAM(s) Oral every 6 hours PRN Mild Pain (1 - 3)  polyethylene glycol 3350 17 Gram(s) Oral daily PRN Constipation      REVIEW OF SYSTEMS:  CONSTITUTIONAL: No fever, weight loss, has fatigue  EYES: No eye pain, visual disturbances, or discharge  ENMT:  No difficulty hearing, tinnitus, vertigo; No sinus or throat pain  NECK: No pain or stiffness  RESPIRATORY: No cough, wheezing, chills or hemoptysis; No shortness of breath  CARDIOVASCULAR: No chest pain, palpitations, dizziness, or leg swelling  GASTROINTESTINAL: No abdominal or epigastric pain. No nausea, vomiting, or hematemesis; No diarrhea or constipation. No melena or hematochezia.  GENITOURINARY:  h/o chronic urinary incontinence. No dysuria, frequency, hematuria  NEUROLOGICAL: No headaches, memory loss, loss of strength, numbness, or tremors  SKIN: No itching, burning, rashes, or lesions   ENDOCRINE: No heat or cold intolerance; No hair loss  MUSCULOSKELETAL: intermittent right wrist pain, No other joint pain or swelling; No muscle, back, or extremity pain  PSYCHIATRIC: No depression, anxiety, mood swings, or difficulty sleeping  HEME/LYMPH: No easy bruising, or bleeding gums  ALLERGY AND IMMUNOLOGIC: No hives or eczema    PHYSICAL EXAM:    T(C): 36.4 (06-18-19 @ 08:29), Max: 36.9 (06-17-19 @ 19:46)  HR: 99 (06-18-19 @ 08:29) (74 - 99)  BP: 109/73 (06-18-19 @ 08:29) (109/73 - 147/83)  RR: 13 (06-18-19 @ 08:29) (13 - 14)  SpO2: 95% (06-18-19 @ 08:29) (94% - 95%)      GENERAL: NAD, well-groomed, well-developed  HEAD:  Atraumatic, Normocephalic  EYES: EOMI, PERRL, conjunctiva and sclera clear  ENMT: Moist mucous membranes  NECK: Supple, No JVD, Normal thyroid  NERVOUS SYSTEM:  Alert & Oriented X1,  Speech is fluent, able to  name and repeat. Impaired attention and concentration.  Follows commands well and able to answer questions appropriately. Mood and affect  normal. Decreased short term memory. No focal weakness, severe rigidity with cogwheeling LEs more than UEs. No clear resting tremor. Bradykinesia.  Slow due to rigidity, impaired finger tapping right more than left, decreased postural stability with loss of postural reflexes   CHEST/LUNG: Clear to ascultation bilaterally; No rales, rhonchi, wheezing, or rubs  HEART: Regular rate and rhythm; No murmurs, rubs, or gallops  ABDOMEN: Soft, Nontender, Nondistended; Bowel sounds present  EXTREMITIES:  2+ Peripheral Pulses, No clubbing, cyanosis, or edema  SKIN: No rash, ecchymosis to bilat lower extremities,      LABS:                        13.1   5.75  )-----------( 214      ( 17 Jun 2019 05:30 )             40.5     06-17    141  |  105  |  17  ----------------------------<  98  4.0   |  30  |  0.82    Ca    9.4      17 Jun 2019 05:30    TPro  6.4  /  Alb  3.5  /  TBili  0.4  /  DBili  x   /  AST  20  /  ALT  13  /  AlkPhos  103  06-17      RADIOLOGY & ADDITIONAL TESTS:    Consultant(s) Notes Reviewed:  [x] YES  [ ] NO    Care Discussed with Consultants/Other Providers [x] YES  [ ] NO Patient is a 74 year old female who presents with a chief complaint of Parkinson's disease (17 Jun 2019 09:37)    Patient seen and examined, no complaints. RN reports patient with c/o nausea earlier today, improved with ginger ale    MEDICATIONS  (STANDING):  buPROPion XL . 300 milliGRAM(s) Oral daily  carbidopa 25 milliGRAM(s) Oral <User Schedule>  carbidopa/levodopa  25/100 2 Tablet(s) Oral <User Schedule>  carbidopa/levodopa CR 50/200 1 Tablet(s) Oral <User Schedule>  docusate sodium 100 milliGRAM(s) Oral two times a day  donepezil 10 milliGRAM(s) Oral at bedtime  enoxaparin Injectable 40 milliGRAM(s) SubCutaneous at bedtime  folic acid 1 milliGRAM(s) Oral daily  LORazepam     Tablet 1 milliGRAM(s) Oral at bedtime  LORazepam     Tablet 0.5 milliGRAM(s) Oral two times a day  memantine 5 milliGRAM(s) Oral two times a day  midodrine 10 milliGRAM(s) Oral <User Schedule>  midodrine 5 milliGRAM(s) Oral <User Schedule>  pantoprazole    Tablet 40 milliGRAM(s) Oral before breakfast  senna 2 Tablet(s) Oral at bedtime  sertraline 200 milliGRAM(s) Oral daily    MEDICATIONS  (PRN):  acetaminophen   Tablet .. 650 milliGRAM(s) Oral every 6 hours PRN Mild Pain (1 - 3)  polyethylene glycol 3350 17 Gram(s) Oral daily PRN Constipation      REVIEW OF SYSTEMS:  CONSTITUTIONAL: No fever, weight loss, has fatigue  EYES: No eye pain, visual disturbances, or discharge  ENMT:  No difficulty hearing, tinnitus, vertigo; No sinus or throat pain  NECK: No pain or stiffness  RESPIRATORY: No cough, wheezing, chills or hemoptysis; No shortness of breath  CARDIOVASCULAR: No chest pain, palpitations, dizziness, or leg swelling  GASTROINTESTINAL: No abdominal or epigastric pain. has nausea, no vomiting, or hematemesis; No diarrhea or constipation. No melena or hematochezia.  GENITOURINARY:  h/o chronic urinary incontinence. No dysuria, frequency, hematuria  NEUROLOGICAL: No headaches, memory loss, loss of strength, numbness, or tremors  SKIN: No itching, burning, rashes, or lesions   ENDOCRINE: No heat or cold intolerance; No hair loss  MUSCULOSKELETAL: intermittent right wrist pain, No other joint pain or swelling; No muscle, back, or extremity pain  PSYCHIATRIC: No depression, anxiety, mood swings, or difficulty sleeping  HEME/LYMPH: No easy bruising, or bleeding gums  ALLERGY AND IMMUNOLOGIC: No hives or eczema    PHYSICAL EXAM:    T(C): 36.4 (06-18-19 @ 08:29), Max: 36.9 (06-17-19 @ 19:46)  HR: 99 (06-18-19 @ 08:29) (74 - 99)  BP: 109/73 (06-18-19 @ 08:29) (109/73 - 147/83)  RR: 13 (06-18-19 @ 08:29) (13 - 14)  SpO2: 95% (06-18-19 @ 08:29) (94% - 95%)      GENERAL: NAD, well-groomed, well-developed  HEAD:  Atraumatic, Normocephalic  EYES: EOMI, PERRL, conjunctiva and sclera clear  ENMT: Moist mucous membranes  NECK: Supple, No JVD, Normal thyroid  NERVOUS SYSTEM:  Alert & Oriented X1,  Speech is fluent, able to  name and repeat. Impaired attention and concentration.  Follows commands well and able to answer questions appropriately. Mood and affect  normal. Decreased short term memory. No focal weakness, severe rigidity with cogwheeling LEs more than UEs. No clear resting tremor. Bradykinesia.  Slow due to rigidity, impaired finger tapping right more than left, decreased postural stability with loss of postural reflexes   CHEST/LUNG: Clear to ascultation bilaterally; No rales, rhonchi, wheezing, or rubs  HEART: Regular rate and rhythm; No murmurs, rubs, or gallops  ABDOMEN: Soft, Nontender, Nondistended; Bowel sounds present  EXTREMITIES:  Right hand splint in place, 2+ Peripheral Pulses, No clubbing, cyanosis, or edema  SKIN: No rash, ecchymosis to bilat lower extremities improving      LABS:                        13.1   5.75  )-----------( 214      ( 17 Jun 2019 05:30 )             40.5     06-17    141  |  105  |  17  ----------------------------<  98  4.0   |  30  |  0.82    Ca    9.4      17 Jun 2019 05:30    TPro  6.4  /  Alb  3.5  /  TBili  0.4  /  DBili  x   /  AST  20  /  ALT  13  /  AlkPhos  103  06-17      RADIOLOGY & ADDITIONAL TESTS:    Consultant(s) Notes Reviewed:  [x] YES  [ ] NO    Care Discussed with Consultants/Other Providers [x] YES  [ ] NO

## 2019-06-18 NOTE — PROGRESS NOTE ADULT - PROBLEM SELECTOR PLAN 1
comprehensive inpatient acute rehab program consisting of PT, OT and ST.   Continue current PD medication regimen.  Monitor orthostatic hypotension. Midodrine with parameters. 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

## 2019-06-18 NOTE — CONSULT NOTE ADULT - SUBJECTIVE AND OBJECTIVE BOX
Psychiatric Consult Initial Note:    Identifying Data: 75y/o   Female     History of Present Illness:  This is a 73 y/o female with PMHx advanced Parkinson's disease, orthostatic hypotension and anxiety brought in by her  to [facility] on 6/6/19 for ongoing body shaking at home and worsening unsteady gait. Patient fell 8 days ago and sustained a right wrist fracture that was splinted in the ED. On 6/5/19, per outpatient Neuro recs, Midodrine was increased to 10mg in AM along with continued 5mg in evening for severe orthostasis.  notes patient experiencing body shaking with position changes, is unaware of this and becomes extremely weak afterwards. No prior history of seizures. No head trauma, tongue biting or bowel incontinence. Patient is intermittently incontinent of urine and was treated for ESBL E. coli UTI in April. Since then has not had recurrent UTI's.     In the ED on the 6th, notable labs included normal CBC, CMP and UA with concerns for pyuria. CT head with brain atrophy, CXR clear. Patient admitted.     During hospitalization at , neurology consulted. EEG done and did not show any epileptiform activity.  Recommended to continue current regimen of Parkinson's medications - Sinemet 25/100 2 tabs 5 times per day, carbidopa 25 mg five times per day, Sinemet CR 50/200 qhs and to continue Midodrine with consideration to increase to 10 mg BID if needed.  Urine culture sent and shows >100, 000 e coli +ESBL. ID consulted and started patient on meropenem 500mg [id number]. Patient to continue meropenem until end of day on 6/13.   Patient medically stable and accepted into the Parkinson's program here at [facility] under Dr Coello's supervision on 6/10/19. (10 Carlos 2019 15:21)    Chief Complaint: "I feel like shit"    History of Present Illness/Reason for consult: Psychiatry consulted to see patient for worsening anxiety/depression and sleep disturbances related to her worsening neurologic status and Parkinson's disease. Upon interview, pt preoccupied with thoughts that her  and son are coming to bring her home. Pt states she feels nervous and that her anxiety and depression are worsening. States she takes Wellbutrin and Zoloft for her panic attacks. States she has not had a panic attack since she has been in the hospital.    Health Issues:   Parkinson's disease  REM sleep behavior disorder  Orthostatic hypotension  Wrist fracture  Anxiety  Detached Retina, Left  S/P Cataract Surgery  Macular Hole  Retinal Tear    Psychiatric Review of Systems:  Pt states she cannot sleep. Appetite is "mediocre" and her energy is "not great." Pt is afraid that she has not made progress since she has been here. Claims mood is horrible but has to work through it herself. Pt also admits to having "obsessions" but when asked about her obsessions, pt does not elaborate. Pt also states that she has had panic attacks for a long time without agoraphobia. Pt also having hallucinations of her children and  visiting her. Upon interview, pt preoccupied with "hearing a door open" or "looking at the parking lot."  Denies manic symptoms, irritability, SI/SA or homicidal ideation.     Past Psychiatric History: Unable to obtain a full psychiatric history. Awaiting collateral information from psychiatrist. Pt has been on Zoloft, Wellbutrin, and Ativan for many years. Pt taking Donepezil, Carbidopa-Levodopa, and Memantine for Parkinson's. Denies family history of psychiatric illnesses.     Substance use History: Denies hx of ETOH, smoking, or illicit drug use.     Psychosocial History:  Lives with  in 1 level house w/ ramp. Has home health aide 8hrs /day 4 to 5 days a week.  States she has 4 children, 2 girls and 2 boys. Retired .     Current medications:   acetaminophen   Tablet .. 650 milliGRAM(s) Oral every 6 hours PRN  buPROPion XL . 300 milliGRAM(s) Oral daily  carbidopa 25 milliGRAM(s) Oral <User Schedule>  carbidopa/levodopa  25/100 2 Tablet(s) Oral <User Schedule>  carbidopa/levodopa CR 50/200 1 Tablet(s) Oral <User Schedule>  docusate sodium 100 milliGRAM(s) Oral two times a day  donepezil 10 milliGRAM(s) Oral at bedtime  enoxaparin Injectable 40 milliGRAM(s) SubCutaneous at bedtime  folic acid 1 milliGRAM(s) Oral daily  LORazepam     Tablet 1 milliGRAM(s) Oral at bedtime  LORazepam     Tablet 0.5 milliGRAM(s) Oral two times a day  memantine 5 milliGRAM(s) Oral two times a day  midodrine 10 milliGRAM(s) Oral <User Schedule>  midodrine 5 milliGRAM(s) Oral <User Schedule>  ondansetron    Tablet 4 milliGRAM(s) Oral every 6 hours PRN  pantoprazole    Tablet 40 milliGRAM(s) Oral before breakfast  polyethylene glycol 3350 17 Gram(s) Oral daily PRN  senna 2 Tablet(s) Oral at bedtime  sertraline 200 milliGRAM(s) Oral daily    Labs:    06-17    141  |  105  |  17  ----------------------------<  98  4.0   |  30  |  0.82    Ca    9.4      17 Jun 2019 05:30    TPro  6.4  /  Alb  3.5  /  TBili  0.4  /  DBili  x   /  AST  20  /  ALT  13  /  AlkPhos  103  06-17    Vital Signs Last 24 hours:   T(C): 36.4 (06-18-19 @ 08:29), Max: 36.9 (06-17-19 @ 19:46)  HR: 99 (06-18-19 @ 08:29) (74 - 99)  BP: 109/73 (06-18-19 @ 08:29) (109/73 - 147/83)  RR: 13 (06-18-19 @ 08:29) (13 - 14)  SpO2: 95% (06-18-19 @ 08:29) (94% - 95%)    Allergies: No Known Allergies    Current Mental Status Exam:  Appearance: Disheveled  female, sitting in wheelchair, seems preoccupied, in NAD.    Attitude: Guarded and suspicious.  Gait/Station:  per physiatry or PT notes.  Motor Activity: bradykinetic   Affect:  labile and constricted.   Mood: depressed and anxious.   Speech: Soft and slowed.   Thought process: Slowed, but intact  Thought content/perceptions: impaired   Hallucinations: Visual and auditory.   Delusions: not present  Suicidal ideation: Denies  Homicidal ideation:  Denies  Sensorium: alert  Orientation: Impaired  Attention: Impaired  Concentration: impaired  Memory: Impaired  Insight/Judgment: poor, although as per Dr. Coello , pt is aware she is hallucinating and can reality test them.     Assessment and Plan: Plan to continue Ativan, dosage recently increased. Based on patient preference and long term usage of  her Sertraline and Wellbutrin, would continue current medication. If time permits and if acceptable to patient , can consider taper of  Sertraline and starting Lexapro, this can be done with her outpatient provider. For Rem Sleep disturbance, if no change on Ativan, can consider Klonopin in equivalent doses as an alternative.     Follow up status:  Will follow as needed.     Other recommendations: as per neurology/physiatry/ and rehab treatment team.     Level of observation:  Routine.

## 2019-06-18 NOTE — CHART NOTE - NSCHARTNOTEFT_GEN_A_CORE
Nutrition Follow Up Note  Hospital Course   (Per Electronic Medical Record):     Source:   Medical Record [X]      Diet:   DASH-TLC Diet w/ Thin Liquids   Tolerates Diet Well  Varied Intake @Meals (as Per Documentation)   Ensure Enlive 8oz PO Daily (Provides 350kcal & 20grams of Protein) - Order Pending Verification     Enteral/Parenteral Nutrition: N/A    Current Weight: 154.9lb on 6/17  Obtain New Weight  Obtain Weights Weekly     Pertinent Medications: MEDICATIONS  (STANDING):  buPROPion XL . 300 milliGRAM(s) Oral daily  carbidopa 25 milliGRAM(s) Oral <User Schedule>  carbidopa/levodopa  25/100 2 Tablet(s) Oral <User Schedule>  carbidopa/levodopa CR 50/200 1 Tablet(s) Oral <User Schedule>  docusate sodium 100 milliGRAM(s) Oral two times a day  donepezil 10 milliGRAM(s) Oral at bedtime  enoxaparin Injectable 40 milliGRAM(s) SubCutaneous at bedtime  folic acid 1 milliGRAM(s) Oral daily  LORazepam     Tablet 1 milliGRAM(s) Oral at bedtime  LORazepam     Tablet 0.5 milliGRAM(s) Oral two times a day  memantine 5 milliGRAM(s) Oral two times a day  midodrine 10 milliGRAM(s) Oral <User Schedule>  midodrine 5 milliGRAM(s) Oral <User Schedule>  pantoprazole    Tablet 40 milliGRAM(s) Oral before breakfast  senna 2 Tablet(s) Oral at bedtime  sertraline 200 milliGRAM(s) Oral daily    MEDICATIONS  (PRN):  acetaminophen   Tablet .. 650 milliGRAM(s) Oral every 6 hours PRN Mild Pain (1 - 3)  ondansetron    Tablet 4 milliGRAM(s) Oral every 6 hours PRN Nausea and/or Vomiting  polyethylene glycol 3350 17 Gram(s) Oral daily PRN Constipation    Pertinent Labs:  06-17 Na141 mmol/L Glu 98 mg/dL K+ 4.0 mmol/L Cr  0.82 mg/dL BUN 17 mg/dL 06-17 Alb 3.5 g/dL    Skin: No Pressure Ulcers     Edema: None Noted     Last BM: on 6/17    Estimated Needs:   [X] No Change Since Previous Assessment    Previous Nutrition Diagnosis:   Mild Malnutrition     Nutrition Diagnosis is [X] Ongoing - Nutrition Supplement Order Pending Verification     New Nutrition Diagnosis: [X] Not Applicable    Interventions:   1. Ensure Enlive 8oz PO Daily (Provides 350kcal & 20grams of Protein)  2. Recommend Continue Nutrition Plan of Care     Monitoring & Evaluation:   [X] Weights   [X] PO Intake   [X] Follow Up (Per Protocol)  [X] Tolerance to Diet Prescription   [X] Other: Labs     Registered Dietitian/Nutritionist Remains Available.  Tonio Gale RDN    Pager # [phone]  Phone# [phone]

## 2019-06-18 NOTE — PROGRESS NOTE ADULT - SUBJECTIVE AND OBJECTIVE BOX
CHIEF COMPLAINT: 'my  left me here'      HISTORY OF PRESENT ILLNESS  This is a 75 y/o female with PMHx advanced Parkinson's disease, orthostatic hypotension and anxiety brought in by her  to [facility] on 6/6/19 for ongoing body shaking at home and worsening unsteady gait. Patient fell 8 days ago and sustained a right wrist fracture that was splinted in the ED. On 6/5/19, per outpatient Neuro recs, Midodrine was increased to 10mg in AM along with continued 5mg in evening for severe orthostasis.  notes patient experiencing body shaking with position changes, is unaware of this and becomes extremely weak afterwards. No prior history of seizures. No head trauma, tongue biting or bowel incontinence. Patient is intermittently incontinent of urine and was treated for ESBL E. coli UTI in April. SInce then has not had recurrent UTI's.     In the ED on the 6th, notable labs included normal CBC, CMP and UA with concerns for pyuria. CT head with brain atrophy, CXR clear. Patient admitted.     During hospitalization at , neurology consulted. EEG done and did not show any epileptiform activity.  Recommended to continue current regimen of Parkinson's medications - Sinemet 25/100 2 tabs 5 times per day, carbidopa 25 mg five times per day, Sinemet CR 50/200 qhs and to continue Midodrine with consideration to increase to 10 mg BID if needed.  Urine culture sent and shows >100, 000 e coli +ESBL. ID consulted and started patient on meropenem 500mg [id number]. Patient to continue meropenem until end of day on 6/13.   Patient medically stable and accepted into the Parkinson's program here at [facility] under Dr Coello's supervision on 6/10/19. (10 Carlos 2019 15:21)      PAST MEDICAL & SURGICAL HISTORY:  Parkinson disease  Anxiety  Detached Retina, Left: and macular hole 5/2011  S/P Cataract Surgery: 2011  Macular Hole: 2010  Retinal Tear: 2003       REVIEW OF SYMPTOMS  [X] Constitutional WNL     [X] Cardio WNL            [X] Resp WNL           [X] GI WNL                          [X]  WNL                   [X] Heme WNL              [X] Endo WNL                     [X] Skin WNL           VITALS  Vital Signs Last 24 Hrs  T(C): 36.4 (18 Jun 2019 08:29), Max: 36.9 (17 Jun 2019 19:46)  T(F): 97.5 (18 Jun 2019 08:29), Max: 98.4 (17 Jun 2019 19:46)  HR: 99 (18 Jun 2019 08:29) (74 - 99)  BP: 109/73 (18 Jun 2019 08:29) (109/73 - 147/83)  BP(mean): --  RR: 13 (18 Jun 2019 08:29) (13 - 14)  SpO2: 95% (18 Jun 2019 08:29) (94% - 95%)      PHYSICAL EXAM  Constitutional - depressed/sad  HEENT - NCAT, EOMI  Neck - Supple, No limited ROM  Chest - CTA bilaterally, No wheeze, No rhonchi, No crackles  Cardiovascular - RRR, S1S2, No murmurs  Abdomen - BS+, Soft, NTND  Extremities - No C/C/E, No calf tenderness, splint R wrist   Skin-no rash  Wounds-na  Neurologic Exam -  bradykinesia, rigidity are unchanged           FUNCTIONAL PROGRESS  Gait - 35ft max A  ADLs - max A  Transfers - max A  Functional transfer - max A      RECENT LABS                        13.1   5.75  )-----------( 214      ( 17 Jun 2019 05:30 )             40.5     06-17    141  |  105  |  17  ----------------------------<  98  4.0   |  30  |  0.82    Ca    9.4      17 Jun 2019 05:30    TPro  6.4  /  Alb  3.5  /  TBili  0.4  /  DBili  x   /  AST  20  /  ALT  13  /  AlkPhos  103  06-17      LIVER FUNCTIONS - ( 17 Jun 2019 05:30 )  Alb: 3.5 g/dL / Pro: 6.4 g/dL / ALK PHOS: 103 U/L / ALT: 13 U/L DA / AST: 20 U/L / GGT: x                           RADIOLOGY/OTHER RESULTS      CURRENT MEDICATIONS  MEDICATIONS  (STANDING):  buPROPion XL . 300 milliGRAM(s) Oral daily  carbidopa 25 milliGRAM(s) Oral <User Schedule>  carbidopa/levodopa  25/100 2 Tablet(s) Oral <User Schedule>  carbidopa/levodopa CR 50/200 1 Tablet(s) Oral <User Schedule>  docusate sodium 100 milliGRAM(s) Oral two times a day  donepezil 10 milliGRAM(s) Oral at bedtime  enoxaparin Injectable 40 milliGRAM(s) SubCutaneous at bedtime  folic acid 1 milliGRAM(s) Oral daily  LORazepam     Tablet 1 milliGRAM(s) Oral at bedtime  LORazepam     Tablet 0.5 milliGRAM(s) Oral two times a day  memantine 5 milliGRAM(s) Oral two times a day  midodrine 10 milliGRAM(s) Oral <User Schedule>  midodrine 5 milliGRAM(s) Oral <User Schedule>  pantoprazole    Tablet 40 milliGRAM(s) Oral before breakfast  senna 2 Tablet(s) Oral at bedtime  sertraline 200 milliGRAM(s) Oral daily    MEDICATIONS  (PRN):  acetaminophen   Tablet .. 650 milliGRAM(s) Oral every 6 hours PRN Mild Pain (1 - 3)  ondansetron    Tablet 4 milliGRAM(s) Oral every 6 hours PRN Nausea and/or Vomiting  polyethylene glycol 3350 17 Gram(s) Oral daily PRN Constipation      ASSESSMENT & PLAN      GI/Bowel Management - Colace   Management - Toilet Q2  Skin - Turn Q2  Pain - Tylenol PRN  DVT PPX - Lovenox  Diet - reg c thins    Continue comprehensive acute rehab program consisting of 3hrs/day of OT/PT and SLP.

## 2019-06-18 NOTE — PROGRESS NOTE ADULT - ASSESSMENT
This is a 73 y/o female with PMHx advanced Parkinson's disease, orthostatic hypotension and anxiety, recent fall with +right wrist fx, brought in by her  to [facility] on 6/6/19 for ongoing body shaking at home and worsening unsteady gait, found to have e coli ESBL+ UTI and placed on meropenem, being admitted to the Parkinson's program here at [facility] for acute inpatient treatment of Parkinson's disease, gait and ADL dysfunction.

## 2019-06-18 NOTE — PROGRESS NOTE ADULT - ASSESSMENT
73 y/o female with PMHx advanced Parkinson's disease, orthostatic hypotension, and anxiety, recent fall with +right wrist fracture, brought in by her  to [facility] on 6/6/2019 for ongoing body shaking at home and worsening unsteady gait, found to have e coli ESBL+ UTI and placed on meropenem, being admitted to the Parkinson's program here at [facility] for acute inpatient treatment of Parkinson's disease, gait and ADL dysfunction.     > Parkinson's disease  - c/w carbidopa/levodopa per neurology  - comprehensive PT/OT/ rehab/ SLP program, Parkinson's program    >E. coli ESBL+ UTI  - c/w IV meropenem  IVPB 500 milliGRAM(s) IV Intermittent every 8 hours per ID    > Right UE with wrist fracture  - c/w splint, pain control  - [facility] per Orthopedic eval     > Orthostatic hypotension  - c/w midodrine     > Anxiety  - c/w LORazepam     Tablet 0.5 milliGRAM(s) Oral two times a day, buPROPion XL . 300 milliGRAM(s) Oral daily, sertraline 200 milliGRAM(s) Oral daily  - f/u psychiatry    > Constipation   - c/w docusate sodium 100 milliGRAM(s) Oral two times a day, senna 2 Tablet(s) Oral at bedtime    > Dementia   - c/w donepezil 10 milliGRAM(s) Oral at bedtime, memantine 5 milliGRAM(s) Oral two times a day, folic acid 1 milliGRAM(s) Oral daily  - frequent re-orientation    > GERD  - c/w pantoprazole    Tablet 40 milliGRAM(s) Oral before breakfast    > DVT ppx:  - enoxaparin Injectable 40 milliGRAM(s) SubCutaneous at bedtime 75 y/o female with PMHx advanced Parkinson's disease, orthostatic hypotension, and anxiety, recent fall with +right wrist fracture, brought in by her  to [facility] on 6/6/2019 for ongoing body shaking at home and worsening unsteady gait, found to have e coli ESBL+ UTI and placed on meropenem, being admitted to the Parkinson's program here at [facility] for acute inpatient treatment of Parkinson's disease, gait and ADL dysfunction.     > Parkinson's disease  - c/w carbidopa/levodopa per neurology  - comprehensive PT/OT/ rehab/ SLP program, Parkinson's program    >E. coli ESBL+ UTI  - off IV meropenem  IVPB 500 milliGRAM(s) IV Intermittent every 8 hours per ID    > Right UE with wrist fracture  - c/w splint, pain control  - [facility] per Orthopedic eval     > Orthostatic hypotension  - c/w midodrine     > Anxiety  - c/w LORazepam Tablet 0.5 milliGRAM(s) Oral two times a day, and 1 mg at bedtime, buPROPion  milliGRAM(s) Oral daily, sertraline 200 milliGRAM(s) Oral daily  - f/u psychiatry    > Nausea  - zofran prn    > Constipation   - had BM, c/w docusate sodium 100 milliGRAM(s) Oral two times a day, senna 2 Tablet(s) Oral at bedtime    > Dementia   - c/w donepezil 10 milliGRAM(s) Oral at bedtime, memantine 5 milliGRAM(s) Oral two times a day, folic acid 1 milliGRAM(s) Oral daily  - frequent re-orientation    > GERD  - c/w pantoprazole  Tablet 40 milliGRAM(s) Oral before breakfast    > DVT ppx:  - enoxaparin Injectable 40 milliGRAM(s) SubCutaneous at bedtime

## 2019-06-19 DIAGNOSIS — Z22.9 CARRIER OF INFECTIOUS DISEASE, UNSPECIFIED: ICD-10-CM

## 2019-06-19 LAB
ALBUMIN SERPL ELPH-MCNC: 3.3 G/DL — SIGNIFICANT CHANGE UP (ref 3.3–5)
ALP SERPL-CCNC: 119 U/L — SIGNIFICANT CHANGE UP (ref 40–120)
ALT FLD-CCNC: 19 U/L DA — SIGNIFICANT CHANGE UP (ref 10–45)
ANION GAP SERPL CALC-SCNC: 9 MMOL/L — SIGNIFICANT CHANGE UP (ref 5–17)
AST SERPL-CCNC: 29 U/L — SIGNIFICANT CHANGE UP (ref 10–40)
BILIRUB SERPL-MCNC: 0.3 MG/DL — SIGNIFICANT CHANGE UP (ref 0.2–1.2)
BUN SERPL-MCNC: 20 MG/DL — SIGNIFICANT CHANGE UP (ref 7–23)
CALCIUM SERPL-MCNC: 9.2 MG/DL — SIGNIFICANT CHANGE UP (ref 8.4–10.5)
CHLORIDE SERPL-SCNC: 106 MMOL/L — SIGNIFICANT CHANGE UP (ref 96–108)
CO2 SERPL-SCNC: 27 MMOL/L — SIGNIFICANT CHANGE UP (ref 22–31)
CREAT SERPL-MCNC: 0.93 MG/DL — SIGNIFICANT CHANGE UP (ref 0.5–1.3)
GLUCOSE SERPL-MCNC: 105 MG/DL — HIGH (ref 70–99)
HCT VFR BLD CALC: 38.8 % — SIGNIFICANT CHANGE UP (ref 34.5–45)
HGB BLD-MCNC: 12.6 G/DL — SIGNIFICANT CHANGE UP (ref 11.5–15.5)
MCHC RBC-ENTMCNC: 30.4 PG — SIGNIFICANT CHANGE UP (ref 27–34)
MCHC RBC-ENTMCNC: 32.5 GM/DL — SIGNIFICANT CHANGE UP (ref 32–36)
MCV RBC AUTO: 93.5 FL — SIGNIFICANT CHANGE UP (ref 80–100)
NRBC # BLD: 0 /100 WBCS — SIGNIFICANT CHANGE UP (ref 0–0)
PLATELET # BLD AUTO: 187 K/UL — SIGNIFICANT CHANGE UP (ref 150–400)
POTASSIUM SERPL-MCNC: 3.8 MMOL/L — SIGNIFICANT CHANGE UP (ref 3.5–5.3)
POTASSIUM SERPL-SCNC: 3.8 MMOL/L — SIGNIFICANT CHANGE UP (ref 3.5–5.3)
PROT SERPL-MCNC: 6.4 G/DL — SIGNIFICANT CHANGE UP (ref 6–8.3)
RBC # BLD: 4.15 M/UL — SIGNIFICANT CHANGE UP (ref 3.8–5.2)
RBC # FLD: 13.5 % — SIGNIFICANT CHANGE UP (ref 10.3–14.5)
SODIUM SERPL-SCNC: 142 MMOL/L — SIGNIFICANT CHANGE UP (ref 135–145)
WBC # BLD: 7.64 K/UL — SIGNIFICANT CHANGE UP (ref 3.8–10.5)
WBC # FLD AUTO: 7.64 K/UL — SIGNIFICANT CHANGE UP (ref 3.8–10.5)

## 2019-06-19 PROCEDURE — 99233 SBSQ HOSP IP/OBS HIGH 50: CPT

## 2019-06-19 PROCEDURE — 99232 SBSQ HOSP IP/OBS MODERATE 35: CPT

## 2019-06-19 PROCEDURE — 90792 PSYCH DIAG EVAL W/MED SRVCS: CPT

## 2019-06-19 RX ADMIN — SENNA PLUS 2 TABLET(S): 8.6 TABLET ORAL at 21:28

## 2019-06-19 RX ADMIN — Medication 0.5 MILLIGRAM(S): at 18:14

## 2019-06-19 RX ADMIN — ENOXAPARIN SODIUM 40 MILLIGRAM(S): 100 INJECTION SUBCUTANEOUS at 21:28

## 2019-06-19 RX ADMIN — PANTOPRAZOLE SODIUM 40 MILLIGRAM(S): 20 TABLET, DELAYED RELEASE ORAL at 06:05

## 2019-06-19 RX ADMIN — Medication 25 MILLIGRAM(S): at 13:02

## 2019-06-19 RX ADMIN — CARBIDOPA AND LEVODOPA 2 TABLET(S): 25; 100 TABLET ORAL at 10:09

## 2019-06-19 RX ADMIN — Medication 25 MILLIGRAM(S): at 15:12

## 2019-06-19 RX ADMIN — MEMANTINE HYDROCHLORIDE 5 MILLIGRAM(S): 10 TABLET ORAL at 06:05

## 2019-06-19 RX ADMIN — Medication 1 MILLIGRAM(S): at 21:28

## 2019-06-19 RX ADMIN — Medication 1 MILLIGRAM(S): at 11:38

## 2019-06-19 RX ADMIN — Medication 100 MILLIGRAM(S): at 06:05

## 2019-06-19 RX ADMIN — Medication 0.5 MILLIGRAM(S): at 06:05

## 2019-06-19 RX ADMIN — MIDODRINE HYDROCHLORIDE 10 MILLIGRAM(S): 2.5 TABLET ORAL at 07:30

## 2019-06-19 RX ADMIN — CARBIDOPA AND LEVODOPA 2 TABLET(S): 25; 100 TABLET ORAL at 19:02

## 2019-06-19 RX ADMIN — SERTRALINE 200 MILLIGRAM(S): 25 TABLET, FILM COATED ORAL at 11:38

## 2019-06-19 RX ADMIN — CARBIDOPA AND LEVODOPA 1 TABLET(S): 25; 100 TABLET ORAL at 20:02

## 2019-06-19 RX ADMIN — CARBIDOPA AND LEVODOPA 2 TABLET(S): 25; 100 TABLET ORAL at 15:12

## 2019-06-19 RX ADMIN — Medication 100 MILLIGRAM(S): at 18:14

## 2019-06-19 RX ADMIN — BUPROPION HYDROCHLORIDE 300 MILLIGRAM(S): 150 TABLET, EXTENDED RELEASE ORAL at 11:38

## 2019-06-19 RX ADMIN — Medication 25 MILLIGRAM(S): at 10:09

## 2019-06-19 RX ADMIN — MEMANTINE HYDROCHLORIDE 5 MILLIGRAM(S): 10 TABLET ORAL at 18:14

## 2019-06-19 RX ADMIN — CARBIDOPA AND LEVODOPA 2 TABLET(S): 25; 100 TABLET ORAL at 07:02

## 2019-06-19 RX ADMIN — Medication 25 MILLIGRAM(S): at 19:02

## 2019-06-19 RX ADMIN — Medication 25 MILLIGRAM(S): at 07:01

## 2019-06-19 RX ADMIN — DONEPEZIL HYDROCHLORIDE 10 MILLIGRAM(S): 10 TABLET, FILM COATED ORAL at 21:28

## 2019-06-19 RX ADMIN — CARBIDOPA AND LEVODOPA 2 TABLET(S): 25; 100 TABLET ORAL at 13:02

## 2019-06-19 NOTE — PROGRESS NOTE ADULT - PROBLEM SELECTOR PLAN 1
>continue comprehensive inpatient acute rehab program consisting of PT, OT and ST.   >Continue current PD medication regimen / carbidopa/levodopa  Monitor orthostatic hypotension. Midodrine with parameters. Fall precautions

## 2019-06-19 NOTE — PROGRESS NOTE ADULT - SUBJECTIVE AND OBJECTIVE BOX
CC: "We want to know if this infection is still there"    HISTORY OF PRESENT ILLNESS: 75 y/o female with PMHx advanced Parkinson's disease, orthostatic hypotension, and anxiety, recent fall with +right wrist fracture, brought in by her  to [facility] on 6/6/2019 for ongoing body shaking at home and worsening unsteady gait, found to have e coli ESBL+ UTI and placed on meropenem    Patient was admitted to the Parkinson's program  at [facility] for acute inpatient treatment of Parkinson's disease, gait and ADL dysfunction.    INTERIM HISTORY: patient examined with  at bedside, history and chart/ consultant notes reviewed.  Patient is anxious to go home where she lives with her  and a part time aide ( is primary caregiver but he states "I myself need help") in [place]  Her PCP   is Dr Virgil Obrien in [place]; Her primary Neurologist is Dr Raygoza in [place]    PAST MEDICAL & SURGICAL HISTORY:  Parkinson disease  Anxiety  Detached Retina, Left: and macular hole 5/2011  S/P Cataract Surgery: 2011  Macular Hole: 2010  Retinal Tear: 2003    Allergies    No Known Allergies or intolerances    MEDICATIONS  (STANDING):  buPROPion XL . 300 milliGRAM(s) Oral daily  carbidopa 25 milliGRAM(s) Oral <User Schedule>  carbidopa/levodopa  25/100 2 Tablet(s) Oral <User Schedule>  carbidopa/levodopa CR 50/200 1 Tablet(s) Oral <User Schedule>  docusate sodium 100 milliGRAM(s) Oral two times a day  donepezil 10 milliGRAM(s) Oral at bedtime  enoxaparin Injectable 40 milliGRAM(s) SubCutaneous at bedtime  folic acid 1 milliGRAM(s) Oral daily  LORazepam     Tablet 1 milliGRAM(s) Oral at bedtime  LORazepam     Tablet 0.5 milliGRAM(s) Oral two times a day  memantine 5 milliGRAM(s) Oral two times a day  midodrine 10 milliGRAM(s) Oral <User Schedule>  midodrine 5 milliGRAM(s) Oral <User Schedule>  pantoprazole    Tablet 40 milliGRAM(s) Oral before breakfast  senna 2 Tablet(s) Oral at bedtime  sertraline 200 milliGRAM(s) Oral daily    MEDICATIONS  (PRN):  acetaminophen   Tablet .. 650 milliGRAM(s) Oral every 6 hours PRN Mild Pain (1 - 3)  ondansetron    Tablet 4 milliGRAM(s) Oral every 6 hours PRN Nausea and/or Vomiting  polyethylene glycol 3350 17 Gram(s) Oral daily PRN Constipation    ROS: Pain is controlled despite right wrist fracture; no CP SOB N V D   DENIES DYSURIA, POLYURIA, FREQUENCY      T(C): 26.6 (06-19-19 @ 07:35), Max: 36.8 (06-18-19 @ 20:14)  HR: 84 (06-19-19 @ 07:35) (71 - 84)  BP: 107/69 (06-19-19 @ 07:35) (107/69 - 125/68)  RR: 14 (06-19-19 @ 07:35) (14 - 15)  SpO2: 95% (06-19-19 @ 07:35) (94% - 95%)  Wt(kg): --Vital Signs Last 24 Hrs  T(C): 26.6 (19 Jun 2019 07:35), Max: 36.8 (18 Jun 2019 20:14)  T(F): 79.9 (19 Jun 2019 07:35), Max: 98.2 (18 Jun 2019 20:14)  HR: 84 (19 Jun 2019 07:35) (71 - 84)  BP: 107/69 (19 Jun 2019 07:35) (107/69 - 125/68)  BP(mean): --  RR: 14 (19 Jun 2019 07:35) (14 - 15)  SpO2: 95% (19 Jun 2019 07:35) (94% - 95%)    PHYSICAL EXAM:  GENERAL: NAD, well-groomed, well-developed  HEAD:  Atraumatic, Normocephalic  EYES: EOMI, PERRLA, conjunctiva and sclera clear  ENMT: No tonsillar erythema, exudates, or enlargement; Moist mucous membranes, Good dentition  NECK: Supple, No JVD  NERVOUS SYSTEM:  Alert & Oriented X3, Good concentration; Motor Strength 5/5 B/L upper and lower extremities; DTRs 2+ intact and symmetric  CHEST/LUNG: Clear to percussion bilaterally; No rales, rhonchi, wheezing, or rubs  HEART: Regular rate and rhythm; No murmurs, rubs, or gallops  ABDOMEN: Soft, Nontender, Nondistended; Bowel sounds present  EXTREMITIES:  2+ Peripheral Pulses, No clubbing, cyanosis, or edema; right wrist in splint, fingers sensate and mobile  LYMPH: No lymphadenopathy noted  SKIN: No rashes or lesions    (06-19 @ 06:40)                      12.6  7.64 )-----------( 187                 38.8    Neutrophils = -- (--%)  Lymphocytes = -- (--%)  Eosinophils = -- (--%)  Basophils = -- (--%)  Monocytes = -- (--%)  Bands = --%    06-19    142  |  106  |  20  ----------------------------<  105<H>  3.8   |  27  |  0.93    Ca    9.2      19 Jun 2019 06:40    TPro  6.4  /  Alb  3.3  /  TBili  0.3  /  DBili  x   /  AST  29  /  ALT  19  /  AlkPhos  119  06-19      Most recent and last weeks CBC 's reviewed, no leukocytosis  Prior UA from [place] reviewed, < 50WBC but culture ESBL >100k colonies also noted

## 2019-06-19 NOTE — PROGRESS NOTE ADULT - ASSESSMENT
This is a 75 y/o female with PMHx advanced Parkinson's disease, orthostatic hypotension and anxiety, recent fall with +right wrist fx, brought in by her  to [facility] on 6/6/19 for ongoing body shaking at home and worsening unsteady gait, found to have e coli ESBL+ UTI and placed on meropenem, being admitted to the Parkinson's program here at [facility] for acute inpatient treatment of Parkinson's disease, gait and ADL dysfunction.

## 2019-06-19 NOTE — PROGRESS NOTE ADULT - ASSESSMENT
73 y/o female with PMHx advanced Parkinson's disease, orthostatic hypotension, and anxiety, recent fall with +right wrist fracture, treated for ESBL E.Coli

## 2019-06-19 NOTE — PROGRESS NOTE ADULT - SUBJECTIVE AND OBJECTIVE BOX
CHIEF COMPLAINT: no new acute events overnight, no new complaints      HISTORY OF PRESENT ILLNESS    This is a 75 y/o female with PMHx advanced Parkinson's disease, orthostatic hypotension and anxiety brought in by her  to [facility] on 6/6/19 for ongoing body shaking at home and worsening unsteady gait. Patient fell 8 days ago and sustained a right wrist fracture that was splinted in the ED. On 6/5/19, per outpatient Neuro recs, Midodrine was increased to 10mg in AM along with continued 5mg in evening for severe orthostasis.  notes patient experiencing body shaking with position changes, is unaware of this and becomes extremely weak afterwards. No prior history of seizures. No head trauma, tongue biting or bowel incontinence. Patient is intermittently incontinent of urine and was treated for ESBL E. coli UTI in April. SInce then has not had recurrent UTI's.     In the ED on the 6th, notable labs included normal CBC, CMP and UA with concerns for pyuria. CT head with brain atrophy, CXR clear. Patient admitted.     During hospitalization at , neurology consulted. EEG done and did not show any epileptiform activity.  Recommended to continue current regimen of Parkinson's medications - Sinemet 25/100 2 tabs 5 times per day, carbidopa 25 mg five times per day, Sinemet CR 50/200 qhs and to continue Midodrine with consideration to increase to 10 mg BID if needed.  Urine culture sent and shows >100, 000 e coli +ESBL. ID consulted and started patient on meropenem 500mg [id number]. Patient to continue meropenem until end of day on 6/13.   Patient medically stable and accepted into the Parkinson's program here at [facility] under Dr Coello's supervision on 6/10/19. (10 Carlos 2019 15:21)        PAST MEDICAL & SURGICAL HISTORY:  Parkinson disease  Anxiety  Detached Retina, Left: and macular hole 5/2011  S/P Cataract Surgery: 2011  Macular Hole: 2010  Retinal Tear: 2003      VITALS  Vital Signs Last 24 Hrs  T(C): 26.6 (19 Jun 2019 07:35), Max: 36.8 (18 Jun 2019 20:14)  T(F): 79.9 (19 Jun 2019 07:35), Max: 98.2 (18 Jun 2019 20:14)  HR: 84 (19 Jun 2019 07:35) (71 - 84)  BP: 107/69 (19 Jun 2019 07:35) (107/69 - 125/68)  BP(mean): --  RR: 14 (19 Jun 2019 07:35) (14 - 15)  SpO2: 95% (19 Jun 2019 07:35) (94% - 95%)      PHYSICAL EXAM  Constitutional - NAD, Comfortable  HEENT - NCAT, EOMI  Neck - Supple, No limited ROM  Chest - CTA bilaterally, No wheeze, No rhonchi, No crackles  Cardiovascular - RRR, S1S2, No murmurs  Abdomen - BS+, Soft, NTND  Extremities - No C/C/E, No calf tenderness   Skin-no rash  Neurologic Exam -     stable without new focal deficits                 RECENT LABS                        12.6   7.64  )-----------( 187      ( 19 Jun 2019 06:40 )             38.8     06-19    142  |  106  |  20  ----------------------------<  105<H>  3.8   |  27  |  0.93    Ca    9.2      19 Jun 2019 06:40    TPro  6.4  /  Alb  3.3  /  TBili  0.3  /  DBili  x   /  AST  29  /  ALT  19  /  AlkPhos  119  06-19      LIVER FUNCTIONS - ( 19 Jun 2019 06:40 )  Alb: 3.3 g/dL / Pro: 6.4 g/dL / ALK PHOS: 119 U/L / ALT: 19 U/L DA / AST: 29 U/L / GGT: x                 CURRENT MEDICATIONS  MEDICATIONS  (STANDING):  buPROPion XL . 300 milliGRAM(s) Oral daily  carbidopa 25 milliGRAM(s) Oral <User Schedule>  carbidopa/levodopa  25/100 2 Tablet(s) Oral <User Schedule>  carbidopa/levodopa CR 50/200 1 Tablet(s) Oral <User Schedule>  docusate sodium 100 milliGRAM(s) Oral two times a day  donepezil 10 milliGRAM(s) Oral at bedtime  enoxaparin Injectable 40 milliGRAM(s) SubCutaneous at bedtime  folic acid 1 milliGRAM(s) Oral daily  LORazepam     Tablet 1 milliGRAM(s) Oral at bedtime  LORazepam     Tablet 0.5 milliGRAM(s) Oral two times a day  memantine 5 milliGRAM(s) Oral two times a day  midodrine 10 milliGRAM(s) Oral <User Schedule>  midodrine 5 milliGRAM(s) Oral <User Schedule>  pantoprazole    Tablet 40 milliGRAM(s) Oral before breakfast  senna 2 Tablet(s) Oral at bedtime  sertraline 200 milliGRAM(s) Oral daily    MEDICATIONS  (PRN):  acetaminophen   Tablet .. 650 milliGRAM(s) Oral every 6 hours PRN Mild Pain (1 - 3)  ondansetron    Tablet 4 milliGRAM(s) Oral every 6 hours PRN Nausea and/or Vomiting  polyethylene glycol 3350 17 Gram(s) Oral daily PRN Constipation

## 2019-06-20 PROCEDURE — 99232 SBSQ HOSP IP/OBS MODERATE 35: CPT

## 2019-06-20 PROCEDURE — 99233 SBSQ HOSP IP/OBS HIGH 50: CPT

## 2019-06-20 RX ADMIN — Medication 25 MILLIGRAM(S): at 09:51

## 2019-06-20 RX ADMIN — ENOXAPARIN SODIUM 40 MILLIGRAM(S): 100 INJECTION SUBCUTANEOUS at 21:02

## 2019-06-20 RX ADMIN — DONEPEZIL HYDROCHLORIDE 10 MILLIGRAM(S): 10 TABLET, FILM COATED ORAL at 21:02

## 2019-06-20 RX ADMIN — SENNA PLUS 2 TABLET(S): 8.6 TABLET ORAL at 21:03

## 2019-06-20 RX ADMIN — Medication 25 MILLIGRAM(S): at 16:08

## 2019-06-20 RX ADMIN — CARBIDOPA AND LEVODOPA 2 TABLET(S): 25; 100 TABLET ORAL at 16:08

## 2019-06-20 RX ADMIN — Medication 1 MILLIGRAM(S): at 21:02

## 2019-06-20 RX ADMIN — CARBIDOPA AND LEVODOPA 2 TABLET(S): 25; 100 TABLET ORAL at 09:52

## 2019-06-20 RX ADMIN — Medication 25 MILLIGRAM(S): at 06:41

## 2019-06-20 RX ADMIN — Medication 0.5 MILLIGRAM(S): at 05:38

## 2019-06-20 RX ADMIN — CARBIDOPA AND LEVODOPA 2 TABLET(S): 25; 100 TABLET ORAL at 06:41

## 2019-06-20 RX ADMIN — Medication 1 MILLIGRAM(S): at 12:24

## 2019-06-20 RX ADMIN — Medication 25 MILLIGRAM(S): at 12:54

## 2019-06-20 RX ADMIN — PANTOPRAZOLE SODIUM 40 MILLIGRAM(S): 20 TABLET, DELAYED RELEASE ORAL at 06:41

## 2019-06-20 RX ADMIN — MEMANTINE HYDROCHLORIDE 5 MILLIGRAM(S): 10 TABLET ORAL at 17:47

## 2019-06-20 RX ADMIN — Medication 100 MILLIGRAM(S): at 05:38

## 2019-06-20 RX ADMIN — CARBIDOPA AND LEVODOPA 2 TABLET(S): 25; 100 TABLET ORAL at 18:45

## 2019-06-20 RX ADMIN — Medication 25 MILLIGRAM(S): at 18:45

## 2019-06-20 RX ADMIN — BUPROPION HYDROCHLORIDE 300 MILLIGRAM(S): 150 TABLET, EXTENDED RELEASE ORAL at 12:24

## 2019-06-20 RX ADMIN — SERTRALINE 200 MILLIGRAM(S): 25 TABLET, FILM COATED ORAL at 12:24

## 2019-06-20 RX ADMIN — MEMANTINE HYDROCHLORIDE 5 MILLIGRAM(S): 10 TABLET ORAL at 05:38

## 2019-06-20 RX ADMIN — Medication 0.5 MILLIGRAM(S): at 17:47

## 2019-06-20 RX ADMIN — CARBIDOPA AND LEVODOPA 1 TABLET(S): 25; 100 TABLET ORAL at 20:00

## 2019-06-20 RX ADMIN — Medication 100 MILLIGRAM(S): at 17:47

## 2019-06-20 RX ADMIN — CARBIDOPA AND LEVODOPA 2 TABLET(S): 25; 100 TABLET ORAL at 12:54

## 2019-06-20 NOTE — PROGRESS NOTE ADULT - SUBJECTIVE AND OBJECTIVE BOX
CHIEF COMPLAINT: 'I'm sad'.      HISTORY OF PRESENT ILLNESS  This is a 75 y/o female with PMHx advanced Parkinson's disease, orthostatic hypotension and anxiety brought in by her  to [facility] on 6/6/19 for ongoing body shaking at home and worsening unsteady gait. Patient fell 8 days ago and sustained a right wrist fracture that was splinted in the ED. On 6/5/19, per outpatient Neuro recs, Midodrine was increased to 10mg in AM along with continued 5mg in evening for severe orthostasis.  notes patient experiencing body shaking with position changes, is unaware of this and becomes extremely weak afterwards. No prior history of seizures. No head trauma, tongue biting or bowel incontinence. Patient is intermittently incontinent of urine and was treated for ESBL E. coli UTI in April. SInce then has not had recurrent UTI's.     In the ED on the 6th, notable labs included normal CBC, CMP and UA with concerns for pyuria. CT head with brain atrophy, CXR clear. Patient admitted.     During hospitalization at , neurology consulted. EEG done and did not show any epileptiform activity.  Recommended to continue current regimen of Parkinson's medications - Sinemet 25/100 2 tabs 5 times per day, carbidopa 25 mg five times per day, Sinemet CR 50/200 qhs and to continue Midodrine with consideration to increase to 10 mg BID if needed.  Urine culture sent and shows >100, 000 e coli +ESBL. ID consulted and started patient on meropenem 500mg [id number]. Patient to continue meropenem until end of day on 6/13.   Patient medically stable and accepted into the Parkinson's program here at [facility] under Dr Coello's supervision on 6/10/19. (10 Carlos 2019 15:21)      PAST MEDICAL & SURGICAL HISTORY:  Parkinson disease  Anxiety  Detached Retina, Left: and macular hole 5/2011  S/P Cataract Surgery: 2011  Macular Hole: 2010  Retinal Tear: 2003       REVIEW OF SYMPTOMS  [X] Constitutional WNL     [X] Cardio WNL            [X] Resp WNL           [X] GI WNL                          [X]  WNL                   [X] Heme WNL              [X] Endo WNL                     [X] Skin WNL                 VITALS  Vital Signs Last 24 Hrs  T(C): 36.7 (20 Jun 2019 07:40), Max: 36.9 (19 Jun 2019 20:26)  T(F): 98.1 (20 Jun 2019 07:40), Max: 98.4 (19 Jun 2019 20:26)  HR: 78 (20 Jun 2019 07:40) (62 - 78)  BP: 129/71 (20 Jun 2019 07:40) (126/75 - 144/72)  BP(mean): --  RR: 14 (20 Jun 2019 07:40) (14 - 14)  SpO2: 95% (20 Jun 2019 07:40) (94% - 96%)      PHYSICAL EXAM  Constitutional - Emotional  HEENT - NCAT, EOMI  Neck - Supple, No limited ROM  Chest - CTA bilaterally, No wheeze, No rhonchi, No crackles  Cardiovascular - RRR, S1S2, No murmurs  Abdomen - BS+, Soft, NTND  Extremities - No C/C/E, No calf tenderness   Skin-no rash  Wounds-na      Neurologic Exam -  no new deficits                  Balance - impaired     Psychiatric - Anxious, depressed         FUNCTIONAL PROGRESS  Gait - max A  ADLs - max A  Transfers - max A  Functional transfer - max A    RECENT LABS                        12.6   7.64  )-----------( 187      ( 19 Jun 2019 06:40 )             38.8     06-19    142  |  106  |  20  ----------------------------<  105<H>  3.8   |  27  |  0.93    Ca    9.2      19 Jun 2019 06:40    TPro  6.4  /  Alb  3.3  /  TBili  0.3  /  DBili  x   /  AST  29  /  ALT  19  /  AlkPhos  119  06-19      LIVER FUNCTIONS - ( 19 Jun 2019 06:40 )  Alb: 3.3 g/dL / Pro: 6.4 g/dL / ALK PHOS: 119 U/L / ALT: 19 U/L DA / AST: 29 U/L / GGT: x                           RADIOLOGY/OTHER RESULTS      CURRENT MEDICATIONS  MEDICATIONS  (STANDING):  buPROPion XL . 300 milliGRAM(s) Oral daily  carbidopa 25 milliGRAM(s) Oral <User Schedule>  carbidopa/levodopa  25/100 2 Tablet(s) Oral <User Schedule>  carbidopa/levodopa CR 50/200 1 Tablet(s) Oral <User Schedule>  docusate sodium 100 milliGRAM(s) Oral two times a day  donepezil 10 milliGRAM(s) Oral at bedtime  enoxaparin Injectable 40 milliGRAM(s) SubCutaneous at bedtime  folic acid 1 milliGRAM(s) Oral daily  LORazepam     Tablet 0.5 milliGRAM(s) Oral two times a day  LORazepam     Tablet 1 milliGRAM(s) Oral at bedtime  memantine 5 milliGRAM(s) Oral two times a day  midodrine 10 milliGRAM(s) Oral <User Schedule>  midodrine 5 milliGRAM(s) Oral <User Schedule>  pantoprazole    Tablet 40 milliGRAM(s) Oral before breakfast  senna 2 Tablet(s) Oral at bedtime  sertraline 200 milliGRAM(s) Oral daily    MEDICATIONS  (PRN):  acetaminophen   Tablet .. 650 milliGRAM(s) Oral every 6 hours PRN Mild Pain (1 - 3)  ondansetron    Tablet 4 milliGRAM(s) Oral every 6 hours PRN Nausea and/or Vomiting  polyethylene glycol 3350 17 Gram(s) Oral daily PRN Constipation      ASSESSMENT & PLAN    GI/Bowel Management - Colace   Management - Toilet Q2  Skin - Turn Q2  Pain - Tylenol PRN  DVT PPX - Lovenox  Diet - reg c thins    Continue comprehensive acute rehab program consisting of 3hrs/day of OT/PT and SLP.

## 2019-06-20 NOTE — PROGRESS NOTE ADULT - ASSESSMENT
This is a 73 y/o female with PMHx advanced Parkinson's disease, orthostatic hypotension and anxiety, recent fall with +right wrist fx, brought in by her  to [facility] on 6/6/19 for ongoing body shaking at home and worsening unsteady gait, found to have e coli ESBL+ UTI and placed on meropenem, being admitted to the Parkinson's program here at [facility] for acute inpatient treatment of Parkinson's disease, gait and ADL dysfunction.

## 2019-06-20 NOTE — PROGRESS NOTE ADULT - SUBJECTIVE AND OBJECTIVE BOX
Patient is a 74 year old  Female who presents with a chief complaint of PD c functional deficits (20 Jun 2019 11:55)        MEDICATIONS  (STANDING):  buPROPion XL . 300 milliGRAM(s) Oral daily  carbidopa 25 milliGRAM(s) Oral <User Schedule>  carbidopa/levodopa  25/100 2 Tablet(s) Oral <User Schedule>  carbidopa/levodopa CR 50/200 1 Tablet(s) Oral <User Schedule>  docusate sodium 100 milliGRAM(s) Oral two times a day  donepezil 10 milliGRAM(s) Oral at bedtime  enoxaparin Injectable 40 milliGRAM(s) SubCutaneous at bedtime  folic acid 1 milliGRAM(s) Oral daily  LORazepam     Tablet 0.5 milliGRAM(s) Oral two times a day  LORazepam     Tablet 1 milliGRAM(s) Oral at bedtime  memantine 5 milliGRAM(s) Oral two times a day  midodrine 10 milliGRAM(s) Oral <User Schedule>  midodrine 5 milliGRAM(s) Oral <User Schedule>  pantoprazole    Tablet 40 milliGRAM(s) Oral before breakfast  senna 2 Tablet(s) Oral at bedtime  sertraline 200 milliGRAM(s) Oral daily    MEDICATIONS  (PRN):  acetaminophen   Tablet .. 650 milliGRAM(s) Oral every 6 hours PRN Mild Pain (1 - 3)  ondansetron    Tablet 4 milliGRAM(s) Oral every 6 hours PRN Nausea and/or Vomiting  polyethylene glycol 3350 17 Gram(s) Oral daily PRN Constipation      REVIEW OF SYSTEMS:  CONSTITUTIONAL: No fever, weight loss, or fatigue  EYES: No eye pain, visual disturbances, or discharge  ENMT:  No difficulty hearing, tinnitus, vertigo; No sinus or throat pain  NECK: No pain or stiffness  RESPIRATORY: No cough, wheezing, chills or hemoptysis; No shortness of breath  CARDIOVASCULAR: No chest pain, palpitations, dizziness, or leg swelling  GASTROINTESTINAL: No abdominal or epigastric pain. No nausea, vomiting, or hematemesis; No diarrhea or constipation. No melena or hematochezia.  GENITOURINARY: No dysuria, frequency, hematuria, or incontinence  NEUROLOGICAL: No headaches, memory loss, loss of strength, numbness, or tremors  SKIN: No itching, burning, rashes, or lesions   LYMPH NODES: No enlarged glands  ENDOCRINE: No heat or cold intolerance; No hair loss  MUSCULOSKELETAL: No joint pain or swelling; No muscle, back, or extremity pain  PSYCHIATRIC: No depression, anxiety, mood swings, or difficulty sleeping  HEME/LYMPH: No easy bruising, or bleeding gums  ALLERY AND IMMUNOLOGIC: No hives or eczema    PHYSICAL EXAM:    T(C): 36.7 (06-20-19 @ 07:40), Max: 36.9 (06-19-19 @ 20:26)  HR: 78 (06-20-19 @ 07:40) (62 - 78)  BP: 129/71 (06-20-19 @ 07:40) (126/75 - 144/72)  RR: 14 (06-20-19 @ 07:40) (14 - 14)  SpO2: 95% (06-20-19 @ 07:40) (94% - 96%)  Wt(kg): --  I&O's Summary      GENERAL: NAD, well-groomed, well-developed  HEAD:  Atraumatic, Normocephalic  EYES: EOMI, PERRL, conjunctiva and sclera clear  ENMT: No tonsillar erythema, exudates, or enlargement; Moist mucous membranes, Good dentition, No lesions  NECK: Supple, No JVD, Normal thyroid  NERVOUS SYSTEM:  Alert & Oriented X3, Good concentration; Motor Strength 5/5 B/L upper and lower extremities; DTRs 2+ intact and symmetric  CHEST/LUNG: Clear to ascultation bilaterally; No rales, rhonchi, wheezing, or rubs  HEART: Regular rate and rhythm; No murmurs, rubs, or gallops  ABDOMEN: Soft, Nontender, Nondistended; Bowel sounds present  EXTREMITIES:  2+ Peripheral Pulses, No clubbing, cyanosis, or edema  LYMPH: No lymphadenopathy noted  SKIN: No rashes or lesions    LABS:                        12.6   7.64  )-----------( 187      ( 19 Jun 2019 06:40 )             38.8     06-19    142  |  106  |  20  ----------------------------<  105<H>  3.8   |  27  |  0.93    Ca    9.2      19 Jun 2019 06:40    TPro  6.4  /  Alb  3.3  /  TBili  0.3  /  DBili  x   /  AST  29  /  ALT  19  /  AlkPhos  119  06-19      RADIOLOGY & ADDITIONAL TESTS:    Consultant(s) Notes Reviewed:  [x] YES  [ ] NO    Care Discussed with Consultants/Other Providers [x] YES  [ ] NO Patient is a 74 year old  Female who presents with a chief complaint of PD c functional deficits (20 Jun 2019 11:55)    Patient seen and examined, no complaints. Asking for her .      MEDICATIONS  (STANDING):  buPROPion XL . 300 milliGRAM(s) Oral daily  carbidopa 25 milliGRAM(s) Oral <User Schedule>  carbidopa/levodopa  25/100 2 Tablet(s) Oral <User Schedule>  carbidopa/levodopa CR 50/200 1 Tablet(s) Oral <User Schedule>  docusate sodium 100 milliGRAM(s) Oral two times a day  donepezil 10 milliGRAM(s) Oral at bedtime  enoxaparin Injectable 40 milliGRAM(s) SubCutaneous at bedtime  folic acid 1 milliGRAM(s) Oral daily  LORazepam     Tablet 0.5 milliGRAM(s) Oral two times a day  LORazepam     Tablet 1 milliGRAM(s) Oral at bedtime  memantine 5 milliGRAM(s) Oral two times a day  midodrine 10 milliGRAM(s) Oral <User Schedule>  midodrine 5 milliGRAM(s) Oral <User Schedule>  pantoprazole    Tablet 40 milliGRAM(s) Oral before breakfast  senna 2 Tablet(s) Oral at bedtime  sertraline 200 milliGRAM(s) Oral daily    MEDICATIONS  (PRN):  acetaminophen   Tablet .. 650 milliGRAM(s) Oral every 6 hours PRN Mild Pain (1 - 3)  ondansetron    Tablet 4 milliGRAM(s) Oral every 6 hours PRN Nausea and/or Vomiting  polyethylene glycol 3350 17 Gram(s) Oral daily PRN Constipation      REVIEW OF SYSTEMS:  REVIEW OF SYSTEMS:  CONSTITUTIONAL: No fever, weight loss, has fatigue  EYES: No eye pain, visual disturbances, or discharge  ENMT:  No difficulty hearing, tinnitus, vertigo; No sinus or throat pain  NECK: No pain or stiffness  RESPIRATORY: No cough, wheezing, chills or hemoptysis; No shortness of breath  CARDIOVASCULAR: No chest pain, palpitations, dizziness, or leg swelling  GASTROINTESTINAL: No abdominal or epigastric pain. has nausea, no vomiting, or hematemesis; No diarrhea or constipation. No melena or hematochezia.  GENITOURINARY:  h/o chronic urinary incontinence. No dysuria, frequency, hematuria  NEUROLOGICAL: No headaches, memory loss, loss of strength, numbness, or tremors  SKIN: No itching, burning, rashes, or lesions   ENDOCRINE: No heat or cold intolerance; No hair loss  MUSCULOSKELETAL: intermittent right wrist pain, No other joint pain or swelling; No muscle, back, or extremity pain  PSYCHIATRIC: No depression, anxiety, mood swings, or difficulty sleeping  HEME/LYMPH: No easy bruising, or bleeding gums  ALLERGY AND IMMUNOLOGIC: No hives or eczema      PHYSICAL EXAM:    T(C): 36.7 (06-20-19 @ 07:40), Max: 36.9 (06-19-19 @ 20:26)  HR: 78 (06-20-19 @ 07:40) (62 - 78)  BP: 129/71 (06-20-19 @ 07:40) (126/75 - 144/72)  RR: 14 (06-20-19 @ 07:40) (14 - 14)  SpO2: 95% (06-20-19 @ 07:40) (94% - 96%)        GENERAL: NAD, well-groomed, well-developed  HEAD:  Atraumatic, Normocephalic  EYES: EOMI, PERRL, conjunctiva and sclera clear  ENMT: Moist mucous membranes  NECK: Supple, No JVD, Normal thyroid  NERVOUS SYSTEM:  Alert & Oriented X1,  Speech is fluent, able to  name and repeat. Impaired attention and concentration.  Follows commands well and able to answer questions appropriately. Mood and affect  normal. Decreased short term memory. No focal weakness, severe rigidity with cogwheeling LEs more than UEs. No clear resting tremor. Bradykinesia.  Slow due to rigidity, impaired finger tapping right more than left, decreased postural stability with loss of postural reflexes   CHEST/LUNG: Clear to ascultation bilaterally; No rales, rhonchi, wheezing, or rubs  HEART: Regular rate and rhythm; No murmurs, rubs, or gallops  ABDOMEN: Soft, Nontender, Nondistended; Bowel sounds present  EXTREMITIES:  Right hand splint in place, 2+ Peripheral Pulses, No clubbing, cyanosis, or edema  SKIN: No rash, ecchymosis to bilat lower extremities improving      LABS:                        12.6   7.64  )-----------( 187      ( 19 Jun 2019 06:40 )             38.8     06-19    142  |  106  |  20  ----------------------------<  105<H>  3.8   |  27  |  0.93    Ca    9.2      19 Jun 2019 06:40    TPro  6.4  /  Alb  3.3  /  TBili  0.3  /  DBili  x   /  AST  29  /  ALT  19  /  AlkPhos  119  06-19      RADIOLOGY & ADDITIONAL TESTS:    Consultant(s) Notes Reviewed:  [x] YES  [ ] NO    Care Discussed with Consultants/Other Providers [x] YES  [ ] NO

## 2019-06-20 NOTE — PROGRESS NOTE ADULT - ASSESSMENT
75 y/o female with PMHx advanced Parkinson's disease, orthostatic hypotension, and anxiety, recent fall with +right wrist fracture, brought in by her  to [facility] on 6/6/2019 for ongoing body shaking at home and worsening unsteady gait, found to have e coli ESBL+ UTI and placed on meropenem, being admitted to the Parkinson's program here at [facility] for acute inpatient treatment of Parkinson's disease, gait and ADL dysfunction.     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       >E. coli ESBL+ UTI  - off IV meropenem  IVPB 500 milliGRAM(s) IV Intermittent every 8 hours per ID    > Right UE with wrist fracture  - c/w splint, pain control  - NWB per Orthopedic eval     > Orthostatic hypotension  - c/w midodrine     > Anxiety and REM disordered  - c/w LORazepam Tablet 0.5 milliGRAM(s) Oral two times a day, and 1 mg at bedtime, buPROPion  milliGRAM(s) Oral daily, sertraline 200 milliGRAM(s) Oral daily  - f/u psychiatry    > Nausea  - zofran prn    > Constipation   - had BM, c/w docusate sodium 100 milliGRAM(s) Oral two times a day, senna 2 Tablet(s) Oral at bedtime    > Dementia   - c/w donepezil 10 milliGRAM(s) Oral at bedtime, memantine 5 milliGRAM(s) Oral two times a day, folic acid 1 milliGRAM(s) Oral daily  - frequent re-orientation    > GERD  - c/w pantoprazole  Tablet 40 milliGRAM(s) Oral before breakfast    > DVT ppx:  - enoxaparin Injectable 40 milliGRAM(s) SubCutaneous at bedtime

## 2019-06-21 LAB
ALBUMIN SERPL ELPH-MCNC: 3.3 G/DL — SIGNIFICANT CHANGE UP (ref 3.3–5)
ALP SERPL-CCNC: 108 U/L — SIGNIFICANT CHANGE UP (ref 40–120)
ALT FLD-CCNC: 15 U/L DA — SIGNIFICANT CHANGE UP (ref 10–45)
ANION GAP SERPL CALC-SCNC: 9 MMOL/L — SIGNIFICANT CHANGE UP (ref 5–17)
AST SERPL-CCNC: 12 U/L — SIGNIFICANT CHANGE UP (ref 10–40)
BILIRUB SERPL-MCNC: 0.3 MG/DL — SIGNIFICANT CHANGE UP (ref 0.2–1.2)
BUN SERPL-MCNC: 15 MG/DL — SIGNIFICANT CHANGE UP (ref 7–23)
CALCIUM SERPL-MCNC: 9.3 MG/DL — SIGNIFICANT CHANGE UP (ref 8.4–10.5)
CHLORIDE SERPL-SCNC: 107 MMOL/L — SIGNIFICANT CHANGE UP (ref 96–108)
CO2 SERPL-SCNC: 28 MMOL/L — SIGNIFICANT CHANGE UP (ref 22–31)
CREAT SERPL-MCNC: 0.88 MG/DL — SIGNIFICANT CHANGE UP (ref 0.5–1.3)
GLUCOSE SERPL-MCNC: 92 MG/DL — SIGNIFICANT CHANGE UP (ref 70–99)
HCT VFR BLD CALC: 38.1 % — SIGNIFICANT CHANGE UP (ref 34.5–45)
HGB BLD-MCNC: 12.4 G/DL — SIGNIFICANT CHANGE UP (ref 11.5–15.5)
MCHC RBC-ENTMCNC: 29.7 PG — SIGNIFICANT CHANGE UP (ref 27–34)
MCHC RBC-ENTMCNC: 32.5 GM/DL — SIGNIFICANT CHANGE UP (ref 32–36)
MCV RBC AUTO: 91.4 FL — SIGNIFICANT CHANGE UP (ref 80–100)
NRBC # BLD: 0 /100 WBCS — SIGNIFICANT CHANGE UP (ref 0–0)
PLATELET # BLD AUTO: 204 K/UL — SIGNIFICANT CHANGE UP (ref 150–400)
POTASSIUM SERPL-MCNC: 3.7 MMOL/L — SIGNIFICANT CHANGE UP (ref 3.5–5.3)
POTASSIUM SERPL-SCNC: 3.7 MMOL/L — SIGNIFICANT CHANGE UP (ref 3.5–5.3)
PROT SERPL-MCNC: 6.3 G/DL — SIGNIFICANT CHANGE UP (ref 6–8.3)
RBC # BLD: 4.17 M/UL — SIGNIFICANT CHANGE UP (ref 3.8–5.2)
RBC # FLD: 13.3 % — SIGNIFICANT CHANGE UP (ref 10.3–14.5)
SODIUM SERPL-SCNC: 144 MMOL/L — SIGNIFICANT CHANGE UP (ref 135–145)
WBC # BLD: 5.76 K/UL — SIGNIFICANT CHANGE UP (ref 3.8–10.5)
WBC # FLD AUTO: 5.76 K/UL — SIGNIFICANT CHANGE UP (ref 3.8–10.5)

## 2019-06-21 PROCEDURE — 99233 SBSQ HOSP IP/OBS HIGH 50: CPT

## 2019-06-21 RX ORDER — BUPROPION HYDROCHLORIDE 150 MG/1
150 TABLET, EXTENDED RELEASE ORAL DAILY
Refills: 0 | Status: DISCONTINUED | OUTPATIENT
Start: 2019-06-22 | End: 2019-06-24

## 2019-06-21 RX ORDER — QUETIAPINE FUMARATE 200 MG/1
12.5 TABLET, FILM COATED ORAL AT BEDTIME
Refills: 0 | Status: DISCONTINUED | OUTPATIENT
Start: 2019-06-21 | End: 2019-06-25

## 2019-06-21 RX ADMIN — Medication 0.5 MILLIGRAM(S): at 05:38

## 2019-06-21 RX ADMIN — Medication 100 MILLIGRAM(S): at 17:27

## 2019-06-21 RX ADMIN — MEMANTINE HYDROCHLORIDE 5 MILLIGRAM(S): 10 TABLET ORAL at 05:38

## 2019-06-21 RX ADMIN — Medication 25 MILLIGRAM(S): at 12:52

## 2019-06-21 RX ADMIN — ENOXAPARIN SODIUM 40 MILLIGRAM(S): 100 INJECTION SUBCUTANEOUS at 21:07

## 2019-06-21 RX ADMIN — CARBIDOPA AND LEVODOPA 2 TABLET(S): 25; 100 TABLET ORAL at 06:49

## 2019-06-21 RX ADMIN — CARBIDOPA AND LEVODOPA 2 TABLET(S): 25; 100 TABLET ORAL at 12:52

## 2019-06-21 RX ADMIN — CARBIDOPA AND LEVODOPA 2 TABLET(S): 25; 100 TABLET ORAL at 18:46

## 2019-06-21 RX ADMIN — CARBIDOPA AND LEVODOPA 2 TABLET(S): 25; 100 TABLET ORAL at 10:03

## 2019-06-21 RX ADMIN — SERTRALINE 200 MILLIGRAM(S): 25 TABLET, FILM COATED ORAL at 12:43

## 2019-06-21 RX ADMIN — QUETIAPINE FUMARATE 12.5 MILLIGRAM(S): 200 TABLET, FILM COATED ORAL at 21:07

## 2019-06-21 RX ADMIN — SENNA PLUS 2 TABLET(S): 8.6 TABLET ORAL at 21:07

## 2019-06-21 RX ADMIN — Medication 100 MILLIGRAM(S): at 05:38

## 2019-06-21 RX ADMIN — BUPROPION HYDROCHLORIDE 300 MILLIGRAM(S): 150 TABLET, EXTENDED RELEASE ORAL at 12:43

## 2019-06-21 RX ADMIN — Medication 25 MILLIGRAM(S): at 10:03

## 2019-06-21 RX ADMIN — MEMANTINE HYDROCHLORIDE 5 MILLIGRAM(S): 10 TABLET ORAL at 17:27

## 2019-06-21 RX ADMIN — Medication 25 MILLIGRAM(S): at 16:02

## 2019-06-21 RX ADMIN — Medication 25 MILLIGRAM(S): at 18:46

## 2019-06-21 RX ADMIN — Medication 0.5 MILLIGRAM(S): at 17:27

## 2019-06-21 RX ADMIN — Medication 1 MILLIGRAM(S): at 12:43

## 2019-06-21 RX ADMIN — Medication 25 MILLIGRAM(S): at 06:49

## 2019-06-21 RX ADMIN — Medication 1 MILLIGRAM(S): at 21:07

## 2019-06-21 RX ADMIN — DONEPEZIL HYDROCHLORIDE 10 MILLIGRAM(S): 10 TABLET, FILM COATED ORAL at 21:07

## 2019-06-21 RX ADMIN — CARBIDOPA AND LEVODOPA 1 TABLET(S): 25; 100 TABLET ORAL at 20:10

## 2019-06-21 RX ADMIN — CARBIDOPA AND LEVODOPA 2 TABLET(S): 25; 100 TABLET ORAL at 16:02

## 2019-06-21 RX ADMIN — PANTOPRAZOLE SODIUM 40 MILLIGRAM(S): 20 TABLET, DELAYED RELEASE ORAL at 05:38

## 2019-06-21 NOTE — PROGRESS NOTE ADULT - PROBLEM SELECTOR PLAN 2
Wellbutrin. Zoloft. Ativan  Psychiatry and neuropsychology are following  Case discussed with Psychiatry and primary MD neurologist. In order to control delusions/ psychotic  features will small dose Seroquel at bedtime and lower Wellbutrin dose. Will monitor closely

## 2019-06-21 NOTE — PROGRESS NOTE ADULT - PROBLEM SELECTOR PLAN 1
comprehensive inpatient acute rehab program consisting of PT, OT and ST.   Continue current PD medication regimen.  Monitor orthostatic hypotension.   Midodrine with parameters.   Fall precautions

## 2019-06-21 NOTE — PROGRESS NOTE ADULT - SUBJECTIVE AND OBJECTIVE BOX
Patient is a 74y old  Female who presents with a chief complaint of PD c functional deficits (20 Jun 2019 11:55)        MEDICATIONS  (STANDING):  buPROPion XL . 300 milliGRAM(s) Oral daily  carbidopa 25 milliGRAM(s) Oral <User Schedule>  carbidopa/levodopa  25/100 2 Tablet(s) Oral <User Schedule>  carbidopa/levodopa CR 50/200 1 Tablet(s) Oral <User Schedule>  docusate sodium 100 milliGRAM(s) Oral two times a day  donepezil 10 milliGRAM(s) Oral at bedtime  enoxaparin Injectable 40 milliGRAM(s) SubCutaneous at bedtime  folic acid 1 milliGRAM(s) Oral daily  LORazepam     Tablet 0.5 milliGRAM(s) Oral two times a day  LORazepam     Tablet 1 milliGRAM(s) Oral at bedtime  memantine 5 milliGRAM(s) Oral two times a day  midodrine 10 milliGRAM(s) Oral <User Schedule>  midodrine 5 milliGRAM(s) Oral <User Schedule>  pantoprazole    Tablet 40 milliGRAM(s) Oral before breakfast  senna 2 Tablet(s) Oral at bedtime  sertraline 200 milliGRAM(s) Oral daily    MEDICATIONS  (PRN):  acetaminophen   Tablet .. 650 milliGRAM(s) Oral every 6 hours PRN Mild Pain (1 - 3)  ondansetron    Tablet 4 milliGRAM(s) Oral every 6 hours PRN Nausea and/or Vomiting  polyethylene glycol 3350 17 Gram(s) Oral daily PRN Constipation      REVIEW OF SYSTEMS:  CONSTITUTIONAL: No fever, weight loss, or fatigue  EYES: No eye pain, visual disturbances, or discharge  ENMT:  No difficulty hearing, tinnitus, vertigo; No sinus or throat pain  NECK: No pain or stiffness  RESPIRATORY: No cough, wheezing, chills or hemoptysis; No shortness of breath  CARDIOVASCULAR: No chest pain, palpitations, dizziness, or leg swelling  GASTROINTESTINAL: No abdominal or epigastric pain. No nausea, vomiting, or hematemesis; No diarrhea or constipation. No melena or hematochezia.  GENITOURINARY: No dysuria, frequency, hematuria, or incontinence  NEUROLOGICAL: No headaches, memory loss, loss of strength, numbness, or tremors  SKIN: No itching, burning, rashes, or lesions   LYMPH NODES: No enlarged glands  ENDOCRINE: No heat or cold intolerance; No hair loss  MUSCULOSKELETAL: No joint pain or swelling; No muscle, back, or extremity pain  PSYCHIATRIC: No depression, anxiety, mood swings, or difficulty sleeping  HEME/LYMPH: No easy bruising, or bleeding gums  ALLERY AND IMMUNOLOGIC: No hives or eczema    PHYSICAL EXAM:    T(C): 36.8 (06-21-19 @ 07:43), Max: 36.8 (06-20-19 @ 20:34)  HR: 69 (06-21-19 @ 07:43) (69 - 85)  BP: 145/79 (06-21-19 @ 07:43) (132/84 - 145/79)  RR: 14 (06-21-19 @ 07:43) (14 - 14)  SpO2: 95% (06-21-19 @ 07:43) (94% - 95%)  Wt(kg): --  I&O's Summary      GENERAL: NAD, well-groomed, well-developed  HEAD:  Atraumatic, Normocephalic  EYES: EOMI, PERRL, conjunctiva and sclera clear  ENMT: No tonsillar erythema, exudates, or enlargement; Moist mucous membranes, Good dentition, No lesions  NECK: Supple, No JVD, Normal thyroid  NERVOUS SYSTEM:  Alert & Oriented X3, Good concentration; Motor Strength 5/5 B/L upper and lower extremities; DTRs 2+ intact and symmetric  CHEST/LUNG: Clear to ascultation bilaterally; No rales, rhonchi, wheezing, or rubs  HEART: Regular rate and rhythm; No murmurs, rubs, or gallops  ABDOMEN: Soft, Nontender, Nondistended; Bowel sounds present  EXTREMITIES:  2+ Peripheral Pulses, No clubbing, cyanosis, or edema  LYMPH: No lymphadenopathy noted  SKIN: No rashes or lesions    LABS:                        12.4   5.76  )-----------( 204      ( 21 Jun 2019 05:30 )             38.1     06-21    144  |  107  |  15  ----------------------------<  92  3.7   |  28  |  0.88    Ca    9.3      21 Jun 2019 05:30    TPro  6.3  /  Alb  3.3  /  TBili  0.3  /  DBili  x   /  AST  12  /  ALT  15  /  AlkPhos  108  06-21        RADIOLOGY & ADDITIONAL TESTS:      Consultant(s) Notes Reviewed:  [x] YES  [ ] NO    Care Discussed with Consultants/Other Providers [x] YES  [ ] NO Patient is a 74 year old  Female who presents with a chief complaint of PD with functional deficits (20 Jun 2019 11:55)    Patient seen and examined, no complaints.     MEDICATIONS  (STANDING):  buPROPion XL . 300 milliGRAM(s) Oral daily  carbidopa 25 milliGRAM(s) Oral <User Schedule>  carbidopa/levodopa  25/100 2 Tablet(s) Oral <User Schedule>  carbidopa/levodopa CR 50/200 1 Tablet(s) Oral <User Schedule>  docusate sodium 100 milliGRAM(s) Oral two times a day  donepezil 10 milliGRAM(s) Oral at bedtime  enoxaparin Injectable 40 milliGRAM(s) SubCutaneous at bedtime  folic acid 1 milliGRAM(s) Oral daily  LORazepam     Tablet 0.5 milliGRAM(s) Oral two times a day  LORazepam     Tablet 1 milliGRAM(s) Oral at bedtime  memantine 5 milliGRAM(s) Oral two times a day  midodrine 10 milliGRAM(s) Oral <User Schedule>  midodrine 5 milliGRAM(s) Oral <User Schedule>  pantoprazole    Tablet 40 milliGRAM(s) Oral before breakfast  senna 2 Tablet(s) Oral at bedtime  sertraline 200 milliGRAM(s) Oral daily    MEDICATIONS  (PRN):  acetaminophen   Tablet .. 650 milliGRAM(s) Oral every 6 hours PRN Mild Pain (1 - 3)  ondansetron    Tablet 4 milliGRAM(s) Oral every 6 hours PRN Nausea and/or Vomiting  polyethylene glycol 3350 17 Gram(s) Oral daily PRN Constipation      REVIEW OF SYSTEMS:  CONSTITUTIONAL: No fever, weight loss, has fatigue  EYES: No eye pain, visual disturbances, or discharge  ENMT:  No difficulty hearing, tinnitus, vertigo; No sinus or throat pain  NECK: No pain or stiffness  RESPIRATORY: No cough, wheezing, chills or hemoptysis; No shortness of breath  CARDIOVASCULAR: No chest pain, palpitations, dizziness, or leg swelling  GASTROINTESTINAL: No abdominal or epigastric pain. has nausea, no vomiting, or hematemesis; No diarrhea or constipation. No melena or hematochezia.  GENITOURINARY:  h/o chronic urinary incontinence. No dysuria, frequency, hematuria  NEUROLOGICAL: No headaches, memory loss, loss of strength, numbness, or tremors  SKIN: No itching, burning, rashes, or lesions   ENDOCRINE: No heat or cold intolerance; No hair loss  MUSCULOSKELETAL: intermittent right wrist pain, No other joint pain or swelling; No muscle, back, or extremity pain  PSYCHIATRIC: No depression, anxiety, mood swings, or difficulty sleeping  HEME/LYMPH: No easy bruising, or bleeding gums  ALLERGY AND IMMUNOLOGIC: No hives or eczema      PHYSICAL EXAM:    T(C): 36.8 (06-21-19 @ 07:43), Max: 36.8 (06-20-19 @ 20:34)  HR: 69 (06-21-19 @ 07:43) (69 - 85)  BP: 145/79 (06-21-19 @ 07:43) (132/84 - 145/79)  RR: 14 (06-21-19 @ 07:43) (14 - 14)  SpO2: 95% (06-21-19 @ 07:43) (94% - 95%)      GENERAL: NAD, well-groomed, well-developed  HEAD:  Atraumatic, Normocephalic  EYES: EOMI, PERRL, conjunctiva and sclera clear  ENMT: Moist mucous membranes  NECK: Supple, No JVD, Normal thyroid  NERVOUS SYSTEM:  Alert & Oriented X1,  Speech is fluent, able to  name and repeat. Impaired attention and concentration.  Follows commands well and able to answer questions appropriately. Mood and affect  normal. Decreased short term memory. No focal weakness, severe rigidity with cogwheeling LEs more than UEs. No clear resting tremor. Bradykinesia.  Slow due to rigidity, impaired finger tapping right more than left, decreased postural stability with loss of postural reflexes   CHEST/LUNG: Clear to ascultation bilaterally; No rales, rhonchi, wheezing, or rubs  HEART: Regular rate and rhythm; No murmurs, rubs, or gallops  ABDOMEN: Soft, Nontender, Nondistended; Bowel sounds present  EXTREMITIES:  Right hand splint in place, 2+ Peripheral Pulses, No clubbing, cyanosis, or edema  SKIN: No rash, ecchymosis to bilat lower extremities improving          LABS:                        12.4   5.76  )-----------( 204      ( 21 Jun 2019 05:30 )             38.1     06-21    144  |  107  |  15  ----------------------------<  92  3.7   |  28  |  0.88    Ca    9.3      21 Jun 2019 05:30    TPro  6.3  /  Alb  3.3  /  TBili  0.3  /  DBili  x   /  AST  12  /  ALT  15  /  AlkPhos  108  06-21        RADIOLOGY & ADDITIONAL TESTS:      Consultant(s) Notes Reviewed:  [x] YES  [ ] NO    Care Discussed with Consultants/Other Providers [x] YES  [ ] NO

## 2019-06-21 NOTE — PROGRESS NOTE ADULT - ASSESSMENT
This is a 73 y/o female with PMHx advanced Parkinson's disease, orthostatic hypotension and anxiety, recent fall with +right wrist fx, brought in by her  to [facility] on 6/6/19 for ongoing body shaking at home and worsening unsteady gait, found to have e coli ESBL+ UTI and placed on meropenem, being admitted to the Parkinson's program here at [facility] for acute inpatient treatment of Parkinson's disease, gait and ADL dysfunction.

## 2019-06-21 NOTE — PROGRESS NOTE ADULT - ASSESSMENT
73 y/o female with PMHx advanced Parkinson's disease, orthostatic hypotension, and anxiety, recent fall with +right wrist fracture, brought in by her  to [facility] on 6/6/2019 for ongoing body shaking at home and worsening unsteady gait, found to have e coli ESBL+ UTI and placed on meropenem, being admitted to the Parkinson's program here at [facility] for acute inpatient treatment of Parkinson's disease, gait and ADL dysfunction.     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       >E. coli ESBL+ UTI  - off IV meropenem  IVPB 500 milliGRAM(s) IV Intermittent every 8 hours per ID    > Right UE with wrist fracture  - c/w splint, pain control  - NWB per Orthopedic eval     > Orthostatic hypotension  - c/w midodrine     > Anxiety and REM disordered  - c/w LORazepam Tablet 0.5 milliGRAM(s) Oral two times a day, and 1 mg at bedtime, buPROPion  milliGRAM(s) Oral daily, sertraline 200 milliGRAM(s) Oral daily  - f/u psychiatry    > Nausea  - zofran prn    > Constipation   - had BM, c/w docusate sodium 100 milliGRAM(s) Oral two times a day, senna 2 Tablet(s) Oral at bedtime    > Dementia   - c/w donepezil 10 milliGRAM(s) Oral at bedtime, memantine 5 milliGRAM(s) Oral two times a day, folic acid 1 milliGRAM(s) Oral daily  - frequent re-orientation    > GERD  - c/w pantoprazole  Tablet 40 milliGRAM(s) Oral before breakfast    > DVT ppx:  - enoxaparin Injectable 40 milliGRAM(s) SubCutaneous at bedtime

## 2019-06-21 NOTE — PROGRESS NOTE ADULT - SUBJECTIVE AND OBJECTIVE BOX
CHIEF COMPLAINT: anxious, fixated on her  "abandoning her " despite his daily presence       HISTORY OF PRESENT ILLNESS    This is a 75 y/o female with PMHx advanced Parkinson's disease, orthostatic hypotension and anxiety brought in by her  to [facility] on 6/6/19 for ongoing body shaking at home and worsening unsteady gait. Patient fell 8 days ago and sustained a right wrist fracture that was splinted in the ED. On 6/5/19, per outpatient Neuro recs, Midodrine was increased to 10mg in AM along with continued 5mg in evening for severe orthostasis.  notes patient experiencing body shaking with position changes, is unaware of this and becomes extremely weak afterwards. No prior history of seizures. No head trauma, tongue biting or bowel incontinence. Patient is intermittently incontinent of urine and was treated for ESBL E. coli UTI in April. SInce then has not had recurrent UTI's.     In the ED on the 6th, notable labs included normal CBC, CMP and UA with concerns for pyuria. CT head with brain atrophy, CXR clear. Patient admitted.     During hospitalization at , neurology consulted. EEG done and did not show any epileptiform activity.  Recommended to continue current regimen of Parkinson's medications - Sinemet 25/100 2 tabs 5 times per day, carbidopa 25 mg five times per day, Sinemet CR 50/200 qhs and to continue Midodrine with consideration to increase to 10 mg BID if needed.  Urine culture sent and shows >100, 000 e coli +ESBL. ID consulted and started patient on meropenem 500mg [id number]. Patient to continue meropenem until end of day on 6/13.   Patient medically stable and accepted into the Parkinson's program here at [facility] under Dr Coello's supervision on 6/10/19. (10 Carlos 2019 15:21)        PAST MEDICAL & SURGICAL HISTORY:  Parkinson disease  Anxiety  Detached Retina, Left: and macular hole 5/2011  S/P Cataract Surgery: 2011  Macular Hole: 2010  Retinal Tear: 2003x -       VITALS  Vital Signs Last 24 Hrs  T(C): 36.8 (21 Jun 2019 07:43), Max: 36.8 (20 Jun 2019 20:34)  T(F): 98.3 (21 Jun 2019 07:43), Max: 98.3 (20 Jun 2019 20:34)  HR: 69 (21 Jun 2019 07:43) (69 - 85)  BP: 145/79 (21 Jun 2019 07:43) (132/84 - 145/79)  BP(mean): --  RR: 14 (21 Jun 2019 07:43) (14 - 14)  SpO2: 95% (21 Jun 2019 07:43) (94% - 95%)      PHYSICAL EXAM  Constitutional - NAD, Comfortable  HEENT - NCAT, EOMI  Neck - Supple, No limited ROM  Chest - CTA bilaterally, No wheeze, No rhonchi, No crackles  Cardiovascular - RRR, S1S2, No murmurs  Abdomen - BS+, Soft, NTND  Extremities - No C/C/E, No calf tenderness   Skin-no rash  Neurologic Exam -  stable                    RECENT LABS                        12.4   5.76  )-----------( 204      ( 21 Jun 2019 05:30 )             38.1     06-21    144  |  107  |  15  ----------------------------<  92  3.7   |  28  |  0.88    Ca    9.3      21 Jun 2019 05:30    TPro  6.3  /  Alb  3.3  /  TBili  0.3  /  DBili  x   /  AST  12  /  ALT  15  /  AlkPhos  108  06-21      LIVER FUNCTIONS - ( 21 Jun 2019 05:30 )  Alb: 3.3 g/dL / Pro: 6.3 g/dL / ALK PHOS: 108 U/L / ALT: 15 U/L DA / AST: 12 U/L / GGT: x                           CURRENT MEDICATIONS  MEDICATIONS  (STANDING):  buPROPion XL . 300 milliGRAM(s) Oral daily  carbidopa 25 milliGRAM(s) Oral <User Schedule>  carbidopa/levodopa  25/100 2 Tablet(s) Oral <User Schedule>  carbidopa/levodopa CR 50/200 1 Tablet(s) Oral <User Schedule>  docusate sodium 100 milliGRAM(s) Oral two times a day  donepezil 10 milliGRAM(s) Oral at bedtime  enoxaparin Injectable 40 milliGRAM(s) SubCutaneous at bedtime  folic acid 1 milliGRAM(s) Oral daily  LORazepam     Tablet 0.5 milliGRAM(s) Oral two times a day  LORazepam     Tablet 1 milliGRAM(s) Oral at bedtime  memantine 5 milliGRAM(s) Oral two times a day  midodrine 10 milliGRAM(s) Oral <User Schedule>  midodrine 5 milliGRAM(s) Oral <User Schedule>  pantoprazole    Tablet 40 milliGRAM(s) Oral before breakfast  senna 2 Tablet(s) Oral at bedtime  sertraline 200 milliGRAM(s) Oral daily    MEDICATIONS  (PRN):  acetaminophen   Tablet .. 650 milliGRAM(s) Oral every 6 hours PRN Mild Pain (1 - 3)  ondansetron    Tablet 4 milliGRAM(s) Oral every 6 hours PRN Nausea and/or Vomiting  polyethylene glycol 3350 17 Gram(s) Oral daily PRN Constipation

## 2019-06-22 PROCEDURE — 99232 SBSQ HOSP IP/OBS MODERATE 35: CPT

## 2019-06-22 RX ADMIN — ENOXAPARIN SODIUM 40 MILLIGRAM(S): 100 INJECTION SUBCUTANEOUS at 21:01

## 2019-06-22 RX ADMIN — CARBIDOPA AND LEVODOPA 2 TABLET(S): 25; 100 TABLET ORAL at 19:00

## 2019-06-22 RX ADMIN — SENNA PLUS 2 TABLET(S): 8.6 TABLET ORAL at 21:01

## 2019-06-22 RX ADMIN — Medication 0.5 MILLIGRAM(S): at 17:29

## 2019-06-22 RX ADMIN — Medication 0.5 MILLIGRAM(S): at 05:54

## 2019-06-22 RX ADMIN — CARBIDOPA AND LEVODOPA 2 TABLET(S): 25; 100 TABLET ORAL at 06:53

## 2019-06-22 RX ADMIN — QUETIAPINE FUMARATE 12.5 MILLIGRAM(S): 200 TABLET, FILM COATED ORAL at 21:01

## 2019-06-22 RX ADMIN — CARBIDOPA AND LEVODOPA 2 TABLET(S): 25; 100 TABLET ORAL at 16:09

## 2019-06-22 RX ADMIN — SERTRALINE 200 MILLIGRAM(S): 25 TABLET, FILM COATED ORAL at 12:32

## 2019-06-22 RX ADMIN — CARBIDOPA AND LEVODOPA 2 TABLET(S): 25; 100 TABLET ORAL at 13:24

## 2019-06-22 RX ADMIN — CARBIDOPA AND LEVODOPA 1 TABLET(S): 25; 100 TABLET ORAL at 20:02

## 2019-06-22 RX ADMIN — BUPROPION HYDROCHLORIDE 150 MILLIGRAM(S): 150 TABLET, EXTENDED RELEASE ORAL at 12:31

## 2019-06-22 RX ADMIN — MEMANTINE HYDROCHLORIDE 5 MILLIGRAM(S): 10 TABLET ORAL at 05:54

## 2019-06-22 RX ADMIN — PANTOPRAZOLE SODIUM 40 MILLIGRAM(S): 20 TABLET, DELAYED RELEASE ORAL at 06:52

## 2019-06-22 RX ADMIN — MEMANTINE HYDROCHLORIDE 5 MILLIGRAM(S): 10 TABLET ORAL at 17:29

## 2019-06-22 RX ADMIN — Medication 1 MILLIGRAM(S): at 21:01

## 2019-06-22 RX ADMIN — Medication 25 MILLIGRAM(S): at 06:53

## 2019-06-22 RX ADMIN — Medication 25 MILLIGRAM(S): at 13:24

## 2019-06-22 RX ADMIN — CARBIDOPA AND LEVODOPA 2 TABLET(S): 25; 100 TABLET ORAL at 09:59

## 2019-06-22 RX ADMIN — Medication 100 MILLIGRAM(S): at 17:29

## 2019-06-22 RX ADMIN — Medication 25 MILLIGRAM(S): at 09:59

## 2019-06-22 RX ADMIN — DONEPEZIL HYDROCHLORIDE 10 MILLIGRAM(S): 10 TABLET, FILM COATED ORAL at 21:02

## 2019-06-22 RX ADMIN — Medication 100 MILLIGRAM(S): at 05:54

## 2019-06-22 RX ADMIN — Medication 25 MILLIGRAM(S): at 16:09

## 2019-06-22 RX ADMIN — Medication 25 MILLIGRAM(S): at 19:00

## 2019-06-22 RX ADMIN — Medication 1 MILLIGRAM(S): at 12:32

## 2019-06-22 NOTE — PROGRESS NOTE ADULT - SUBJECTIVE AND OBJECTIVE BOX
No overnight events.  Patient feels fatigued. Feels she did not sleep well.   Reports no pain at this time.     REVIEW OF SYSTEMS  Constitutional - No fever,  +fatigue  HEENT - No vertigo, No neck pain  Neurological - No headaches, +loss of strength  Musculoskeletal - No joint pain, No joint swelling, No muscle pain    VITALS  T(C): 36.9 (06-22-19 @ 07:58), Max: 37.1 (06-21-19 @ 19:28)  HR: 81 (06-22-19 @ 07:58) (77 - 84)  BP: 125/78 (06-22-19 @ 07:58) (125/78 - 137/79)  RR: 14 (06-22-19 @ 07:58) (14 - 14)  SpO2: 100% (06-22-19 @ 07:58) (96% - 100%)  Wt(kg): --       MEDICATIONS   acetaminophen   Tablet .. 650 milliGRAM(s) every 6 hours PRN  buPROPion XL . 150 milliGRAM(s) daily  carbidopa 25 milliGRAM(s) <User Schedule>  carbidopa/levodopa  25/100 2 Tablet(s) <User Schedule>  carbidopa/levodopa CR 50/200 1 Tablet(s) <User Schedule>  docusate sodium 100 milliGRAM(s) two times a day  donepezil 10 milliGRAM(s) at bedtime  enoxaparin Injectable 40 milliGRAM(s) at bedtime  folic acid 1 milliGRAM(s) daily  LORazepam     Tablet 0.5 milliGRAM(s) two times a day  LORazepam     Tablet 1 milliGRAM(s) at bedtime  memantine 5 milliGRAM(s) two times a day  midodrine 10 milliGRAM(s) <User Schedule>  midodrine 5 milliGRAM(s) <User Schedule>  ondansetron    Tablet 4 milliGRAM(s) every 6 hours PRN  pantoprazole    Tablet 40 milliGRAM(s) before breakfast  polyethylene glycol 3350 17 Gram(s) daily PRN  QUEtiapine 12.5 milliGRAM(s) at bedtime  senna 2 Tablet(s) at bedtime  sertraline 200 milliGRAM(s) daily      RECENT LABS/IMAGING                        12.4   5.76  )-----------( 204      ( 21 Jun 2019 05:30 )             38.1     06-21    144  |  107  |  15  ----------------------------<  92  3.7   |  28  |  0.88    Ca    9.3      21 Jun 2019 05:30    TPro  6.3  /  Alb  3.3  /  TBili  0.3  /  DBili  x   /  AST  12  /  ALT  15  /  AlkPhos  108  06-21                  ---------  PHYSICAL EXAM  Constitutional - NAD, Comfortable  Pulm - Breathing comfortably, No wheezing  Abd - Soft, NTND  Extremities - No edema, No calf tenderness  Neurologic Exam -                    Cognitive - Awake, Alert  Psychiatric - Fatigued    ASSESSMENT/PLAN  74y Female with functional deficits after a fall with PD  PD - Carbidopa, Sinamet  Orthostasis - Midodrine  Mood - Wellbutrin, Ativan, Seroquel  Memory - Aricept  Constipation - Colace, Senna, Miralax  Pain - Tylenol PRN  DVT PPX - Lovenox    Continue 3hrs a day of comprehensive rehab program.

## 2019-06-23 PROCEDURE — 99232 SBSQ HOSP IP/OBS MODERATE 35: CPT

## 2019-06-23 RX ADMIN — Medication 100 MILLIGRAM(S): at 17:20

## 2019-06-23 RX ADMIN — CARBIDOPA AND LEVODOPA 1 TABLET(S): 25; 100 TABLET ORAL at 19:59

## 2019-06-23 RX ADMIN — MEMANTINE HYDROCHLORIDE 5 MILLIGRAM(S): 10 TABLET ORAL at 05:03

## 2019-06-23 RX ADMIN — Medication 25 MILLIGRAM(S): at 07:04

## 2019-06-23 RX ADMIN — CARBIDOPA AND LEVODOPA 2 TABLET(S): 25; 100 TABLET ORAL at 13:11

## 2019-06-23 RX ADMIN — CARBIDOPA AND LEVODOPA 2 TABLET(S): 25; 100 TABLET ORAL at 19:01

## 2019-06-23 RX ADMIN — BUPROPION HYDROCHLORIDE 150 MILLIGRAM(S): 150 TABLET, EXTENDED RELEASE ORAL at 11:59

## 2019-06-23 RX ADMIN — CARBIDOPA AND LEVODOPA 2 TABLET(S): 25; 100 TABLET ORAL at 16:10

## 2019-06-23 RX ADMIN — Medication 25 MILLIGRAM(S): at 13:11

## 2019-06-23 RX ADMIN — Medication 25 MILLIGRAM(S): at 19:00

## 2019-06-23 RX ADMIN — PANTOPRAZOLE SODIUM 40 MILLIGRAM(S): 20 TABLET, DELAYED RELEASE ORAL at 07:04

## 2019-06-23 RX ADMIN — Medication 25 MILLIGRAM(S): at 16:10

## 2019-06-23 RX ADMIN — Medication 0.5 MILLIGRAM(S): at 05:03

## 2019-06-23 RX ADMIN — Medication 100 MILLIGRAM(S): at 05:04

## 2019-06-23 RX ADMIN — Medication 0.5 MILLIGRAM(S): at 17:20

## 2019-06-23 RX ADMIN — MEMANTINE HYDROCHLORIDE 5 MILLIGRAM(S): 10 TABLET ORAL at 17:21

## 2019-06-23 RX ADMIN — SENNA PLUS 2 TABLET(S): 8.6 TABLET ORAL at 21:17

## 2019-06-23 RX ADMIN — MIDODRINE HYDROCHLORIDE 5 MILLIGRAM(S): 2.5 TABLET ORAL at 17:21

## 2019-06-23 RX ADMIN — CARBIDOPA AND LEVODOPA 2 TABLET(S): 25; 100 TABLET ORAL at 07:04

## 2019-06-23 RX ADMIN — Medication 25 MILLIGRAM(S): at 10:01

## 2019-06-23 RX ADMIN — SERTRALINE 200 MILLIGRAM(S): 25 TABLET, FILM COATED ORAL at 11:59

## 2019-06-23 RX ADMIN — ENOXAPARIN SODIUM 40 MILLIGRAM(S): 100 INJECTION SUBCUTANEOUS at 21:17

## 2019-06-23 RX ADMIN — CARBIDOPA AND LEVODOPA 2 TABLET(S): 25; 100 TABLET ORAL at 10:01

## 2019-06-23 RX ADMIN — QUETIAPINE FUMARATE 12.5 MILLIGRAM(S): 200 TABLET, FILM COATED ORAL at 21:17

## 2019-06-23 RX ADMIN — DONEPEZIL HYDROCHLORIDE 10 MILLIGRAM(S): 10 TABLET, FILM COATED ORAL at 21:17

## 2019-06-23 RX ADMIN — Medication 1 MILLIGRAM(S): at 21:17

## 2019-06-23 RX ADMIN — Medication 1 MILLIGRAM(S): at 11:59

## 2019-06-23 NOTE — CONSULT NOTE ADULT - SUBJECTIVE AND OBJECTIVE BOX
Psychiatry Consultation-Liaison Follow-up Note:  73yo  Female  Encounter: on BIU  Chart reviewed.  	  Case Discussed with: Nursing staff/Treatment Team/Attending of Record.     Chief Complaint:  "Same old, same old."    Interval History:  HPI:  This is a 73 y/o female with PMHx advanced Parkinson's disease, orthostatic hypotension and anxiety brought in by her  to [facility] on 6/6/19 for ongoing body shaking at home and worsening unsteady gait. Patient fell 8 days ago and sustained a right wrist fracture that was splinted in the ED. On 6/5/19, per outpatient Neuro recs, Midodrine was increased to 10mg in AM along with continued 5mg in evening for severe orthostasis.  notes patient experiencing body shaking with position changes, is unaware of this and becomes extremely weak afterwards. No prior history of seizures. No head trauma, tongue biting or bowel incontinence. Patient is intermittently incontinent of urine and was treated for ESBL E. coli UTI in April. SInce then has not had recurrent UTI's.     In the ED on the 6th, notable labs included normal CBC, CMP and UA with concerns for pyuria. CT head with brain atrophy, CXR clear. Patient admitted.     During hospitalization at , neurology consulted. EEG done and did not show any epileptiform activity.  Recommended to continue current regimen of Parkinson's medications - Sinemet 25/100 2 tabs 5 times per day, carbidopa 25 mg five times per day, Sinemet CR 50/200 qhs and to continue Midodrine with consideration to increase to 10 mg BID if needed.  Urine culture sent and shows >100, 000 e coli +ESBL. ID consulted and started patient on meropenem 500mg [id number]. Patient to continue meropenem until end of day on 6/13.   Patient medically stable and accepted into the Parkinson's program here at [facility] under Dr Coello's supervision on 6/10/19. (10 Carlos 2019 15:21)    Symptom progression/resolution:  Psychiatry asked to see patient due to her anxiety and hx of depression. She also has hx of visual hallucinations as well as REM sleep disturbance.   Pt has a tendency to perseverate about her spouse, causing a great deal of distress and anxiety when  from him. As per staff he left approximately 15 min prior to my interview.   She was again upset stating " He knows about my anxiety, how can he do this to me? He knows I can't stand being alone." Pt states however today, when redirected that it is something she will have to get used to. Sleep and appetite are fair, as is her energy level, today appears less pessimistic compared to her initial evaluation, medication changes were to lower her Wellbutrin ( which is dopaminergic) and start a low dose of Seroquel at hs. So far tolerating it well without any worsening of Parkinson's  motor symptoms.     MEDICATIONS  (STANDING):  buPROPion XL . 150 milliGRAM(s) Oral daily  carbidopa 25 milliGRAM(s) Oral <User Schedule>  carbidopa/levodopa  25/100 2 Tablet(s) Oral <User Schedule>  carbidopa/levodopa CR 50/200 1 Tablet(s) Oral <User Schedule>  docusate sodium 100 milliGRAM(s) Oral two times a day  donepezil 10 milliGRAM(s) Oral at bedtime  enoxaparin Injectable 40 milliGRAM(s) SubCutaneous at bedtime  folic acid 1 milliGRAM(s) Oral daily  LORazepam     Tablet 1 milliGRAM(s) Oral at bedtime  LORazepam     Tablet 0.5 milliGRAM(s) Oral two times a day  memantine 5 milliGRAM(s) Oral two times a day  midodrine 10 milliGRAM(s) Oral <User Schedule>  midodrine 5 milliGRAM(s) Oral <User Schedule>  pantoprazole    Tablet 40 milliGRAM(s) Oral before breakfast  QUEtiapine 12.5 milliGRAM(s) Oral at bedtime  senna 2 Tablet(s) Oral at bedtime  sertraline 200 milliGRAM(s) Oral daily    MEDICATIONS  (PRN):  acetaminophen   Tablet .. 650 milliGRAM(s) Oral every 6 hours PRN Mild Pain (1 - 3)  ondansetron    Tablet 4 milliGRAM(s) Oral every 6 hours PRN Nausea and/or Vomiting  polyethylene glycol 3350 17 Gram(s) Oral daily PRN Constipation      Mental Status Examination:  General Appearance: Blonde female appears younger than stated age, poor eye contact.   Remarkable Features: Sitting in wheel chair with brace on right wrist.   Psychomotor State: Calm   Abnormal movements and posture: deficits as per physiatry and neurology notes.   Social interaction and affect: Polite , but does not elaborate, focused on her spouse having left the unit, affect anxious.   Speech: Fluent.   Cognition, perceptual abnormalities: today appeared less internally preoccupied, did not insist on checking the parking lot for her spouse.   Consciousness: alert  Orientation: to person and place.   Memory: Recent/Past/Remote: can give accurate autobiographical information, short term and recent memory appear impaired.   Attention: distracted, not sustained for long periods, ended the interview early. " Thanks for stopping by." Then would not continue to talk.   Naming/Repetition/ Comprehension: Grossly intact, but would rely on speech notes for accuracy.   Insight/Judgment: Limited, adequate for basic needs.     Comprehensive Metabolic Panel (06.21.19 @ 05:30)    Sodium, Serum: 144 mmol/L    Potassium, Serum: 3.7 mmol/L    Chloride, Serum: 107 mmol/L    Carbon Dioxide, Serum: 28 mmol/L    Anion Gap, Serum: 9 mmol/L    Blood Urea Nitrogen, Serum: 15 mg/dL    Creatinine, Serum: 0.88 mg/dL    Glucose, Serum: 92 mg/dL    Calcium, Total Serum: 9.3 mg/dL    Protein Total, Serum: 6.3 g/dL    Albumin, Serum: 3.3 g/dL    Bilirubin Total, Serum: 0.3 mg/dL    Alkaline Phosphatase, Serum: 108 U/L    Aspartate Aminotransferase (AST/SGOT): 12 U/L    Alanine Aminotransferase (ALT/SGPT): 15 U/L DA           Vital Signs Last 24 Hrs:   T(C): 36.7 (23 Jun 2019 09:35), Max: 37 (22 Jun 2019 19:49)  T(F): 98.1 (23 Jun 2019 09:35), Max: 98.6 (22 Jun 2019 19:49)  HR: 76 (23 Jun 2019 09:35) (71 - 77)  BP: 115/78 (23 Jun 2019 09:35) (115/78 - 155/77)  BP(mean): --  RR: 14 (23 Jun 2019 09:35) (14 - 14)  SpO2: 98% (23 Jun 2019 09:35) (95% - 98%)    Psychiatric Diagnosis: MDD recurrent per collateral information, Parkinson's Disease with dementia.  Anxiety disorder NOS    Recommendations: As above, continue to monitor response to Seroquel, Wellbutrin decreased to 150 mg daily ( which should help decrease tremor) continue Zoloft already maxed out to 200 mg.     Other Treatment considerations-  Level of supervision: Routine    Hospital course Follow-up:   (will follow with you)-

## 2019-06-24 DIAGNOSIS — S62.91XA UNSPECIFIED FRACTURE OF RIGHT WRIST AND HAND, INITIAL ENCOUNTER FOR CLOSED FRACTURE: ICD-10-CM

## 2019-06-24 DIAGNOSIS — G20 PARKINSON'S DISEASE: ICD-10-CM

## 2019-06-24 DIAGNOSIS — B96.20 UNSPECIFIED ESCHERICHIA COLI [E. COLI] AS THE CAUSE OF DISEASES CLASSIFIED ELSEWHERE: ICD-10-CM

## 2019-06-24 DIAGNOSIS — N39.0 URINARY TRACT INFECTION, SITE NOT SPECIFIED: ICD-10-CM

## 2019-06-24 DIAGNOSIS — R26.81 UNSTEADINESS ON FEET: ICD-10-CM

## 2019-06-24 DIAGNOSIS — I95.1 ORTHOSTATIC HYPOTENSION: ICD-10-CM
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
## 2019-06-24 DIAGNOSIS — G93.41 METABOLIC ENCEPHALOPATHY: ICD-10-CM

## 2019-06-24 DIAGNOSIS — F41.9 ANXIETY DISORDER, UNSPECIFIED: ICD-10-CM

## 2019-06-24 LAB
ALBUMIN SERPL ELPH-MCNC: 3.3 G/DL — SIGNIFICANT CHANGE UP (ref 3.3–5)
ALP SERPL-CCNC: 97 U/L — SIGNIFICANT CHANGE UP (ref 40–120)
ALT FLD-CCNC: 13 U/L DA — SIGNIFICANT CHANGE UP (ref 10–45)
ANION GAP SERPL CALC-SCNC: 8 MMOL/L — SIGNIFICANT CHANGE UP (ref 5–17)
AST SERPL-CCNC: 14 U/L — SIGNIFICANT CHANGE UP (ref 10–40)
BILIRUB SERPL-MCNC: 0.3 MG/DL — SIGNIFICANT CHANGE UP (ref 0.2–1.2)
BUN SERPL-MCNC: 21 MG/DL — SIGNIFICANT CHANGE UP (ref 7–23)
CALCIUM SERPL-MCNC: 9.4 MG/DL — SIGNIFICANT CHANGE UP (ref 8.4–10.5)
CHLORIDE SERPL-SCNC: 106 MMOL/L — SIGNIFICANT CHANGE UP (ref 96–108)
CO2 SERPL-SCNC: 27 MMOL/L — SIGNIFICANT CHANGE UP (ref 22–31)
CREAT SERPL-MCNC: 0.96 MG/DL — SIGNIFICANT CHANGE UP (ref 0.5–1.3)
GLUCOSE SERPL-MCNC: 86 MG/DL — SIGNIFICANT CHANGE UP (ref 70–99)
HCT VFR BLD CALC: 41.1 % — SIGNIFICANT CHANGE UP (ref 34.5–45)
HGB BLD-MCNC: 13.3 G/DL — SIGNIFICANT CHANGE UP (ref 11.5–15.5)
MCHC RBC-ENTMCNC: 30 PG — SIGNIFICANT CHANGE UP (ref 27–34)
MCHC RBC-ENTMCNC: 32.4 GM/DL — SIGNIFICANT CHANGE UP (ref 32–36)
MCV RBC AUTO: 92.6 FL — SIGNIFICANT CHANGE UP (ref 80–100)
NRBC # BLD: 0 /100 WBCS — SIGNIFICANT CHANGE UP (ref 0–0)
PLATELET # BLD AUTO: 203 K/UL — SIGNIFICANT CHANGE UP (ref 150–400)
POTASSIUM SERPL-MCNC: 4.1 MMOL/L — SIGNIFICANT CHANGE UP (ref 3.5–5.3)
POTASSIUM SERPL-SCNC: 4.1 MMOL/L — SIGNIFICANT CHANGE UP (ref 3.5–5.3)
PROT SERPL-MCNC: 6.3 G/DL — SIGNIFICANT CHANGE UP (ref 6–8.3)
RBC # BLD: 4.44 M/UL — SIGNIFICANT CHANGE UP (ref 3.8–5.2)
RBC # FLD: 13.3 % — SIGNIFICANT CHANGE UP (ref 10.3–14.5)
SODIUM SERPL-SCNC: 141 MMOL/L — SIGNIFICANT CHANGE UP (ref 135–145)
WBC # BLD: 7.13 K/UL — SIGNIFICANT CHANGE UP (ref 3.8–10.5)
WBC # FLD AUTO: 7.13 K/UL — SIGNIFICANT CHANGE UP (ref 3.8–10.5)

## 2019-06-24 PROCEDURE — 99233 SBSQ HOSP IP/OBS HIGH 50: CPT

## 2019-06-24 RX ADMIN — Medication 25 MILLIGRAM(S): at 10:11

## 2019-06-24 RX ADMIN — MEMANTINE HYDROCHLORIDE 5 MILLIGRAM(S): 10 TABLET ORAL at 05:35

## 2019-06-24 RX ADMIN — CARBIDOPA AND LEVODOPA 2 TABLET(S): 25; 100 TABLET ORAL at 10:11

## 2019-06-24 RX ADMIN — CARBIDOPA AND LEVODOPA 2 TABLET(S): 25; 100 TABLET ORAL at 16:27

## 2019-06-24 RX ADMIN — Medication 1 MILLIGRAM(S): at 21:24

## 2019-06-24 RX ADMIN — CARBIDOPA AND LEVODOPA 2 TABLET(S): 25; 100 TABLET ORAL at 13:09

## 2019-06-24 RX ADMIN — Medication 0.5 MILLIGRAM(S): at 05:35

## 2019-06-24 RX ADMIN — Medication 25 MILLIGRAM(S): at 19:15

## 2019-06-24 RX ADMIN — CARBIDOPA AND LEVODOPA 2 TABLET(S): 25; 100 TABLET ORAL at 19:15

## 2019-06-24 RX ADMIN — Medication 25 MILLIGRAM(S): at 06:47

## 2019-06-24 RX ADMIN — Medication 25 MILLIGRAM(S): at 16:27

## 2019-06-24 RX ADMIN — CARBIDOPA AND LEVODOPA 1 TABLET(S): 25; 100 TABLET ORAL at 20:19

## 2019-06-24 RX ADMIN — SENNA PLUS 2 TABLET(S): 8.6 TABLET ORAL at 21:22

## 2019-06-24 RX ADMIN — Medication 1 MILLIGRAM(S): at 12:55

## 2019-06-24 RX ADMIN — MEMANTINE HYDROCHLORIDE 5 MILLIGRAM(S): 10 TABLET ORAL at 18:10

## 2019-06-24 RX ADMIN — Medication 0.5 MILLIGRAM(S): at 18:09

## 2019-06-24 RX ADMIN — Medication 100 MILLIGRAM(S): at 18:10

## 2019-06-24 RX ADMIN — PANTOPRAZOLE SODIUM 40 MILLIGRAM(S): 20 TABLET, DELAYED RELEASE ORAL at 05:36

## 2019-06-24 RX ADMIN — QUETIAPINE FUMARATE 12.5 MILLIGRAM(S): 200 TABLET, FILM COATED ORAL at 21:22

## 2019-06-24 RX ADMIN — Medication 25 MILLIGRAM(S): at 13:09

## 2019-06-24 RX ADMIN — BUPROPION HYDROCHLORIDE 150 MILLIGRAM(S): 150 TABLET, EXTENDED RELEASE ORAL at 12:52

## 2019-06-24 RX ADMIN — DONEPEZIL HYDROCHLORIDE 10 MILLIGRAM(S): 10 TABLET, FILM COATED ORAL at 21:24

## 2019-06-24 RX ADMIN — ENOXAPARIN SODIUM 40 MILLIGRAM(S): 100 INJECTION SUBCUTANEOUS at 21:24

## 2019-06-24 RX ADMIN — CARBIDOPA AND LEVODOPA 2 TABLET(S): 25; 100 TABLET ORAL at 06:47

## 2019-06-24 RX ADMIN — SERTRALINE 200 MILLIGRAM(S): 25 TABLET, FILM COATED ORAL at 12:53

## 2019-06-24 NOTE — PROGRESS NOTE ADULT - PROBLEM SELECTOR PLAN 2
Wellbutrin. Zoloft. Ativan  Psychiatry and neuropsychology are following  Case discussed with Psychiatry and primary MD neurologist. In order to control delusions/ psychotic  features on small dose Seroquel at bedtime and Wellbutrin dose. Still very anxious and depressed. Will ask psychiatry to re-evaluate. Wellbutrin held. Zoloft held. Start Lexapro 10mg on 6/25. Discussed with Dr Ludin Gr. Ativan continues.   Psychiatry and neuropsychology are following  In order to control delusions/psychotic features on small dose Seroquel at bedtime. Still very anxious and depressed.

## 2019-06-24 NOTE — PROGRESS NOTE ADULT - SUBJECTIVE AND OBJECTIVE BOX
CHIEF COMPLAINT:      HISTORY OF PRESENT ILLNESS  HPI:  This is a 73 y/o female with PMHx advanced Parkinson's disease, orthostatic hypotension and anxiety brought in by her  to [facility] on 6/6/19 for ongoing body shaking at home and worsening unsteady gait. Patient fell 8 days ago and sustained a right wrist fracture that was splinted in the ED. On 6/5/19, per outpatient Neuro recs, Midodrine was increased to 10mg in AM along with continued 5mg in evening for severe orthostasis.  notes patient experiencing body shaking with position changes, is unaware of this and becomes extremely weak afterwards. No prior history of seizures. No head trauma, tongue biting or bowel incontinence. Patient is intermittently incontinent of urine and was treated for ESBL E. coli UTI in April. SInce then has not had recurrent UTI's.     In the ED on the 6th, notable labs included normal CBC, CMP and UA with concerns for pyuria. CT head with brain atrophy, CXR clear. Patient admitted.     During hospitalization at , neurology consulted. EEG done and did not show any epileptiform activity.  Recommended to continue current regimen of Parkinson's medications - Sinemet 25/100 2 tabs 5 times per day, carbidopa 25 mg five times per day, Sinemet CR 50/200 qhs and to continue Midodrine with consideration to increase to 10 mg BID if needed.  Urine culture sent and shows >100, 000 e coli +ESBL. ID consulted and started patient on meropenem 500mg [id number]. Patient to continue meropenem until end of day on 6/13.   Patient medically stable and accepted into the Parkinson's program here at [facility] under Dr Coello's supervision on 6/10/19. (10 Carlos 2019 15:21)      [facility]  PAST MEDICAL & SURGICAL HISTORY:  Parkinson disease  Anxiety  Detached Retina, Left: and macular hole 5/2011  S/P Cataract Surgery: 2011  Macular Hole: 2010  Retinal Tear: 2003  x -        REVIEW OF SYMPTOMS  [X] Constitutional WNL     [X] Cardio WNL            [X] Resp WNL           [X] GI WNL                          [X]  WNL                   [X] Heme WNL              [X] Endo WNL                     [X] Skin WNL                 [X] MSK WNL            [X] Neuro WNL                   [X] Cognitive WNL        [X] Psych WNL      VITALS  Vital Signs Last 24 Hrs  T(C): 37.2 (24 Jun 2019 07:21), Max: 37.2 (23 Jun 2019 20:38)  T(F): 98.9 (24 Jun 2019 07:21), Max: 98.9 (23 Jun 2019 20:38)  HR: 71 (24 Jun 2019 07:21) (71 - 84)  BP: 148/80 (24 Jun 2019 07:21) (148/80 - 155/88)  BP(mean): --  RR: 14 (24 Jun 2019 07:21) (14 - 14)  SpO2: 96% (24 Jun 2019 07:21) (95% - 96%)      PHYSICAL EXAM  Constitutional - NAD, Comfortable  HEENT - NCAT, EOMI  Neck - Supple, No limited ROM  Chest - CTA bilaterally, No wheeze, No rhonchi, No crackles  Cardiovascular - RRR, S1S2, No murmurs  Abdomen - BS+, Soft, NTND  Extremities - No C/C/E, No calf tenderness   Skin-no rash  Woumds-      Neurologic Exam -                   HIF  - Awake, Alert, AAO to self, place, date, year, situation      No aphasia, normal attention, normal concentration, no apraxia, agnosia     Cranial Nerves - CN 2-12 intact     Motor - No focal deficits                            Sensory - Intact to LT,PP,Temprature,JPS, vibration                      Cortical sensory modalities intact     Reflexes - DTR Intact     Toes are flexor     Coordination/dysmetria - FTN intact             Balance - WNL Static and dynamic     Psychiatric - Mood stable, Affect WNL         FUNCTIONAL PROGRESS  Gait -   ADLs -   Transfers -  Functional transfer -     RECENT LABS                        13.3   7.13  )-----------( 203      ( 24 Jun 2019 05:10 )             41.1     06-24    141  |  106  |  21  ----------------------------<  86  4.1   |  27  |  0.96    Ca    9.4      24 Jun 2019 05:10    TPro  6.3  /  Alb  3.3  /  TBili  0.3  /  DBili  x   /  AST  14  /  ALT  13  /  AlkPhos  97  06-24      LIVER FUNCTIONS - ( 24 Jun 2019 05:10 )  Alb: 3.3 g/dL / Pro: 6.3 g/dL / ALK PHOS: 97 U/L / ALT: 13 U/L DA / AST: 14 U/L / GGT: x                           RADIOLOGY/OTHER RESULTS      CURRENT MEDICATIONS  MEDICATIONS  (STANDING):  buPROPion XL . 150 milliGRAM(s) Oral daily  carbidopa 25 milliGRAM(s) Oral <User Schedule>  carbidopa/levodopa  25/100 2 Tablet(s) Oral <User Schedule>  carbidopa/levodopa CR 50/200 1 Tablet(s) Oral <User Schedule>  docusate sodium 100 milliGRAM(s) Oral two times a day  donepezil 10 milliGRAM(s) Oral at bedtime  enoxaparin Injectable 40 milliGRAM(s) SubCutaneous at bedtime  folic acid 1 milliGRAM(s) Oral daily  LORazepam     Tablet 1 milliGRAM(s) Oral at bedtime  LORazepam     Tablet 0.5 milliGRAM(s) Oral two times a day  memantine 5 milliGRAM(s) Oral two times a day  midodrine 10 milliGRAM(s) Oral <User Schedule>  midodrine 5 milliGRAM(s) Oral <User Schedule>  pantoprazole    Tablet 40 milliGRAM(s) Oral before breakfast  QUEtiapine 12.5 milliGRAM(s) Oral at bedtime  senna 2 Tablet(s) Oral at bedtime  sertraline 200 milliGRAM(s) Oral daily    MEDICATIONS  (PRN):  acetaminophen   Tablet .. 650 milliGRAM(s) Oral every 6 hours PRN Mild Pain (1 - 3)  ondansetron    Tablet 4 milliGRAM(s) Oral every 6 hours PRN Nausea and/or Vomiting  polyethylene glycol 3350 17 Gram(s) Oral daily PRN Constipation      ASSESSMENT & PLAN          GI/Bowel Management - Dulcolax PRN, Fleet PRN   Management - Toilet Q2  Skin - Turn Q2  Pain - Tylenol PRN  DVT PPX - TEDs, SCDs  Diet -     Continue comprehensive acute rehab program consisting of 3hrs/day of OT/PT and SLP. CHIEF COMPLAINT: 'Upset, nervous, anxious'.      HISTORY OF PRESENT ILLNESS  This is a 73 y/o female with PMHx advanced Parkinson's disease, orthostatic hypotension and anxiety brought in by her  to [facility] on 6/6/19 for ongoing body shaking at home and worsening unsteady gait. Patient fell 8 days ago and sustained a right wrist fracture that was splinted in the ED. On 6/5/19, per outpatient Neuro recs, Midodrine was increased to 10mg in AM along with continued 5mg in evening for severe orthostasis.  notes patient experiencing body shaking with position changes, is unaware of this and becomes extremely weak afterwards. No prior history of seizures. No head trauma, tongue biting or bowel incontinence. Patient is intermittently incontinent of urine and was treated for ESBL E. coli UTI in April. SInce then has not had recurrent UTI's.     In the ED on the 6th, notable labs included normal CBC, CMP and UA with concerns for pyuria. CT head with brain atrophy, CXR clear. Patient admitted.     During hospitalization at , neurology consulted. EEG done and did not show any epileptiform activity.  Recommended to continue current regimen of Parkinson's medications - Sinemet 25/100 2 tabs 5 times per day, carbidopa 25 mg five times per day, Sinemet CR 50/200 qhs and to continue Midodrine with consideration to increase to 10 mg BID if needed.  Urine culture sent and shows >100, 000 e coli +ESBL. ID consulted and started patient on meropenem 500mg [id number]. Patient to continue meropenem until end of day on 6/13.   Patient medically stable and accepted into the Parkinson's program here at [facility] under Dr Coello's supervision on 6/10/19. (10 Carlos 2019 15:21)      PAST MEDICAL & SURGICAL HISTORY:  Parkinson disease  Anxiety  Detached Retina, Left: and macular hole 5/2011  S/P Cataract Surgery: 2011  Macular Hole: 2010  Retinal Tear: 2003        REVIEW OF SYMPTOMS  [X] Constitutional WNL     [X] Cardio WNL            [X] Resp WNL           [X] GI WNL                          [X]  WNL                   [X] Heme WNL              [X] Endo WNL                     [X] Skin WNL                   VITALS  Vital Signs Last 24 Hrs  T(C): 37.2 (24 Jun 2019 07:21), Max: 37.2 (23 Jun 2019 20:38)  T(F): 98.9 (24 Jun 2019 07:21), Max: 98.9 (23 Jun 2019 20:38)  HR: 71 (24 Jun 2019 07:21) (71 - 84)  BP: 148/80 (24 Jun 2019 07:21) (148/80 - 155/88)  BP(mean): --  RR: 14 (24 Jun 2019 07:21) (14 - 14)  SpO2: 96% (24 Jun 2019 07:21) (95% - 96%)      PHYSICAL EXAM  Constitutional - Emotional, anxious  HEENT - NCAT, EOMI  Neck - Supple, No limited ROM  Chest - CTA bilaterally, No wheeze, No rhonchi, No crackles  Cardiovascular - RRR, S1S2, No murmurs  Abdomen - BS+, Soft, NTND  Extremities - No C/C/E, No calf tenderness   Skin-no rash  Wounds-na      Neurologic Exam -  no new deficits                  Balance - impaired     Psychiatric - Anxious, depressed         FUNCTIONAL PROGRESS  Gait - max A  ADLs - max A  Transfers - max A  Functional transfer - max A    RECENT LABS                        13.3   7.13  )-----------( 203      ( 24 Jun 2019 05:10 )             41.1     06-24    141  |  106  |  21  ----------------------------<  86  4.1   |  27  |  0.96    Ca    9.4      24 Jun 2019 05:10    TPro  6.3  /  Alb  3.3  /  TBili  0.3  /  DBili  x   /  AST  14  /  ALT  13  /  AlkPhos  97  06-24      LIVER FUNCTIONS - ( 24 Jun 2019 05:10 )  Alb: 3.3 g/dL / Pro: 6.3 g/dL / ALK PHOS: 97 U/L / ALT: 13 U/L DA / AST: 14 U/L / GGT: x                           RADIOLOGY/OTHER RESULTS      CURRENT MEDICATIONS  MEDICATIONS  (STANDING):  buPROPion XL . 150 milliGRAM(s) Oral daily  carbidopa 25 milliGRAM(s) Oral <User Schedule>  carbidopa/levodopa  25/100 2 Tablet(s) Oral <User Schedule>  carbidopa/levodopa CR 50/200 1 Tablet(s) Oral <User Schedule>  docusate sodium 100 milliGRAM(s) Oral two times a day  donepezil 10 milliGRAM(s) Oral at bedtime  enoxaparin Injectable 40 milliGRAM(s) SubCutaneous at bedtime  folic acid 1 milliGRAM(s) Oral daily  LORazepam     Tablet 1 milliGRAM(s) Oral at bedtime  LORazepam     Tablet 0.5 milliGRAM(s) Oral two times a day  memantine 5 milliGRAM(s) Oral two times a day  midodrine 10 milliGRAM(s) Oral <User Schedule>  midodrine 5 milliGRAM(s) Oral <User Schedule>  pantoprazole    Tablet 40 milliGRAM(s) Oral before breakfast  QUEtiapine 12.5 milliGRAM(s) Oral at bedtime  senna 2 Tablet(s) Oral at bedtime  sertraline 200 milliGRAM(s) Oral daily    MEDICATIONS  (PRN):  acetaminophen   Tablet .. 650 milliGRAM(s) Oral every 6 hours PRN Mild Pain (1 - 3)  ondansetron    Tablet 4 milliGRAM(s) Oral every 6 hours PRN Nausea and/or Vomiting  polyethylene glycol 3350 17 Gram(s) Oral daily PRN Constipation      ASSESSMENT & PLAN      GI/Bowel Management - Colace   Management - Toilet Q2  Skin - Turn Q2  Pain - Tylenol PRN  DVT PPX - Lovenox  Diet - reg c thins    Continue comprehensive acute rehab program consisting of 3hrs/day of OT/PT and SLP.

## 2019-06-24 NOTE — PROGRESS NOTE ADULT - ASSESSMENT
This is a 73 y/o female with PMHx advanced Parkinson's disease, orthostatic hypotension and anxiety, recent fall with +right wrist fx, brought in by her  to [facility] on 6/6/19 for ongoing body shaking at home and worsening unsteady gait, found to have e coli ESBL+ UTI and placed on meropenem, being admitted to the Parkinson's program here at [facility] for acute inpatient treatment of Parkinson's disease, gait and ADL dysfunction.

## 2019-06-24 NOTE — PROGRESS NOTE ADULT - PROBLEM SELECTOR PLAN 3
Orthopedic input  appreciated.   Repeat wrist xrays reviewed. Will continue NWBS for total of 6 weeks as per Orthopedic surgery. Splint is in place. Orthopedic input appreciated.   Repeat wrist xrays reviewed. Will continue NWBS for total of 6 weeks as per Orthopedic surgery. Splint is in place.

## 2019-06-25 PROCEDURE — 99232 SBSQ HOSP IP/OBS MODERATE 35: CPT

## 2019-06-25 PROCEDURE — 90832 PSYTX W PT 30 MINUTES: CPT

## 2019-06-25 PROCEDURE — 99233 SBSQ HOSP IP/OBS HIGH 50: CPT

## 2019-06-25 RX ORDER — QUETIAPINE FUMARATE 200 MG/1
25 TABLET, FILM COATED ORAL AT BEDTIME
Refills: 0 | Status: DISCONTINUED | OUTPATIENT
Start: 2019-06-25 | End: 2019-06-27

## 2019-06-25 RX ORDER — QUETIAPINE FUMARATE 200 MG/1
50 TABLET, FILM COATED ORAL AT BEDTIME
Refills: 0 | Status: DISCONTINUED | OUTPATIENT
Start: 2019-06-25 | End: 2019-06-25

## 2019-06-25 RX ORDER — NYSTATIN CREAM 100000 [USP'U]/G
1 CREAM TOPICAL
Refills: 0 | Status: DISCONTINUED | OUTPATIENT
Start: 2019-06-25 | End: 2019-06-27

## 2019-06-25 RX ORDER — ESCITALOPRAM OXALATE 10 MG/1
10 TABLET, FILM COATED ORAL DAILY
Refills: 0 | Status: DISCONTINUED | OUTPATIENT
Start: 2019-06-25 | End: 2019-06-27

## 2019-06-25 RX ADMIN — CARBIDOPA AND LEVODOPA 1 TABLET(S): 25; 100 TABLET ORAL at 20:28

## 2019-06-25 RX ADMIN — ENOXAPARIN SODIUM 40 MILLIGRAM(S): 100 INJECTION SUBCUTANEOUS at 21:49

## 2019-06-25 RX ADMIN — Medication 25 MILLIGRAM(S): at 06:40

## 2019-06-25 RX ADMIN — Medication 1 MILLIGRAM(S): at 13:03

## 2019-06-25 RX ADMIN — PANTOPRAZOLE SODIUM 40 MILLIGRAM(S): 20 TABLET, DELAYED RELEASE ORAL at 06:40

## 2019-06-25 RX ADMIN — DONEPEZIL HYDROCHLORIDE 10 MILLIGRAM(S): 10 TABLET, FILM COATED ORAL at 21:49

## 2019-06-25 RX ADMIN — Medication 25 MILLIGRAM(S): at 16:14

## 2019-06-25 RX ADMIN — SENNA PLUS 2 TABLET(S): 8.6 TABLET ORAL at 21:49

## 2019-06-25 RX ADMIN — MEMANTINE HYDROCHLORIDE 5 MILLIGRAM(S): 10 TABLET ORAL at 18:02

## 2019-06-25 RX ADMIN — Medication 25 MILLIGRAM(S): at 13:03

## 2019-06-25 RX ADMIN — CARBIDOPA AND LEVODOPA 2 TABLET(S): 25; 100 TABLET ORAL at 16:13

## 2019-06-25 RX ADMIN — Medication 100 MILLIGRAM(S): at 06:40

## 2019-06-25 RX ADMIN — Medication 100 MILLIGRAM(S): at 18:04

## 2019-06-25 RX ADMIN — MEMANTINE HYDROCHLORIDE 5 MILLIGRAM(S): 10 TABLET ORAL at 06:40

## 2019-06-25 RX ADMIN — Medication 0.5 MILLIGRAM(S): at 18:02

## 2019-06-25 RX ADMIN — MIDODRINE HYDROCHLORIDE 5 MILLIGRAM(S): 2.5 TABLET ORAL at 18:02

## 2019-06-25 RX ADMIN — Medication 25 MILLIGRAM(S): at 09:56

## 2019-06-25 RX ADMIN — CARBIDOPA AND LEVODOPA 2 TABLET(S): 25; 100 TABLET ORAL at 07:14

## 2019-06-25 RX ADMIN — Medication 1 MILLIGRAM(S): at 21:49

## 2019-06-25 RX ADMIN — Medication 0.5 MILLIGRAM(S): at 06:40

## 2019-06-25 RX ADMIN — CARBIDOPA AND LEVODOPA 2 TABLET(S): 25; 100 TABLET ORAL at 09:56

## 2019-06-25 RX ADMIN — CARBIDOPA AND LEVODOPA 2 TABLET(S): 25; 100 TABLET ORAL at 13:03

## 2019-06-25 RX ADMIN — Medication 25 MILLIGRAM(S): at 18:04

## 2019-06-25 RX ADMIN — NYSTATIN CREAM 1 APPLICATION(S): 100000 CREAM TOPICAL at 21:49

## 2019-06-25 RX ADMIN — QUETIAPINE FUMARATE 25 MILLIGRAM(S): 200 TABLET, FILM COATED ORAL at 21:49

## 2019-06-25 RX ADMIN — CARBIDOPA AND LEVODOPA 2 TABLET(S): 25; 100 TABLET ORAL at 18:04

## 2019-06-25 NOTE — CONSULT NOTE ADULT - REASON FOR ADMISSION
Parkinson's disease
Parkinson's Disease
falls
Parkinson's Disease Rehabilitation
Parkinson's disease
pt is s/p fall on 5/29/19.

## 2019-06-25 NOTE — PROGRESS NOTE ADULT - PROBLEM SELECTOR PLAN 2
Wellbutrin held. Zoloft held. Start Lexapro 10mg on 6/25. Discussed with Dr Ludin Gr. Ativan continues. Seroquel to 25mg at bedtime.   Psychiatry and neuropsychology are following  In order to control delusions/psychotic features on small dose Seroquel at bedtime. Still very anxious and depressed.

## 2019-06-25 NOTE — PROGRESS NOTE ADULT - ASSESSMENT
This is a 73 y/o female with PMHx advanced Parkinson's disease, orthostatic hypotension and anxiety, recent fall with +right wrist fx, brought in by her  to [facility] on 6/6/19 for ongoing body shaking at home and worsening unsteady gait, found to have e coli ESBL+ UTI and placed on meropenem, being admitted to the Parkinson's program here at [facility] for acute inpatient treatment of Parkinson's disease, gait and ADL dysfunction.

## 2019-06-25 NOTE — CONSULT NOTE ADULT - SUBJECTIVE AND OBJECTIVE BOX
Psychiatry Consultation-Liaison Follow-up Note  74y  Female   Encounter: At the bedside on BIU. Spouse present.  Chart reviewed,  	  Case Discussed with: Nursing staff/Treatment Team/Attending of Record.     Interval History: 75 y/o female with PMHx advanced Parkinson's disease, orthostatic hypotension, and anxiety. Pt appears much calmer, less anxious while sitting next to , still with confusion at times. Pt was originally seen by psychiatry for increased anxiety and perseverations about her  leaving her throughout the day. Pt with medications changed from Zoloft to Lexapro. Plan is to stop Wellbutrin to decrease tremor and started on Seroquel at bedtime. Pt seems to be tolerating new medications well, without worsening of Parkinson's sx.       Symptom progression/resolution: Pt appearing much calmer and less anxious when  is next to her. Pt still with confusion throughout conversation. Did not recognize that she has met psychiatry team before.  States she is not sleeping well because "her thoughts" are keeping her up at night. Pt remains perseverative about her spouse not being there for her, as per Dr. Coello this limits her performance in PT/OT and today particularly in speech therapy. Pt becomes more agitated when he is gone, of note hallucinatory phenomena seem better.     Medication Changes-  MEDICATIONS  (STANDING):  carbidopa 25 milliGRAM(s) Oral <User Schedule>  carbidopa/levodopa  25/100 2 Tablet(s) Oral <User Schedule>  carbidopa/levodopa CR 50/200 1 Tablet(s) Oral <User Schedule>  docusate sodium 100 milliGRAM(s) Oral two times a day  donepezil 10 milliGRAM(s) Oral at bedtime  enoxaparin Injectable 40 milliGRAM(s) SubCutaneous at bedtime  escitalopram 10 milliGRAM(s) Oral daily  folic acid 1 milliGRAM(s) Oral daily  LORazepam     Tablet 1 milliGRAM(s) Oral at bedtime  LORazepam     Tablet 0.5 milliGRAM(s) Oral two times a day  memantine 5 milliGRAM(s) Oral two times a day  midodrine 10 milliGRAM(s) Oral <User Schedule>  midodrine 5 milliGRAM(s) Oral <User Schedule>  pantoprazole    Tablet 40 milliGRAM(s) Oral before breakfast  QUEtiapine 25 milliGRAM(s) Oral at bedtime  senna 2 Tablet(s) Oral at bedtime    MEDICATIONS  (PRN):  acetaminophen   Tablet .. 650 milliGRAM(s) Oral every 6 hours PRN Mild Pain (1 - 3)  ondansetron    Tablet 4 milliGRAM(s) Oral every 6 hours PRN Nausea and/or Vomiting  polyethylene glycol 3350 17 Gram(s) Oral daily PRN Constipation    Mental Status Examination:  General Appearance: Pt is a blonde female, appears younger than stated age, poor eye contact.    Remarkable Features: sitting in wheelchair with brace on right wrist.   Psychomotor State: Calm   Abnormal movements and posture: Deficits as per physiatry and neurology notes.   Social interaction and affect: very polite and cheerful.   Cognition, perceptual abnormalities: Seems less internally preoccupied, but spouse was at beside with her.   Consciousness: Alert  Orientation: to person and place  Memory: Recent/Past/Remote: can give accurate autobiographical information, short term and recent memory appear impaired.   Attention: Impaired.   Naming/Repitition/Comprehension: Grossly Intact  Insight/Judgment: Limited, adequate for basic needs     Laboratory testing-                        13.3   7.13  )-----------( 203      ( 24 Jun 2019 05:10 )             41.1     06-24    141  |  106  |  21  ----------------------------<  86  4.1   |  27  |  0.96    Ca    9.4      24 Jun 2019 05:10    TPro  6.3  /  Alb  3.3  /  TBili  0.3  /  DBili  x   /  AST  14  /  ALT  13  /  AlkPhos  97  06-24    LIVER FUNCTIONS - ( 24 Jun 2019 05:10 )  Alb: 3.3 g/dL / Pro: 6.3 g/dL / ALK PHOS: 97 U/L / ALT: 13 U/L DA / AST: 14 U/L / GGT: x           < from: 12 Lead ECG (06.06.19 @ 11:01) >  Ventricular Rate 76 BPM    Atrial Rate 76 BPM    P-R Interval 148 ms    QRS Duration 82 ms    Q-T Interval 406 ms    QTC Calculation(Bezet) 456 ms    P Axis 53 degrees    R Axis -28 degrees    T Axis -10 degrees    Diagnosis Line Normal sinus rhythm  Minimal voltage criteria for LVH, may be normal variant  Anteroseptal infarct , age undetermined  Abnormal ECG    < end of copied text >    Vital Signs Last 24 Hrs  T(C): 36.7 (25 Jun 2019 08:22), Max: 36.8 (24 Jun 2019 20:24)  T(F): 98.1 (25 Jun 2019 08:22), Max: 98.2 (24 Jun 2019 20:24)  HR: 66 (25 Jun 2019 08:22) (66 - 81)  BP: 108/65 (25 Jun 2019 08:22) (108/65 - 125/80)  RR: 14 (25 Jun 2019 08:22) (14 - 14)  SpO2: 96% (25 Jun 2019 08:22) (95% - 96%)    Psychiatric Diagnosis: MDD recurrent per collateral information, Parkinson's Disease with dementia. Anxiety Disorder NOS.     Recommendations: Continue current medication regimen.     Level of supervision: Routine     Hospital Course Follow-up: Will follow with you.

## 2019-06-25 NOTE — PROGRESS NOTE ADULT - SUBJECTIVE AND OBJECTIVE BOX
CHIEF COMPLAINT: 'I'm upset'.       HISTORY OF PRESENT ILLNESS  This is a 73 y/o female with PMHx advanced Parkinson's disease, orthostatic hypotension and anxiety brought in by her  to [facility] on 6/6/19 for ongoing body shaking at home and worsening unsteady gait. Patient fell 8 days ago and sustained a right wrist fracture that was splinted in the ED. On 6/5/19, per outpatient Neuro recs, Midodrine was increased to 10mg in AM along with continued 5mg in evening for severe orthostasis.  notes patient experiencing body shaking with position changes, is unaware of this and becomes extremely weak afterwards. No prior history of seizures. No head trauma, tongue biting or bowel incontinence. Patient is intermittently incontinent of urine and was treated for ESBL E. coli UTI in April. SInce then has not had recurrent UTI's.     In the ED on the 6th, notable labs included normal CBC, CMP and UA with concerns for pyuria. CT head with brain atrophy, CXR clear. Patient admitted.     During hospitalization at , neurology consulted. EEG done and did not show any epileptiform activity.  Recommended to continue current regimen of Parkinson's medications - Sinemet 25/100 2 tabs 5 times per day, carbidopa 25 mg five times per day, Sinemet CR 50/200 qhs and to continue Midodrine with consideration to increase to 10 mg BID if needed.  Urine culture sent and shows >100, 000 e coli +ESBL. ID consulted and started patient on meropenem 500mg [id number]. Patient to continue meropenem until end of day on 6/13.   Patient medically stable and accepted into the Parkinson's program here at [facility] under Dr Coello's supervision on 6/10/19. (10 Carlos 2019 15:21)      PAST MEDICAL & SURGICAL HISTORY:  Parkinson disease  Anxiety  Detached Retina, Left: and macular hole 5/2011  S/P Cataract Surgery: 2011  Macular Hole: 2010  Retinal Tear: 2003       REVIEW OF SYMPTOMS  [X] Constitutional WNL     [X] Cardio WNL            [X] Resp WNL           [X] GI WNL                          [X]  WNL                   [X] Heme WNL              [X] Endo WNL                     [X] Skin WNL                      VITALS  Vital Signs Last 24 Hrs  T(C): 36.7 (25 Jun 2019 08:22), Max: 36.8 (24 Jun 2019 20:24)  T(F): 98.1 (25 Jun 2019 08:22), Max: 98.2 (24 Jun 2019 20:24)  HR: 66 (25 Jun 2019 08:22) (66 - 86)  BP: 108/65 (25 Jun 2019 08:22) (108/65 - 135/84)  BP(mean): --  RR: 14 (25 Jun 2019 08:22) (14 - 14)  SpO2: 96% (25 Jun 2019 08:22) (95% - 96%)       PHYSICAL EXAM  Constitutional - Emotional, anxious  HEENT - NCAT, EOMI  Neck - Supple, No limited ROM  Chest - CTA bilaterally, No wheeze, No rhonchi, No crackles  Cardiovascular - RRR, S1S2, No murmurs  Abdomen - BS+, Soft, NTND  Extremities - No C/C/E, No calf tenderness   Skin-no rash  Wounds-na      Neurologic Exam -  no new deficits                  Balance - impaired     Psychiatric - Anxious, depressed       FUNCTIONAL PROGRESS  Gait - max A  ADLs - max A  Transfers - max A  Functional transfer - max A    RECENT LABS                        13.3   7.13  )-----------( 203      ( 24 Jun 2019 05:10 )             41.1     06-24    141  |  106  |  21  ----------------------------<  86  4.1   |  27  |  0.96    Ca    9.4      24 Jun 2019 05:10    TPro  6.3  /  Alb  3.3  /  TBili  0.3  /  DBili  x   /  AST  14  /  ALT  13  /  AlkPhos  97  06-24      LIVER FUNCTIONS - ( 24 Jun 2019 05:10 )  Alb: 3.3 g/dL / Pro: 6.3 g/dL / ALK PHOS: 97 U/L / ALT: 13 U/L DA / AST: 14 U/L / GGT: x                           RADIOLOGY/OTHER RESULTS      CURRENT MEDICATIONS  MEDICATIONS  (STANDING):  carbidopa 25 milliGRAM(s) Oral <User Schedule>  carbidopa/levodopa  25/100 2 Tablet(s) Oral <User Schedule>  carbidopa/levodopa CR 50/200 1 Tablet(s) Oral <User Schedule>  docusate sodium 100 milliGRAM(s) Oral two times a day  donepezil 10 milliGRAM(s) Oral at bedtime  enoxaparin Injectable 40 milliGRAM(s) SubCutaneous at bedtime  escitalopram 10 milliGRAM(s) Oral daily  folic acid 1 milliGRAM(s) Oral daily  LORazepam     Tablet 1 milliGRAM(s) Oral at bedtime  LORazepam     Tablet 0.5 milliGRAM(s) Oral two times a day  memantine 5 milliGRAM(s) Oral two times a day  midodrine 10 milliGRAM(s) Oral <User Schedule>  midodrine 5 milliGRAM(s) Oral <User Schedule>  pantoprazole    Tablet 40 milliGRAM(s) Oral before breakfast  QUEtiapine 25 milliGRAM(s) Oral at bedtime  senna 2 Tablet(s) Oral at bedtime    MEDICATIONS  (PRN):  acetaminophen   Tablet .. 650 milliGRAM(s) Oral every 6 hours PRN Mild Pain (1 - 3)  ondansetron    Tablet 4 milliGRAM(s) Oral every 6 hours PRN Nausea and/or Vomiting  polyethylene glycol 3350 17 Gram(s) Oral daily PRN Constipation      ASSESSMENT & PLAN    GI/Bowel Management - Colace   Management - Toilet Q2  Skin - Turn Q2  Pain - Tylenol PRN  DVT PPX - Lovenox  Diet - reg c thins    Continue comprehensive acute rehab program consisting of 3hrs/day of OT/PT and SLP.

## 2019-06-25 NOTE — CHART NOTE - NSCHARTNOTEFT_GEN_A_CORE
Nutrition Follow Up Note  Hospital Course   (Per Electronic Medical Record):     Source:   Medical Record [X]      Diet:   Low Sodium Diet w/ Thin Liquids  Tolerates Diet Well  Consumes 75% of Meals (as Per Documentation)   on Ensure Enlive 8oz PO Daily (Provides 350kcal & 20grams of Protein)     Enteral/Parenteral Nutrition: N/A    Current Weight: 155.2lb on 6/25  Obtain Weights Weekly  Obtain Weights Weekly   Weight Most Likely an Error on 6/13    Pertinent Medications: MEDICATIONS  (STANDING):  carbidopa 25 milliGRAM(s) Oral <User Schedule>  carbidopa/levodopa  25/100 2 Tablet(s) Oral <User Schedule>  carbidopa/levodopa CR 50/200 1 Tablet(s) Oral <User Schedule>  docusate sodium 100 milliGRAM(s) Oral two times a day  donepezil 10 milliGRAM(s) Oral at bedtime  enoxaparin Injectable 40 milliGRAM(s) SubCutaneous at bedtime  folic acid 1 milliGRAM(s) Oral daily  LORazepam     Tablet 1 milliGRAM(s) Oral at bedtime  LORazepam     Tablet 0.5 milliGRAM(s) Oral two times a day  memantine 5 milliGRAM(s) Oral two times a day  midodrine 10 milliGRAM(s) Oral <User Schedule>  midodrine 5 milliGRAM(s) Oral <User Schedule>  pantoprazole    Tablet 40 milliGRAM(s) Oral before breakfast  QUEtiapine 12.5 milliGRAM(s) Oral at bedtime  senna 2 Tablet(s) Oral at bedtime    MEDICATIONS  (PRN):  acetaminophen   Tablet .. 650 milliGRAM(s) Oral every 6 hours PRN Mild Pain (1 - 3)  ondansetron    Tablet 4 milliGRAM(s) Oral every 6 hours PRN Nausea and/or Vomiting  polyethylene glycol 3350 17 Gram(s) Oral daily PRN Constipation    Pertinent Labs:  06-24 Na141 mmol/L Glu 86 mg/dL K+ 4.1 mmol/L Cr  0.96 mg/dL BUN 21 mg/dL 06-24 Alb 3.3 g/dL    Skin: No Pressure Ulcers     Edema: None Noted     Last BM: on 6/22    Estimated Needs:   [X] No Change Since Previous Assessment    Previous Nutrition Diagnosis:   No Active Nutrition Dx @ This Present Time  Mild Malnutrition     Nutrition Diagnosis is [X] Ongoing - Continues on Nutrition Supplement     New Nutrition Diagnosis: [X] Not Applicable    Interventions:   1. Recommend Continue Nutrition Plan of Care     Monitoring & Evaluation:   [X] Weights   [X] PO Intake   [X] Follow Up (Per Protocol)  [X] Tolerance to Diet Prescription   [X] Other: Labs     Registered Dietitian/Nutritionist Remains Available.  Tonio Gale RDN    Pager # [phone]  Phone# [phone]

## 2019-06-25 NOTE — CONSULT NOTE ADULT - CONSULT REQUESTED DATE/TIME
11-Jun-2019
13-Jun-2019 12:35
18-Jun-2019 14:15
23-Jun-2019 13:53
11-Jun-2019 07:37
25-Jun-2019 16:13

## 2019-06-26 ENCOUNTER — TRANSCRIPTION ENCOUNTER (OUTPATIENT)
Age: 74
End: 2019-06-26

## 2019-06-26 LAB
ALBUMIN SERPL ELPH-MCNC: 3.3 G/DL — SIGNIFICANT CHANGE UP (ref 3.3–5)
ALP SERPL-CCNC: 96 U/L — SIGNIFICANT CHANGE UP (ref 40–120)
ALT FLD-CCNC: 13 U/L DA — SIGNIFICANT CHANGE UP (ref 10–45)
ANION GAP SERPL CALC-SCNC: 8 MMOL/L — SIGNIFICANT CHANGE UP (ref 5–17)
AST SERPL-CCNC: 13 U/L — SIGNIFICANT CHANGE UP (ref 10–40)
BILIRUB SERPL-MCNC: 0.3 MG/DL — SIGNIFICANT CHANGE UP (ref 0.2–1.2)
BUN SERPL-MCNC: 19 MG/DL — SIGNIFICANT CHANGE UP (ref 7–23)
CALCIUM SERPL-MCNC: 9.2 MG/DL — SIGNIFICANT CHANGE UP (ref 8.4–10.5)
CHLORIDE SERPL-SCNC: 105 MMOL/L — SIGNIFICANT CHANGE UP (ref 96–108)
CO2 SERPL-SCNC: 27 MMOL/L — SIGNIFICANT CHANGE UP (ref 22–31)
CREAT SERPL-MCNC: 0.93 MG/DL — SIGNIFICANT CHANGE UP (ref 0.5–1.3)
GLUCOSE SERPL-MCNC: 92 MG/DL — SIGNIFICANT CHANGE UP (ref 70–99)
HCT VFR BLD CALC: 40.1 % — SIGNIFICANT CHANGE UP (ref 34.5–45)
HGB BLD-MCNC: 12.8 G/DL — SIGNIFICANT CHANGE UP (ref 11.5–15.5)
MCHC RBC-ENTMCNC: 30.1 PG — SIGNIFICANT CHANGE UP (ref 27–34)
MCHC RBC-ENTMCNC: 31.9 GM/DL — LOW (ref 32–36)
MCV RBC AUTO: 94.4 FL — SIGNIFICANT CHANGE UP (ref 80–100)
NRBC # BLD: 0 /100 WBCS — SIGNIFICANT CHANGE UP (ref 0–0)
PLATELET # BLD AUTO: 199 K/UL — SIGNIFICANT CHANGE UP (ref 150–400)
POTASSIUM SERPL-MCNC: 3.9 MMOL/L — SIGNIFICANT CHANGE UP (ref 3.5–5.3)
POTASSIUM SERPL-SCNC: 3.9 MMOL/L — SIGNIFICANT CHANGE UP (ref 3.5–5.3)
PROT SERPL-MCNC: 6.4 G/DL — SIGNIFICANT CHANGE UP (ref 6–8.3)
RBC # BLD: 4.25 M/UL — SIGNIFICANT CHANGE UP (ref 3.8–5.2)
RBC # FLD: 13.4 % — SIGNIFICANT CHANGE UP (ref 10.3–14.5)
SODIUM SERPL-SCNC: 140 MMOL/L — SIGNIFICANT CHANGE UP (ref 135–145)
WBC # BLD: 6.54 K/UL — SIGNIFICANT CHANGE UP (ref 3.8–10.5)
WBC # FLD AUTO: 6.54 K/UL — SIGNIFICANT CHANGE UP (ref 3.8–10.5)

## 2019-06-26 PROCEDURE — 99232 SBSQ HOSP IP/OBS MODERATE 35: CPT

## 2019-06-26 RX ORDER — MEMANTINE HYDROCHLORIDE 10 MG/1
1 TABLET ORAL
Qty: 0 | Refills: 0 | DISCHARGE

## 2019-06-26 RX ORDER — FOLIC ACID 0.8 MG
1 TABLET ORAL
Qty: 0 | Refills: 0 | DISCHARGE

## 2019-06-26 RX ORDER — ESCITALOPRAM OXALATE 10 MG/1
1 TABLET, FILM COATED ORAL
Qty: 0 | Refills: 0 | DISCHARGE
Start: 2019-06-26

## 2019-06-26 RX ORDER — MEMANTINE HYDROCHLORIDE 10 MG/1
1 TABLET ORAL
Qty: 0 | Refills: 0 | DISCHARGE
Start: 2019-06-26

## 2019-06-26 RX ORDER — ESCITALOPRAM OXALATE 10 MG/1
1 TABLET, FILM COATED ORAL
Qty: 30 | Refills: 0
Start: 2019-06-26 | End: 2019-07-25

## 2019-06-26 RX ORDER — MIDODRINE HYDROCHLORIDE 2.5 MG/1
1 TABLET ORAL
Qty: 0 | Refills: 0 | DISCHARGE
Start: 2019-06-26

## 2019-06-26 RX ORDER — SERTRALINE 25 MG/1
2 TABLET, FILM COATED ORAL
Qty: 0 | Refills: 0 | DISCHARGE

## 2019-06-26 RX ORDER — NYSTATIN CREAM 100000 [USP'U]/G
1 CREAM TOPICAL
Qty: 0 | Refills: 0 | DISCHARGE
Start: 2019-06-26

## 2019-06-26 RX ORDER — ACETAMINOPHEN 500 MG
2 TABLET ORAL
Qty: 0 | Refills: 0 | DISCHARGE
Start: 2019-06-26

## 2019-06-26 RX ORDER — BUPROPION HYDROCHLORIDE 150 MG/1
1 TABLET, EXTENDED RELEASE ORAL
Qty: 0 | Refills: 0 | DISCHARGE

## 2019-06-26 RX ORDER — QUETIAPINE FUMARATE 200 MG/1
1 TABLET, FILM COATED ORAL
Qty: 0 | Refills: 0 | DISCHARGE
Start: 2019-06-26

## 2019-06-26 RX ORDER — SENNA PLUS 8.6 MG/1
2 TABLET ORAL
Qty: 0 | Refills: 0 | DISCHARGE
Start: 2019-06-26

## 2019-06-26 RX ORDER — CARBIDOPA AND LEVODOPA 25; 100 MG/1; MG/1
1 TABLET ORAL
Qty: 0 | Refills: 0 | DISCHARGE

## 2019-06-26 RX ORDER — PANTOPRAZOLE SODIUM 20 MG/1
1 TABLET, DELAYED RELEASE ORAL
Qty: 0 | Refills: 0 | DISCHARGE
Start: 2019-06-26

## 2019-06-26 RX ORDER — NYSTATIN CREAM 100000 [USP'U]/G
1 CREAM TOPICAL
Qty: 1 | Refills: 0
Start: 2019-06-26 | End: 2019-07-25

## 2019-06-26 RX ORDER — CARBIDOPA AND LEVODOPA 25; 100 MG/1; MG/1
1 TABLET ORAL
Qty: 0 | Refills: 0 | DISCHARGE
Start: 2019-06-26

## 2019-06-26 RX ORDER — QUETIAPINE FUMARATE 200 MG/1
1 TABLET, FILM COATED ORAL
Qty: 30 | Refills: 0
Start: 2019-06-26 | End: 2019-07-25

## 2019-06-26 RX ORDER — FOLIC ACID 0.8 MG
1 TABLET ORAL
Qty: 0 | Refills: 0 | DISCHARGE
Start: 2019-06-26

## 2019-06-26 RX ADMIN — Medication 100 MILLIGRAM(S): at 17:48

## 2019-06-26 RX ADMIN — Medication 1 MILLIGRAM(S): at 21:02

## 2019-06-26 RX ADMIN — CARBIDOPA AND LEVODOPA 2 TABLET(S): 25; 100 TABLET ORAL at 16:07

## 2019-06-26 RX ADMIN — MEMANTINE HYDROCHLORIDE 5 MILLIGRAM(S): 10 TABLET ORAL at 17:48

## 2019-06-26 RX ADMIN — SENNA PLUS 2 TABLET(S): 8.6 TABLET ORAL at 21:03

## 2019-06-26 RX ADMIN — CARBIDOPA AND LEVODOPA 2 TABLET(S): 25; 100 TABLET ORAL at 09:59

## 2019-06-26 RX ADMIN — Medication 25 MILLIGRAM(S): at 13:26

## 2019-06-26 RX ADMIN — CARBIDOPA AND LEVODOPA 2 TABLET(S): 25; 100 TABLET ORAL at 06:31

## 2019-06-26 RX ADMIN — MEMANTINE HYDROCHLORIDE 5 MILLIGRAM(S): 10 TABLET ORAL at 06:31

## 2019-06-26 RX ADMIN — QUETIAPINE FUMARATE 25 MILLIGRAM(S): 200 TABLET, FILM COATED ORAL at 21:03

## 2019-06-26 RX ADMIN — Medication 1 MILLIGRAM(S): at 11:46

## 2019-06-26 RX ADMIN — NYSTATIN CREAM 1 APPLICATION(S): 100000 CREAM TOPICAL at 17:48

## 2019-06-26 RX ADMIN — PANTOPRAZOLE SODIUM 40 MILLIGRAM(S): 20 TABLET, DELAYED RELEASE ORAL at 06:31

## 2019-06-26 RX ADMIN — Medication 0.5 MILLIGRAM(S): at 06:31

## 2019-06-26 RX ADMIN — Medication 0.5 MILLIGRAM(S): at 17:48

## 2019-06-26 RX ADMIN — Medication 100 MILLIGRAM(S): at 06:31

## 2019-06-26 RX ADMIN — Medication 25 MILLIGRAM(S): at 09:59

## 2019-06-26 RX ADMIN — NYSTATIN CREAM 1 APPLICATION(S): 100000 CREAM TOPICAL at 06:31

## 2019-06-26 RX ADMIN — ESCITALOPRAM OXALATE 10 MILLIGRAM(S): 10 TABLET, FILM COATED ORAL at 11:46

## 2019-06-26 RX ADMIN — CARBIDOPA AND LEVODOPA 2 TABLET(S): 25; 100 TABLET ORAL at 18:54

## 2019-06-26 RX ADMIN — ENOXAPARIN SODIUM 40 MILLIGRAM(S): 100 INJECTION SUBCUTANEOUS at 21:03

## 2019-06-26 RX ADMIN — Medication 25 MILLIGRAM(S): at 18:54

## 2019-06-26 RX ADMIN — CARBIDOPA AND LEVODOPA 2 TABLET(S): 25; 100 TABLET ORAL at 13:26

## 2019-06-26 RX ADMIN — Medication 25 MILLIGRAM(S): at 16:07

## 2019-06-26 RX ADMIN — DONEPEZIL HYDROCHLORIDE 10 MILLIGRAM(S): 10 TABLET, FILM COATED ORAL at 21:03

## 2019-06-26 RX ADMIN — CARBIDOPA AND LEVODOPA 1 TABLET(S): 25; 100 TABLET ORAL at 20:01

## 2019-06-26 RX ADMIN — Medication 25 MILLIGRAM(S): at 06:31

## 2019-06-26 NOTE — DISCHARGE NOTE PROVIDER - NSDCCPCAREPLAN_GEN_ALL_CORE_FT
PRINCIPAL DISCHARGE DIAGNOSIS  Diagnosis: Parkinson disease  Assessment and Plan of Treatment: Parkinson disease      SECONDARY DISCHARGE DIAGNOSES  Diagnosis: Colonized with infectious organism  Assessment and Plan of Treatment: Colonized with infectious organism    Diagnosis: Wrist fracture  Assessment and Plan of Treatment: Wrist fracture    Diagnosis: REM sleep behavior disorder  Assessment and Plan of Treatment: REM sleep behavior disorder    Diagnosis: Orthostatic hypotension  Assessment and Plan of Treatment: Orthostatic hypotension

## 2019-06-26 NOTE — DISCHARGE NOTE PROVIDER - NSDCACTIVITY_GEN_ALL_CORE
Do not drive or operate machinery/Sex allowed/Showering allowed/Do not make important decisions/Walking - Indoors allowed/No heavy lifting/straining/Stairs allowed/Walking - Outdoors allowed

## 2019-06-26 NOTE — PROGRESS NOTE ADULT - ASSESSMENT
This is a 73 y/o female with PMHx advanced Parkinson's disease, orthostatic hypotension and anxiety, recent fall with +right wrist fx, brought in by her  to [facility] on 6/6/19 for ongoing body shaking at home and worsening unsteady gait, found to have e coli ESBL+ UTI and placed on meropenem, being admitted to the Parkinson's program here at [facility] for acute inpatient treatment of Parkinson's disease, gait and ADL dysfunction.

## 2019-06-26 NOTE — PROGRESS NOTE ADULT - PROBLEM SELECTOR PLAN 2
Wellbutrin held. Zoloft held. Start Lexapro 10mg on 6/25. Discussed with Dr Ludin Gr. Ativan continues. Seroquel to 25mg at bedtime-slept well. Mood improved this am. Psychiatry and neuropsychology are following

## 2019-06-26 NOTE — DISCHARGE NOTE PROVIDER - NSDCHHNEEDSERVICE_GEN_ALL_CORE
Teaching and training/Observation and assessment/Medication teaching and assessment/Rehabilitation services

## 2019-06-26 NOTE — DISCHARGE NOTE PROVIDER - HOSPITAL COURSE
75 y/o female with PMHx advanced Parkinson's disease, orthostatic hypotension and anxiety brought in by her  to [facility] on 6/6/19 for ongoing body shaking at home and worsening unsteady gait. Patient fell 8 days ago and sustained a right wrist fracture that was splinted in the ED. On 6/5/19, per outpatient Neuro recs, Midodrine was increased to 10mg in AM along with continued 5mg in evening for severe orthostasis.  notes patient experiencing body shaking with position changes, is unaware of this and becomes extremely weak afterwards. No prior history of seizures. No head trauma, tongue biting or bowel incontinence. Patient is intermittently incontinent of urine and was treated for ESBL E. coli UTI in April. SInce then has not had recurrent UTI's.     In the ED on the 6th, notable labs included normal CBC, CMP and UA with concerns for pyuria. CT head with brain atrophy, CXR clear. Patient admitted.     During hospitalization at , neurology consulted. EEG done and did not show any epileptiform activity.    Recommended to continue current regimen of Parkinson's medications - Sinemet 25/100 2 tabs 5 times per day, carbidopa 25 mg five times per day, Sinemet CR 50/200 qhs and to continue Midodrine with consideration to increase to 10 mg BID if needed.    Urine culture sent and shows >100, 000 e coli +ESBL. ID consulted and started patient on meropenem 500mg [id number]. Stabilized for d/c to rehab.    At [facility] rehab patient evaluated by psychiatry and medications adjusted. Antibiotic course completed, patient seen by ID. RUE XR repeated and ortho evaluation completed. 6 weeks non weight bearing status requested.  Family training completed on 6/26 and patient cleared for d/c home on 75 y/o female with PMHx advanced Parkinson's disease, orthostatic hypotension and anxiety brought in by her  to [facility] on 6/6/19 for ongoing body shaking at home and worsening unsteady gait. Patient fell 8 days ago and sustained a right wrist fracture that was splinted in the ED. On 6/5/19, per outpatient Neuro recs, Midodrine was increased to 10mg in AM along with continued 5mg in evening for severe orthostasis.  notes patient experiencing body shaking with position changes, is unaware of this and becomes extremely weak afterwards. No prior history of seizures. No head trauma, tongue biting or bowel incontinence. Patient is intermittently incontinent of urine and was treated for ESBL E. coli UTI in April. SInce then has not had recurrent UTI's.     In the ED on the 6th, notable labs included normal CBC, CMP and UA with concerns for pyuria. CT head with brain atrophy, CXR clear. Patient admitted.     During hospitalization at , neurology consulted. EEG done and did not show any epileptiform activity.    Recommended to continue current regimen of Parkinson's medications - Sinemet 25/100 2 tabs 5 times per day, carbidopa 25 mg five times per day, Sinemet CR 50/200 qhs and to continue Midodrine with consideration to increase to 10 mg BID if needed.    Urine culture sent and shows >100, 000 e coli +ESBL. ID consulted and started patient on meropenem 500mg [id number]. Stabilized for d/c to rehab.    At [facility] rehab patient evaluated by psychiatry and medications adjusted. Antibiotic course completed, patient seen by ID. RUE XR repeated and ortho evaluation completed. 6 weeks non weight bearing status requested.  Family training completed on 6/26 and patient cleared for d/c home on 6/27/19.

## 2019-06-26 NOTE — PROGRESS NOTE ADULT - SUBJECTIVE AND OBJECTIVE BOX
CHIEF COMPLAINT: NAD, 'I think I slept well last night'.       HISTORY OF PRESENT ILLNESS  This is a 73 y/o female with PMHx advanced Parkinson's disease, orthostatic hypotension and anxiety brought in by her  to [facility] on 6/6/19 for ongoing body shaking at home and worsening unsteady gait. Patient fell 8 days ago and sustained a right wrist fracture that was splinted in the ED. On 6/5/19, per outpatient Neuro recs, Midodrine was increased to 10mg in AM along with continued 5mg in evening for severe orthostasis.  notes patient experiencing body shaking with position changes, is unaware of this and becomes extremely weak afterwards. No prior history of seizures. No head trauma, tongue biting or bowel incontinence. Patient is intermittently incontinent of urine and was treated for ESBL E. coli UTI in April. SInce then has not had recurrent UTI's.     In the ED on the 6th, notable labs included normal CBC, CMP and UA with concerns for pyuria. CT head with brain atrophy, CXR clear. Patient admitted.     During hospitalization at , neurology consulted. EEG done and did not show any epileptiform activity.  Recommended to continue current regimen of Parkinson's medications - Sinemet 25/100 2 tabs 5 times per day, carbidopa 25 mg five times per day, Sinemet CR 50/200 qhs and to continue Midodrine with consideration to increase to 10 mg BID if needed.  Urine culture sent and shows >100, 000 e coli +ESBL. ID consulted and started patient on meropenem 500mg [id number]. Patient to continue meropenem until end of day on 6/13.   Patient medically stable and accepted into the Parkinson's program here at [facility] under Dr Coello's supervision on 6/10/19. (10 Carlos 2019 15:21)      PAST MEDICAL & SURGICAL HISTORY:  Parkinson disease  Anxiety  Detached Retina, Left: and macular hole 5/2011  S/P Cataract Surgery: 2011  Macular Hole: 2010  Retinal Tear: 2003       REVIEW OF SYMPTOMS  [X] Constitutional WNL     [X] Cardio WNL            [X] Resp WNL           [X] GI WNL                          [X]  WNL                   [X] Heme WNL              [X] Endo WNL                 VITALS  Vital Signs Last 24 Hrs  T(C): 36.6 (26 Jun 2019 08:19), Max: 36.8 (25 Jun 2019 20:35)  T(F): 97.9 (26 Jun 2019 08:19), Max: 98.3 (25 Jun 2019 20:35)  HR: 75 (26 Jun 2019 08:19) (75 - 80)  BP: 163/84 (26 Jun 2019 08:19) (114/73 - 163/84)  BP(mean): --  RR: 14 (26 Jun 2019 08:19) (14 - 14)  SpO2: 96% (26 Jun 2019 08:19) (95% - 96%)      PHYSICAL EXAM  Constitutional - NAD, in chair, smiling, comfortable  HEENT - NCAT, EOMI  Neck - Supple, No limited ROM  Chest - CTA bilaterally, No wheeze, No rhonchi, No crackles  Cardiovascular - RRR, S1S2, No murmurs  Abdomen - BS+, Soft, NTND  Extremities - right wrist limited ROM  Skin-moisture rash under breast  Wounds-na      Neurologic Exam - no new deficits.                    Balance - impaired     Psychiatric - Mood improved, reports good sleep, Emotional, Affect WNL         FUNCTIONAL PROGRESS  Gait -max A   ADLs - max A  Transfers - max A  Functional transfer - max A    RECENT LABS                        12.8   6.54  )-----------( 199      ( 26 Jun 2019 05:30 )             40.1     06-26    140  |  105  |  19  ----------------------------<  92  3.9   |  27  |  0.93    Ca    9.2      26 Jun 2019 05:30    TPro  6.4  /  Alb  3.3  /  TBili  0.3  /  DBili  x   /  AST  13  /  ALT  13  /  AlkPhos  96  06-26      LIVER FUNCTIONS - ( 26 Jun 2019 05:30 )  Alb: 3.3 g/dL / Pro: 6.4 g/dL / ALK PHOS: 96 U/L / ALT: 13 U/L DA / AST: 13 U/L / GGT: x                           RADIOLOGY/OTHER RESULTS      CURRENT MEDICATIONS  MEDICATIONS  (STANDING):  carbidopa 25 milliGRAM(s) Oral <User Schedule>  carbidopa/levodopa  25/100 2 Tablet(s) Oral <User Schedule>  carbidopa/levodopa CR 50/200 1 Tablet(s) Oral <User Schedule>  docusate sodium 100 milliGRAM(s) Oral two times a day  donepezil 10 milliGRAM(s) Oral at bedtime  enoxaparin Injectable 40 milliGRAM(s) SubCutaneous at bedtime  escitalopram 10 milliGRAM(s) Oral daily  folic acid 1 milliGRAM(s) Oral daily  LORazepam     Tablet 1 milliGRAM(s) Oral at bedtime  LORazepam     Tablet 0.5 milliGRAM(s) Oral two times a day  memantine 5 milliGRAM(s) Oral two times a day  midodrine 10 milliGRAM(s) Oral <User Schedule>  midodrine 5 milliGRAM(s) Oral <User Schedule>  nystatin Powder 1 Application(s) Topical two times a day  pantoprazole    Tablet 40 milliGRAM(s) Oral before breakfast  QUEtiapine 25 milliGRAM(s) Oral at bedtime  senna 2 Tablet(s) Oral at bedtime    MEDICATIONS  (PRN):  acetaminophen   Tablet .. 650 milliGRAM(s) Oral every 6 hours PRN Mild Pain (1 - 3)  ondansetron    Tablet 4 milliGRAM(s) Oral every 6 hours PRN Nausea and/or Vomiting  polyethylene glycol 3350 17 Gram(s) Oral daily PRN Constipation      ASSESSMENT & PLAN      GI/Bowel Management - Colace   Management - Toilet Q2  Skin - Turn Q2  Pain - Tylenol PRN  DVT PPX - Lovenox  Diet - reg c thins    Continue comprehensive acute rehab program consisting of 3hrs/day of OT/PT and SLP.

## 2019-06-26 NOTE — DISCHARGE NOTE PROVIDER - CARE PROVIDER_API CALL
Marina Kumar (MD)  Clinical Neurophysiology; EEGEpilepsy; Neurology; Sleep Medicine  [address], [address]  [place] [address]  Phone: [phone]  Fax: [phone]  Follow Up Time:     Virgil Obrien (DO)  Internal Medicine  [address] [place] [address]  Phone: [phone]  Fax: [phone]  Follow Up Time:

## 2019-06-27 ENCOUNTER — TRANSCRIPTION ENCOUNTER (OUTPATIENT)
Age: 74
End: 2019-06-27

## 2019-06-27 VITALS
DIASTOLIC BLOOD PRESSURE: 90 MMHG | SYSTOLIC BLOOD PRESSURE: 144 MMHG | OXYGEN SATURATION: 94 % | RESPIRATION RATE: 14 BRPM | TEMPERATURE: 98 F | HEART RATE: 67 BPM

## 2019-06-27 PROCEDURE — 99233 SBSQ HOSP IP/OBS HIGH 50: CPT

## 2019-06-27 PROCEDURE — 99239 HOSP IP/OBS DSCHRG MGMT >30: CPT

## 2019-06-27 RX ADMIN — Medication 100 MILLIGRAM(S): at 05:19

## 2019-06-27 RX ADMIN — ESCITALOPRAM OXALATE 10 MILLIGRAM(S): 10 TABLET, FILM COATED ORAL at 11:36

## 2019-06-27 RX ADMIN — NYSTATIN CREAM 1 APPLICATION(S): 100000 CREAM TOPICAL at 05:19

## 2019-06-27 RX ADMIN — Medication 0.5 MILLIGRAM(S): at 05:19

## 2019-06-27 RX ADMIN — CARBIDOPA AND LEVODOPA 2 TABLET(S): 25; 100 TABLET ORAL at 07:00

## 2019-06-27 RX ADMIN — Medication 25 MILLIGRAM(S): at 07:00

## 2019-06-27 RX ADMIN — PANTOPRAZOLE SODIUM 40 MILLIGRAM(S): 20 TABLET, DELAYED RELEASE ORAL at 07:00

## 2019-06-27 RX ADMIN — CARBIDOPA AND LEVODOPA 2 TABLET(S): 25; 100 TABLET ORAL at 10:01

## 2019-06-27 RX ADMIN — Medication 25 MILLIGRAM(S): at 10:01

## 2019-06-27 RX ADMIN — MEMANTINE HYDROCHLORIDE 5 MILLIGRAM(S): 10 TABLET ORAL at 05:19

## 2019-06-27 RX ADMIN — Medication 1 MILLIGRAM(S): at 11:37

## 2019-06-27 NOTE — PROGRESS NOTE ADULT - ASSESSMENT
This is a 75 y/o female with PMHx advanced Parkinson's disease, orthostatic hypotension and anxiety, recent fall with +right wrist fx, brought in by her  to [facility] on 6/6/19 for ongoing body shaking at home and worsening unsteady gait, found to have e coli ESBL+ UTI and placed on meropenem, being admitted to the Parkinson's program here at [facility] for acute inpatient treatment of Parkinson's disease, gait and ADL dysfunction.

## 2019-06-27 NOTE — PROGRESS NOTE ADULT - PROBLEM SELECTOR PROBLEM 6
REM sleep behavior disorder

## 2019-06-27 NOTE — PROGRESS NOTE ADULT - PROBLEM SELECTOR PLAN 2
Wellbutrin held. Zoloft held. Start Lexapro 10mg on 6/25. Discussed with Dr Ludin Gr. Ativan continues. Seroquel to 25mg at bedtime-slept well. Mood improved this am.

## 2019-06-27 NOTE — PROGRESS NOTE ADULT - ASSESSMENT
75 y/o female with PMHx advanced Parkinson's disease, orthostatic hypotension, and anxiety, recent fall with +right wrist fracture, brought in by her  to [facility] on 6/6/2019 for ongoing body shaking at home and worsening unsteady gait, admitted to the Parkinson's program here at [facility] for acute inpatient treatment of Parkinson's disease, gait and ADL dysfunction.     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     > Right UE with wrist fracture  - c/w splint, pain control  - NWB per Orthopedic eval     > Orthostatic hypotension  - c/w midodrine     > Anxiety and REM disordered  - c/w LORazepam Tablet 0.5 milliGRAM(s) Oral two times a day, and 1 mg at bedtime, buPROPion  milliGRAM(s) Oral daily, sertraline 200 milliGRAM(s) Oral daily  - f/u psychiatry Dr Ludin Gr    > Nausea  - zofran prn    > Dementia   - c/w donepezil 10 milliGRAM(s) Oral at bedtime, memantine 5 milliGRAM(s) Oral two times a day, folic acid 1 milliGRAM(s) Oral daily  - frequent re-orientation    > GERD  - c/w pantoprazole  Tablet 40 milliGRAM(s) Oral before breakfast  bowel regimen for constipation     > DVT ppx:  - enoxaparin Injectable 40 milliGRAM(s) SubCutaneous at bedtime

## 2019-06-27 NOTE — PROGRESS NOTE ADULT - SUBJECTIVE AND OBJECTIVE BOX
Hosp course prior to rehab:  This is a 75 y/o female with PMHx advanced Parkinson's disease, orthostatic hypotension and anxiety brought in by her  to [facility] on 6/6/19 for ongoing body shaking at home and worsening unsteady gait. Patient fell 8 days ago and sustained a right wrist fracture that was splinted in the ED. On 6/5/19, per outpatient Neuro recs, Midodrine was increased to 10mg in AM along with continued 5mg in evening for severe orthostasis.  notes patient experiencing body shaking with position changes, is unaware of this and becomes extremely weak afterwards. No prior history of seizures. No head trauma, tongue biting or bowel incontinence. Patient is intermittently incontinent of urine and was treated for ESBL E. coli UTI in April. SInce then has not had recurrent UTI's.     In the ED on the 6th, notable labs included normal CBC, CMP and UA with concerns for pyuria. CT head with brain atrophy, CXR clear. Patient admitted.     During hospitalization at , neurology consulted. EEG done and did not show any epileptiform activity.  Recommended to continue current regimen of Parkinson's medications - Sinemet 25/100 2 tabs 5 times per day, carbidopa 25 mg five times per day, Sinemet CR 50/200 qhs and to continue Midodrine with consideration to increase to 10 mg BID if needed.  Urine culture sent and shows >100, 000 e coli +ESBL. ID consulted and started patient on meropenem 500mg [id number]. Patient to continue meropenem until end of day on 6/13.   Patient medically stable and accepted into the Parkinson's program here at [facility] under Dr Coello's supervision on 6/10/19. (10 Carlos 2019 15:21)      S: update: feels well, denies insomnia, headache, wrist pain, falls.     O: Vital Signs Last 24 Hrs  T(C): 36.6 (27 Jun 2019 07:30), Max: 36.9 (26 Jun 2019 19:13)  T(F): 97.8 (27 Jun 2019 07:30), Max: 98.4 (26 Jun 2019 19:13)  HR: 67 (27 Jun 2019 07:30) (67 - 80)  BP: 144/90 (27 Jun 2019 07:30) (134/79 - 144/90)  BP(mean): --  RR: 14 (27 Jun 2019 07:30) (14 - 14)  SpO2: 94% (27 Jun 2019 07:30) (94% - 95%)    GENERAL: NAD, well-groomed, well-developed  HEAD:  Atraumatic, Normocephalic  EYES: EOMI, PERRL, conjunctiva and sclera clear  ENMT: Moist mucous membranes  NECK: Supple, No JVD, Normal thyroid  NERVOUS SYSTEM:  Alert & Oriented X2,    CHEST/LUNG: Clear to ascultation bilaterally; No rales, rhonchi, wheezing, or rubs  HEART: Regular rate and rhythm; No murmurs, rubs, or gallops  ABDOMEN: Soft, Nontender, Nondistended; Bowel sounds present  EXTREMITIES:  Right hand splint in place, 2+ Peripheral Pulses, No clubbing, cyanosis, or edema  SKIN: No rash, ecchymosis to bilat lower extremities improving                          12.8   6.54  )-----------( 199      ( 26 Jun 2019 05:30 )             40.1     26 Jun 2019 05:30    140    |  105    |  19     ----------------------------<  92     3.9     |  27     |  0.93     Ca    9.2        26 Jun 2019 05:30    TPro  6.4    /  Alb  3.3    /  TBili  0.3    /  DBili  x      /  AST  13     /  ALT  13     /  AlkPhos  96     26 Jun 2019 05:30    LIVER FUNCTIONS - ( 26 Jun 2019 05:30 )  Alb: 3.3 g/dL / Pro: 6.4 g/dL / ALK PHOS: 96 U/L / ALT: 13 U/L DA / AST: 13 U/L / GGT: x             CAPILLARY BLOOD GLUCOSE            MEDICATIONS  (STANDING):  carbidopa 25 milliGRAM(s) Oral <User Schedule>  carbidopa/levodopa  25/100 2 Tablet(s) Oral <User Schedule>  carbidopa/levodopa CR 50/200 1 Tablet(s) Oral <User Schedule>  docusate sodium 100 milliGRAM(s) Oral two times a day  donepezil 10 milliGRAM(s) Oral at bedtime  enoxaparin Injectable 40 milliGRAM(s) SubCutaneous at bedtime  escitalopram 10 milliGRAM(s) Oral daily  folic acid 1 milliGRAM(s) Oral daily  LORazepam     Tablet 1 milliGRAM(s) Oral at bedtime  LORazepam     Tablet 0.5 milliGRAM(s) Oral two times a day  memantine 5 milliGRAM(s) Oral two times a day  midodrine 10 milliGRAM(s) Oral <User Schedule>  midodrine 5 milliGRAM(s) Oral <User Schedule>  nystatin Powder 1 Application(s) Topical two times a day  pantoprazole    Tablet 40 milliGRAM(s) Oral before breakfast  QUEtiapine 25 milliGRAM(s) Oral at bedtime  senna 2 Tablet(s) Oral at bedtime    MEDICATIONS  (PRN):  acetaminophen   Tablet .. 650 milliGRAM(s) Oral every 6 hours PRN Mild Pain (1 - 3)  ondansetron    Tablet 4 milliGRAM(s) Oral every 6 hours PRN Nausea and/or Vomiting  polyethylene glycol 3350 17 Gram(s) Oral daily PRN Constipation

## 2019-06-27 NOTE — PROGRESS NOTE ADULT - PROBLEM SELECTOR PROBLEM 4
Orthostatic hypotension
Orthostatic hypotension
Anxiety
Orthostatic hypotension

## 2019-06-27 NOTE — PROGRESS NOTE ADULT - PROBLEM SELECTOR PROBLEM 2
Wrist fracture
Anxiety

## 2019-06-27 NOTE — PROGRESS NOTE ADULT - PROBLEM SELECTOR PROBLEM 3
Orthostatic hypotension
Wrist fracture

## 2019-06-27 NOTE — PROGRESS NOTE ADULT - PROBLEM/PLAN-2
DISPLAY PLAN FREE TEXT
0 = swallows foods/liquids without difficulty

## 2019-06-27 NOTE — PROGRESS NOTE ADULT - PROBLEM SELECTOR PROBLEM 5
Prophylactic measure
Prophylactic measure
Colonized with infectious organism
Prophylactic measure

## 2019-06-27 NOTE — PROGRESS NOTE ADULT - PROVIDER SPECIALTY LIST ADULT
Hospitalist
Neurology
Rehab Medicine
Hospitalist

## 2019-06-27 NOTE — PROGRESS NOTE ADULT - REASON FOR ADMISSION
Parkinson's Disease c cognitive and functional deficits.
PD with instability and falls
Parkinson's disease
Parkinson's disease
frequent falls with Parkinson disease
parkinsons, falls, RAS morillo fx
Parkinson's disease
s/p fall right wrist fracture, treated for UTI and sent here for rehabilitation with Parkinson's disease and increased generalized weakness
PD c cognitive and functional deficits
PD c cognitive and functional deficits.
PD c functional and cognitive deficits.
PD c functional deficits
PD with functional and cognitive deficits
PD with functional and cognitive deficits.
Parkinson's Disease with cognitive and functional deficits.

## 2019-06-27 NOTE — PROGRESS NOTE ADULT - PROBLEM SELECTOR PLAN 6
Ativan before bedtime added to control disturbing dreams. Reports moderate improvements.
Ativan before bedtime added to control disturbing dreams.
Ativan before bedtime added to control disturbing dreams. Reports moderate improvements.
On Ativan dose before bedtime to control disturbing dreams.
Will add Ativan dose before bedtime to control disturbing dreams
Ativan before bedtime added to control disturbing dreams. Reports moderate improvements.

## 2019-06-27 NOTE — PROGRESS NOTE ADULT - ATTENDING COMMENTS
Sun Sheriff MD
Discharge plan discussed with team sharon MUNOZ
>lovenox for DVT P      I personally examined and performed the evaluation and management services (E/M) above
Multidisciplinary team meeting today:  patient's functional goals and needs, functional and clinical  progress were discussed, barriers to discharge were identified. Anticipate discharge home with home care, 24/7 supervision for safety  ELOS 2-3 weeks
Anxious, denies any physical discomfort  stable neurological exam  participates in therapy  case discussed with psychiatry, recently increased Ativan dose noted
Confrontational today, refusing to participate in therapy, states that " people want to hurt her".  Case discussed with Psychiatry , will follow recommendations and increase Seroquel dose.  Still with substantial functional needs, family training initiated before discharge. Will need 24/7 assistance at home. ? [place] option. Discharge plan and needs discussed with team and SW at length.  Neurological exam is stable  Orthopedic precautions are in place
Family training completed today,  was educated by therapists. Discharge plan has been finalized by team and SW. Will anticipate discharge home in the morning. Responded to Seroquel well, slept better.  Remains medically and neurologically stable
Observed in therapy, participates with encouragement  Family training today  Stable neurological exam  Discharge plan is in progress
Patient is being discharged home with home care today.  Discharge instructions were discussed with patient and family, all current medications were sent to the pharmacy. Patient and family were educated on importance of medication compliance,  continued  care with PMD and follow-up care with the specialists in the community. Safety and fall risk precautions  were discussed in detail, counseled on healthy life style modifications.  All questions were answered to their satisfaction.
Patient medically  ans neurologically stable. ID input noted, will observe off ABx.   Making progress towards rehab goals.   Continue rehab program.
No acute events over the weekend.  Anxious at times, asking for   Participates in therapy  Off ABx, no signs of infection  Stable BP control  Discharge plan discussed with ALEXANDER
No acute events over the weekend.  Very anxious, depressed. Psychiatry input appropriated, medication regimen is being adjusted.  Discharge plan discussed with team,  and SW.  Anticipate discharge home with 24/7 assistance this week.

## 2019-06-27 NOTE — PROGRESS NOTE ADULT - PROBLEM SELECTOR PROBLEM 1
Parkinson disease

## 2019-06-27 NOTE — PROGRESS NOTE ADULT - PROBLEM SELECTOR PLAN 1
comprehensive inpatient acute rehab program consisting of PT, OT and ST.   Continue current PD medication regimen.  Monitor orthostatic hypotension.   Midodrine with parameters.   Fall precautions. Family training completed. D/c home today with assistance.

## 2019-06-27 NOTE — PROGRESS NOTE ADULT - SUBJECTIVE AND OBJECTIVE BOX
CHIEF COMPLAINT: NAD, night uneventful. Denies pain.       HISTORY OF PRESENT ILLNESS  This is a 73 y/o female with PMHx advanced Parkinson's disease, orthostatic hypotension and anxiety brought in by her  to [facility] on 6/6/19 for ongoing body shaking at home and worsening unsteady gait. Patient fell 8 days ago and sustained a right wrist fracture that was splinted in the ED. On 6/5/19, per outpatient Neuro recs, Midodrine was increased to 10mg in AM along with continued 5mg in evening for severe orthostasis.  notes patient experiencing body shaking with position changes, is unaware of this and becomes extremely weak afterwards. No prior history of seizures. No head trauma, tongue biting or bowel incontinence. Patient is intermittently incontinent of urine and was treated for ESBL E. coli UTI in April. SInce then has not had recurrent UTI's.     In the ED on the 6th, notable labs included normal CBC, CMP and UA with concerns for pyuria. CT head with brain atrophy, CXR clear. Patient admitted.     During hospitalization at , neurology consulted. EEG done and did not show any epileptiform activity.  Recommended to continue current regimen of Parkinson's medications - Sinemet 25/100 2 tabs 5 times per day, carbidopa 25 mg five times per day, Sinemet CR 50/200 qhs and to continue Midodrine with consideration to increase to 10 mg BID if needed.  Urine culture sent and shows >100, 000 e coli +ESBL. ID consulted and started patient on meropenem 500mg [id number]. Patient to continue meropenem until end of day on 6/13.   Patient medically stable and accepted into the Parkinson's program here at [facility] under Dr Coello's supervision on 6/10/19. (10 Carlos 2019 15:21)    PAST MEDICAL & SURGICAL HISTORY:  Parkinson disease  Anxiety  Detached Retina, Left: and macular hole 5/2011  S/P Cataract Surgery: 2011  Macular Hole: 2010  Retinal Tear: 2003  x -        REVIEW OF SYMPTOMS  [X] Constitutional WNL     [X] Cardio WNL            [X] Resp WNL           [X] GI WNL                          [X]  WNL                   [X] Heme WNL              [X] Endo WNL                     [X] Skin WNL             VITALS  Vital Signs Last 24 Hrs  T(C): 36.6 (27 Jun 2019 07:30), Max: 36.9 (26 Jun 2019 19:13)  T(F): 97.8 (27 Jun 2019 07:30), Max: 98.4 (26 Jun 2019 19:13)  HR: 67 (27 Jun 2019 07:30) (67 - 80)  BP: 144/90 (27 Jun 2019 07:30) (134/79 - 144/90)  BP(mean): --  RR: 14 (27 Jun 2019 07:30) (14 - 14)  SpO2: 94% (27 Jun 2019 07:30) (94% - 95%)     PHYSICAL EXAM  Constitutional - NAD, in chair, smiling, comfortable  HEENT - NCAT, EOMI  Neck - Supple, No limited ROM  Chest - CTA bilaterally, No wheeze, No rhonchi, No crackles  Cardiovascular - RRR, S1S2, No murmurs  Abdomen - BS+, Soft, NTND  Extremities - right wrist limited ROM  Skin-moisture rash under breast  Wounds-na      Neurologic Exam - no new deficits.                    Balance - impaired     Psychiatric - Mood improved, reports good sleep, Emotional, Affect WNL         FUNCTIONAL PROGRESS  Gait -max A   ADLs - max A  Transfers - max A  Functional transfer - max A    RECENT LABS                        12.8   6.54  )-----------( 199      ( 26 Jun 2019 05:30 )             40.1     06-26    140  |  105  |  19  ----------------------------<  92  3.9   |  27  |  0.93    Ca    9.2      26 Jun 2019 05:30    TPro  6.4  /  Alb  3.3  /  TBili  0.3  /  DBili  x   /  AST  13  /  ALT  13  /  AlkPhos  96  06-26      LIVER FUNCTIONS - ( 26 Jun 2019 05:30 )  Alb: 3.3 g/dL / Pro: 6.4 g/dL / ALK PHOS: 96 U/L / ALT: 13 U/L DA / AST: 13 U/L / GGT: x                           RADIOLOGY/OTHER RESULTS      CURRENT MEDICATIONS  MEDICATIONS  (STANDING):  carbidopa 25 milliGRAM(s) Oral <User Schedule>  carbidopa/levodopa  25/100 2 Tablet(s) Oral <User Schedule>  carbidopa/levodopa CR 50/200 1 Tablet(s) Oral <User Schedule>  docusate sodium 100 milliGRAM(s) Oral two times a day  donepezil 10 milliGRAM(s) Oral at bedtime  enoxaparin Injectable 40 milliGRAM(s) SubCutaneous at bedtime  escitalopram 10 milliGRAM(s) Oral daily  folic acid 1 milliGRAM(s) Oral daily  LORazepam     Tablet 1 milliGRAM(s) Oral at bedtime  LORazepam     Tablet 0.5 milliGRAM(s) Oral two times a day  memantine 5 milliGRAM(s) Oral two times a day  midodrine 10 milliGRAM(s) Oral <User Schedule>  midodrine 5 milliGRAM(s) Oral <User Schedule>  nystatin Powder 1 Application(s) Topical two times a day  pantoprazole    Tablet 40 milliGRAM(s) Oral before breakfast  QUEtiapine 25 milliGRAM(s) Oral at bedtime  senna 2 Tablet(s) Oral at bedtime    MEDICATIONS  (PRN):  acetaminophen   Tablet .. 650 milliGRAM(s) Oral every 6 hours PRN Mild Pain (1 - 3)  ondansetron    Tablet 4 milliGRAM(s) Oral every 6 hours PRN Nausea and/or Vomiting  polyethylene glycol 3350 17 Gram(s) Oral daily PRN Constipation      ASSESSMENT & PLAN      GI/Bowel Management - Colace   Management - Toilet Q2  Skin - Turn Q2  Pain - Tylenol PRN  DVT PPX - Lovenox  Diet - reg c thins    Completed comprehensive acute rehab program consisting of 3hrs/day of OT/PT and SLP.

## 2019-06-27 NOTE — PROGRESS NOTE ADULT - PROBLEM SELECTOR PLAN 5
Lovenox for DVT ppx
Lovenox for DVT ppx.
Reviewed urinalysis (insignificant leukocytosis) and culture with patient and her ; it seems more likely to me that patient is COLONIZED and repeat cultures are not indicated at this time
Lovenox for DVT ppx
Lovenox for DVT ppx.

## 2019-06-27 NOTE — DISCHARGE NOTE NURSING/CASE MANAGEMENT/SOCIAL WORK - NSDCDPATPORTLINK_GEN_ALL_CORE
You can access the [facility] Patient Portal, offered by [facility], by registering with the following website: [email or web address]

## 2019-06-27 NOTE — PROGRESS NOTE ADULT - PROBLEM SELECTOR PLAN 4
Midodrine
Midodrine.
>continue wellbutrin XL, ativan  >pt provided with support and encouragement
Midodrine
Midodrine.

## 2019-07-02 ENCOUNTER — APPOINTMENT (OUTPATIENT)
Dept: NEUROLOGY | Facility: CLINIC | Age: 74
End: 2019-07-02
Payer: MEDICARE

## 2019-07-02 VITALS
BODY MASS INDEX: 29.44 KG/M2 | HEART RATE: 82 BPM | HEIGHT: 62 IN | DIASTOLIC BLOOD PRESSURE: 72 MMHG | WEIGHT: 160 LBS | SYSTOLIC BLOOD PRESSURE: 117 MMHG

## 2019-07-02 PROCEDURE — 99214 OFFICE O/P EST MOD 30 MIN: CPT

## 2019-07-02 NOTE — HISTORY OF PRESENT ILLNESS
[FreeTextEntry1] : Patient is a 74-year-old right handed woman who developed forgetfulness as well as obsessiveness (repetitive writing of numbers) about 2015, as well as shuffling in her gait. \par \par 1. Had two visits to [facility] for UIT needing IV Abx, and also one fall with right hand fracture\par 2. Most recently was at [facility], now home.  \par 3. No hallucinations. Currently taking Sinemet 25/100 2 tabs five times a day and Carbidopa 25mg 1 tab five times a day, and sinemet CR 50/200 at bedtime. No dyskinesias. In November, added midodrine (5mg bid) for near fainting episodes, which have resolved with that (now takes it as needed based on SBP after shower - if <110, taked it)\par 4. Her anxiety makes her worse and feels her anxiety has worsen. Currently being followed by a psychiatrist and a therapist. These were adjusted at [facility], dropped wellbutrin, added lexapro. Also on Zoloft 200mg daily, xanax 0.25mg twice a day. Taking Donepezil and memantine was added.\par 5. Sleep is ok, except nightmares, but thore are improved with adjustments. \par 6. Got certified for medical marijuana has not started yet

## 2019-07-02 NOTE — DISCUSSION/SUMMARY
[FreeTextEntry1] : In summary, Mrs. Dodge is a 74-year-old right-handed woman with PDism since about 2015/16 who comes for follow up. She states she is well. Continues with shuffling in her gait and some falls. However improvement since starting Sinemet and PT. Worsening in her anxiety as well. No hallucinations or motor fluctuations. At night has some trouble going to the bathroom. The examination is remarkable for hypomimia, resting and postural tremor, L>R, increase tone in neck, generalized rigidity and bradykinesia L>R, worse on heel stomps and toe taps. Gait with slow stride and short steps, retropulsion. \par I cannot rule out and atypical or mixed disorder at this time.\par \par Recommendations:\par 1. Continue sinemet 50/200 CR 1 tab at bedtime. Continue sinemet at 2 pills 5x/day. Continue Carbidopa 25mg 1 tab four times a day. Continue midodrine as needed. Can try to increase 1pm, 4,pm, 7pm doses to 2.5 pills, not first two doses (7am, 10am)\par 2. Continue Donepezil and Namenda again. Getting folate. \par 3. Follow up with psychiatrist, appointment next week (Dr Mcmanus)\par 4. Continue PT and exercise\par 5. Trial of medical marijuana for anxiety, PD\par

## 2019-07-04 DIAGNOSIS — Z22.9 CARRIER OF INFECTIOUS DISEASE, UNSPECIFIED: ICD-10-CM

## 2019-07-04 DIAGNOSIS — S62.91XD UNSPECIFIED FRACTURE OF RIGHT WRIST AND HAND, SUBSEQUENT ENCOUNTER FOR FRACTURE WITH ROUTINE HEALING: ICD-10-CM

## 2019-07-04 DIAGNOSIS — G20 PARKINSON'S DISEASE: ICD-10-CM

## 2019-07-04 DIAGNOSIS — R26.9 UNSPECIFIED ABNORMALITIES OF GAIT AND MOBILITY: ICD-10-CM

## 2019-07-04 DIAGNOSIS — B96.29 OTHER ESCHERICHIA COLI [E. COLI] AS THE CAUSE OF DISEASES CLASSIFIED ELSEWHERE: ICD-10-CM

## 2019-07-04 DIAGNOSIS — K21.9 GASTRO-ESOPHAGEAL REFLUX DISEASE WITHOUT ESOPHAGITIS: ICD-10-CM

## 2019-07-04 DIAGNOSIS — E44.1 MILD PROTEIN-CALORIE MALNUTRITION: ICD-10-CM

## 2019-07-04 DIAGNOSIS — G47.52 REM SLEEP BEHAVIOR DISORDER: ICD-10-CM

## 2019-07-04 DIAGNOSIS — N39.0 URINARY TRACT INFECTION, SITE NOT SPECIFIED: ICD-10-CM

## 2019-07-04 DIAGNOSIS — R11.0 NAUSEA: ICD-10-CM

## 2019-07-04 DIAGNOSIS — Z51.89 ENCOUNTER FOR OTHER SPECIFIED AFTERCARE: ICD-10-CM

## 2019-07-04 DIAGNOSIS — G90.3 MULTI-SYSTEM DEGENERATION OF THE AUTONOMIC NERVOUS SYSTEM: ICD-10-CM

## 2019-07-04 DIAGNOSIS — F32.9 MAJOR DEPRESSIVE DISORDER, SINGLE EPISODE, UNSPECIFIED: ICD-10-CM

## 2019-07-04 DIAGNOSIS — R32 UNSPECIFIED URINARY INCONTINENCE: ICD-10-CM

## 2019-07-04 DIAGNOSIS — K59.00 CONSTIPATION, UNSPECIFIED: ICD-10-CM

## 2019-07-04 DIAGNOSIS — F02.80 DEMENTIA IN OTHER DISEASES CLASSIFIED ELSEWHERE, UNSPECIFIED SEVERITY, WITHOUT BEHAVIORAL DISTURBANCE, PSYCHOTIC DISTURBANCE, MOOD DISTURBANCE, AND ANXIETY: ICD-10-CM

## 2019-07-04 DIAGNOSIS — Z91.81 HISTORY OF FALLING: ICD-10-CM

## 2019-07-04 DIAGNOSIS — F41.9 ANXIETY DISORDER, UNSPECIFIED: ICD-10-CM

## 2019-07-04 DIAGNOSIS — G31.83 NEUROCOGNITIVE DISORDER WITH LEWY BODIES: ICD-10-CM

## 2019-07-04 PROCEDURE — 97167 OT EVAL HIGH COMPLEX 60 MIN: CPT

## 2019-07-04 PROCEDURE — 84100 ASSAY OF PHOSPHORUS: CPT

## 2019-07-04 PROCEDURE — 97535 SELF CARE MNGMENT TRAINING: CPT

## 2019-07-04 PROCEDURE — 92507 TX SP LANG VOICE COMM INDIV: CPT

## 2019-07-04 PROCEDURE — 97163 PT EVAL HIGH COMPLEX 45 MIN: CPT

## 2019-07-04 PROCEDURE — 85027 COMPLETE CBC AUTOMATED: CPT

## 2019-07-04 PROCEDURE — 97110 THERAPEUTIC EXERCISES: CPT

## 2019-07-04 PROCEDURE — 92610 EVALUATE SWALLOWING FUNCTION: CPT

## 2019-07-04 PROCEDURE — 97530 THERAPEUTIC ACTIVITIES: CPT

## 2019-07-04 PROCEDURE — 73110 X-RAY EXAM OF WRIST: CPT

## 2019-07-04 PROCEDURE — 92523 SPEECH SOUND LANG COMPREHEN: CPT

## 2019-07-04 PROCEDURE — 97116 GAIT TRAINING THERAPY: CPT

## 2019-07-04 PROCEDURE — 97112 NEUROMUSCULAR REEDUCATION: CPT

## 2019-07-04 PROCEDURE — 80053 COMPREHEN METABOLIC PANEL: CPT

## 2019-07-04 PROCEDURE — 83735 ASSAY OF MAGNESIUM: CPT

## 2019-07-04 PROCEDURE — 36415 COLL VENOUS BLD VENIPUNCTURE: CPT

## 2019-07-04 PROCEDURE — G0515: CPT

## 2019-07-27 NOTE — ED PROVIDER NOTE - CONTEXT
Patient prepared for and ready to be discharged. Patient discharged at this time in no acute distress after verbalizing understanding of discharge instructions. Patient left after receiving After Visit Summary instructions.       Andrea Ruvalcaba RN  07/26/19 2040
UTI with esbl/known (describe)

## 2019-08-05 ENCOUNTER — RX RENEWAL (OUTPATIENT)
Age: 74
End: 2019-08-05

## 2019-09-03 ENCOUNTER — APPOINTMENT (OUTPATIENT)
Dept: NEUROLOGY | Facility: CLINIC | Age: 74
End: 2019-09-03

## 2019-09-16 ENCOUNTER — RX RENEWAL (OUTPATIENT)
Age: 74
End: 2019-09-16

## 2019-10-08 NOTE — CONSULT NOTE ADULT - CONSULT REASON
Management of anxiety, delusions, depression in the context of Parkinson's Disease
Management of depression/anxiety
uti
weight bearing staus for r wrist
medical co-management
confusion, anxiety
No

## 2019-10-10 ENCOUNTER — RX RENEWAL (OUTPATIENT)
Age: 74
End: 2019-10-10

## 2019-10-10 RX ORDER — MEMANTINE HYDROCHLORIDE 5 MG/1
5 TABLET, FILM COATED ORAL
Qty: 60 | Refills: 0 | Status: ACTIVE | COMMUNITY
Start: 2018-12-14 | End: 1900-01-01

## 2020-01-08 ENCOUNTER — APPOINTMENT (OUTPATIENT)
Dept: NEUROLOGY | Facility: CLINIC | Age: 75
End: 2020-01-08

## 2020-02-26 ENCOUNTER — RX RENEWAL (OUTPATIENT)
Age: 75
End: 2020-02-26

## 2020-04-23 ENCOUNTER — APPOINTMENT (OUTPATIENT)
Dept: NEUROLOGY | Facility: CLINIC | Age: 75
End: 2020-04-23
Payer: MEDICARE

## 2020-04-23 DIAGNOSIS — G47.9 SLEEP DISORDER, UNSPECIFIED: ICD-10-CM

## 2020-04-23 DIAGNOSIS — G47.50 PARASOMNIA, UNSPECIFIED: ICD-10-CM

## 2020-04-23 PROCEDURE — 99204 OFFICE O/P NEW MOD 45 MIN: CPT | Mod: 95

## 2020-04-23 PROCEDURE — 99213 OFFICE O/P EST LOW 20 MIN: CPT | Mod: 95

## 2020-04-23 RX ORDER — SERTRALINE HYDROCHLORIDE 50 MG/1
50 TABLET, FILM COATED ORAL
Qty: 90 | Refills: 5 | Status: DISCONTINUED | COMMUNITY
Start: 2017-04-14 | End: 2020-04-23

## 2020-04-23 RX ORDER — BUPROPION HYDROCHLORIDE 150 MG/1
150 TABLET, EXTENDED RELEASE ORAL
Qty: 90 | Refills: 0 | Status: DISCONTINUED | COMMUNITY
Start: 2017-10-10 | End: 2020-04-23

## 2020-04-23 RX ORDER — SERTRALINE HYDROCHLORIDE 100 MG/1
100 TABLET, FILM COATED ORAL
Qty: 180 | Refills: 0 | Status: DISCONTINUED | COMMUNITY
Start: 2017-09-12 | End: 2020-04-23

## 2020-04-23 RX ORDER — BUSPIRONE HYDROCHLORIDE 10 MG/1
10 TABLET ORAL
Qty: 60 | Refills: 0 | Status: DISCONTINUED | COMMUNITY
Start: 2017-03-15 | End: 2020-04-23

## 2020-04-23 NOTE — HISTORY OF PRESENT ILLNESS
[Home] : at home, [unfilled] , at the time of the visit. [Partner] : partner [Patient] : the patient [Self] : self [FreeTextEntry4] : Eliazar Dodge [FreeTextEntry1] : \par This is a telehealth visit that was performed with the originating site at the patient’s home and the distant site of my office at [address] [place] [address].\par Two way audio and visual technology was used. \par Verbal consent to participate in the telephone/video visit was obtained in lieu of in-person signature due to the coronavirus emergency.\par This particular visit occurred during the 2020 COVID-19 emergency. I discussed with the patient the nature of our telehealth visits, that:\par -I would evaluate the patient and recommend diagnostics and treatments based on my assessment.\par -Our sessions are not being recorded.\par -The patient acknowledged the risk of unsecure transmission of his/her information.\par -Our team would provide follow up care in person if/when the patient needs it.\par \par She is accompanied with her  during this visit.\par She has started to see Dr. Carrillo for her PD. Medications have been decreased with improvement in cognition and without any worsening of motor symptoms.\par \par She reports having nightmares for a long time.\par Her  thinks that the nightmare precede her diagnosis of PD and before she started medication.\par These occur nearly nightly, although lately they have not been as drastic.\par She sometimes gets agitated but she and her  are not aware of having any physical enactment of her dreams.\par Her  puts a chair on the side of her bed because sometimes her legs are shifting and look as if she may fall out of bed.\par \par She goes to bed at about 7 PM and reads or watches television until 9 PM. She may not get up until 8-9 Am the next day. She does not feel well rested upon awakening.\par She does not nap during the day.\par \par She denies snoring but her  says that she does snore. He says that the volume is moderate.\par Her  has not observed any pauses in her breathing at night.\par She does not wake up with a sore throat or dry mouth.\par She usually sleeps on her back.\par \par She denies having symptoms of Restless Legs Syndrome.\par \par She sometimes has some hypnagogic hallucinations.\par She does not have any sleep paralysis.\par \par Her [place] Sleepiness Scale Score is 1/24.\par \par

## 2020-04-23 NOTE — REVIEW OF SYSTEMS
[Feeling Tired] : feeling tired [Sleep Disturbances] : sleep disturbances [Anxiety] : anxiety [Eyesight Problems] : eyesight problems [Lower Ext Edema] : lower extremity edema [Incontinence] : incontinence [As Noted in HPI] : as noted in HPI [Negative] : Respiratory [FreeTextEntry7] : nausea

## 2020-04-23 NOTE — DISCUSSION/SUMMARY
[FreeTextEntry1] : Ms. Dodge is a 75 year old woman with a history of Parkinson's disease and sleep disturbance.\par \par She has snoring, unrefreshing sleep and nightmares.\par At this time she does not have any suggestion of dream enactment behavior to suggest REM Behavior Disorder.\par \par I am concerned about possible obstructive sleep apnea which may be causing disrupted sleep and leading to nonrestorative sleep and parasomnias.\par \par -Will order home sleep study to evaluate for possible obstructive sleep apnea.\par -Advised to move escitalopram from afternoon to the morning.\par -Continue safe bedroom environment. \par \par will follow up after sleep study.

## 2020-04-23 NOTE — PHYSICAL EXAM
[FreeTextEntry1] : Examination:\par Patient is well appearing\par Neurological Examination:\par Mental Status: Awake, alert. Oriented to person, place, not time. Able to name President\par Language: No aphasia\par Cranial Nerves:\par PERRL, EOMI, No facial weakness, tongue protrudes in the midline\par Motor Exam: No pronator drift. Mild bradykinesia Barrel roll  relatively intact. Some slowness with fine motor movement in hands. \par Sensory: intact on self exam\par Reflexes: Unable to examine\par Cerebellar: Finger to nose intact bilaterally. No tremors noted\par \par \par

## 2020-04-23 NOTE — CONSULT LETTER
[Dear  ___] : Dear  [unfilled], [Consult Letter:] : I had the pleasure of evaluating your patient, [unfilled]. [Consult Closing:] : Thank you very much for allowing me to participate in the care of this patient.  If you have any questions, please do not hesitate to contact me. [FreeTextEntry2] : Virgil Obrien [FreeTextEntry3] : Sincerely,\par \par \par Marina Kumar MD\par Diplomate, American Academy of Psychiatry and Neurology\par Board Certified in the Subspecialty of Clinical Neurophysiology\par Board Certified in the Subspecialty of Sleep Medicine\par Board Certified in the Subspecialty of Epilepsy\par

## 2020-06-08 ENCOUNTER — RX RENEWAL (OUTPATIENT)
Age: 75
End: 2020-06-08

## 2020-07-01 NOTE — ED ADULT NURSE NOTE - MODE OF DISCHARGE
CRRT:    Treatment ran for  2 Hours until system clotted off.  Both ports of right IJ trialysis are sluggish.  Suggested to instill Alteplase on both ports to dwell for 2 hours.   Ambulatory

## 2020-08-20 ENCOUNTER — APPOINTMENT (OUTPATIENT)
Dept: NEUROLOGY | Facility: CLINIC | Age: 75
End: 2020-08-20
Payer: MEDICARE

## 2020-08-20 PROCEDURE — 95806 SLEEP STUDY UNATT&RESP EFFT: CPT

## 2021-05-05 ENCOUNTER — NON-APPOINTMENT (OUTPATIENT)
Age: 76
End: 2021-05-05

## 2021-05-06 ENCOUNTER — NON-APPOINTMENT (OUTPATIENT)
Age: 76
End: 2021-05-06

## 2021-05-07 ENCOUNTER — APPOINTMENT (OUTPATIENT)
Dept: INTERNAL MEDICINE | Facility: CLINIC | Age: 76
End: 2021-05-07
Payer: MEDICARE

## 2021-05-07 VITALS
BODY MASS INDEX: 27.6 KG/M2 | HEART RATE: 81 BPM | SYSTOLIC BLOOD PRESSURE: 126 MMHG | HEIGHT: 62 IN | OXYGEN SATURATION: 96 % | RESPIRATION RATE: 18 BRPM | TEMPERATURE: 99.1 F | WEIGHT: 150 LBS | DIASTOLIC BLOOD PRESSURE: 84 MMHG

## 2021-05-07 DIAGNOSIS — J45.901 UNSPECIFIED ASTHMA WITH (ACUTE) EXACERBATION: ICD-10-CM

## 2021-05-07 DIAGNOSIS — Z20.822 CONTACT WITH AND (SUSPECTED) EXPOSURE TO COVID-19: ICD-10-CM

## 2021-05-07 DIAGNOSIS — R06.2 WHEEZING: ICD-10-CM

## 2021-05-07 DIAGNOSIS — R26.9 UNSPECIFIED ABNORMALITIES OF GAIT AND MOBILITY: ICD-10-CM

## 2021-05-07 DIAGNOSIS — J30.2 OTHER SEASONAL ALLERGIC RHINITIS: ICD-10-CM

## 2021-05-07 PROCEDURE — 99204 OFFICE O/P NEW MOD 45 MIN: CPT | Mod: 25

## 2021-05-07 NOTE — HISTORY OF PRESENT ILLNESS
[TextBox_4] : This is the first pulmonary appointment for this 76-year-old female who is accompanied by her  José Luis today.  She has a history of Parkinson's disease, decreased memory, anxiety/depression, obesity.  She has a history of seasonal allergies.  During the last year, she has noted occasional wheezing, coughing, and shortness of breath.  She was seen at [facility] earlier this year and had a CT scan of chest on March 11 which was unremarkable, mild secretions were seen in the trachea.  He had PFTs on 03/29/2021 showed an FEV1 of 0.89 or 45% predicted.  DLCO was 66% predicted, and FEV1/FVC was 83%.  She was told that she she has asthma.  She has not been taking her Symbicort.  She has been taking albuterol 2 puffs about 3 times per day recently.  She went on Medrol Dosepak earlier this year with improvement in her symptoms.  Is also on Flonase for nasal symptoms.\par \par She is not having fever or chills.  She denies recent chest pain, palpitations, or syncope.\par \par She is a non-smoker.  She does not drink alcohol.

## 2021-05-07 NOTE — ASSESSMENT
[FreeTextEntry1] : #1  Moderate persistent asthma. Mild exacerbation.  Patient will begin Symbicort 160 strength 1 to 2 puffs twice daily with gargling after.  Continue albuterol 2 puffs 4 times daily as needed for rescue.  She will take a Medrol Dosepak.  Call or return if symptoms are not improving.  Flonase for allergic rhinitis. \par \par #2 Likely component of restrictive lung disease.  Was counseled on a strict healthy weight reduction diet today.  See above.\par \par #3 PD.  Continue current medicines.\par \par #4 Decrease in memory.  Comtinue current medicines.\par \par RV in 6 months.  Patient will have full pulmonary function testing.

## 2021-05-07 NOTE — COUNSELING
[Potential consequences of obesity discussed] : Potential consequences of obesity discussed [Benefits of weight loss discussed] : Benefits of weight loss discussed [Decrease Portions] : decrease portions [FreeTextEntry2] : healthy foods

## 2021-05-07 NOTE — PHYSICAL EXAM
[No Acute Distress] : no acute distress [Normal Oropharynx] : normal oropharynx [Normal Appearance] : normal appearance [No Neck Mass] : no neck mass [Normal Rate/Rhythm] : normal rate/rhythm [Normal S1, S2] : normal s1, s2 [No Murmurs] : no murmurs [No Resp Distress] : no resp distress [Clear to Auscultation Bilaterally] : clear to auscultation bilaterally [No Abnormalities] : no abnormalities [Benign] : benign [No Clubbing] : no clubbing [No Cyanosis] : no cyanosis [No Edema] : no edema [Normal Color/ Pigmentation] : normal color/ pigmentation [No Focal Deficits] : no focal deficits [TextBox_2] : o [TextBox_68] : Soft end expiratory wheezes.

## 2021-11-24 NOTE — DISCHARGE NOTE PROVIDER - REASON FOR ADMISSION
sent in by PCP for ESBL UTI tested as outpatient and has generalised weakness
Attending Attestation (For Attendings USE Only)...

## 2021-11-29 ENCOUNTER — APPOINTMENT (OUTPATIENT)
Dept: INTERNAL MEDICINE | Facility: CLINIC | Age: 76
End: 2021-11-29
Payer: MEDICARE

## 2021-11-29 VITALS
OXYGEN SATURATION: 96 % | TEMPERATURE: 99 F | DIASTOLIC BLOOD PRESSURE: 98 MMHG | RESPIRATION RATE: 18 BRPM | HEIGHT: 62 IN | HEART RATE: 70 BPM | WEIGHT: 160 LBS | BODY MASS INDEX: 29.44 KG/M2 | SYSTOLIC BLOOD PRESSURE: 148 MMHG

## 2021-11-29 DIAGNOSIS — J30.9 ALLERGIC RHINITIS, UNSPECIFIED: ICD-10-CM

## 2021-11-29 LAB — SARS-COV-2 N GENE NPH QL NAA+PROBE: NOT DETECTED

## 2021-11-29 PROCEDURE — 94010 BREATHING CAPACITY TEST: CPT

## 2021-11-29 PROCEDURE — 94729 DIFFUSING CAPACITY: CPT

## 2021-11-29 PROCEDURE — 94727 GAS DIL/WSHOT DETER LNG VOL: CPT

## 2021-11-29 PROCEDURE — ZZZZZ: CPT

## 2021-11-29 PROCEDURE — 99214 OFFICE O/P EST MOD 30 MIN: CPT | Mod: 25

## 2021-11-29 RX ORDER — AMOXICILLIN AND CLAVULANATE POTASSIUM 250; 62.5 MG/5ML; MG/5ML
250-62.5 FOR SUSPENSION ORAL
Qty: 150 | Refills: 0 | Status: DISCONTINUED | COMMUNITY
Start: 2017-09-27 | End: 2021-11-29

## 2021-11-29 RX ORDER — ALBUTEROL SULFATE 90 UG/1
108 (90 BASE) AEROSOL, METERED RESPIRATORY (INHALATION)
Qty: 8 | Refills: 0 | Status: DISCONTINUED | COMMUNITY
Start: 2017-09-23 | End: 2021-11-29

## 2021-11-29 RX ORDER — CARBIDOPA 25 MG/1
25 TABLET ORAL
Qty: 450 | Refills: 3 | Status: DISCONTINUED | COMMUNITY
Start: 2017-11-21 | End: 2021-11-29

## 2021-11-29 RX ORDER — MIDODRINE HYDROCHLORIDE 5 MG/1
5 TABLET ORAL
Qty: 360 | Refills: 3 | Status: DISCONTINUED | COMMUNITY
Start: 2018-11-01 | End: 2021-11-29

## 2021-11-29 RX ORDER — CARBIDOPA AND LEVODOPA 50; 200 MG/1; MG/1
50-200 TABLET, EXTENDED RELEASE ORAL
Qty: 90 | Refills: 2 | Status: DISCONTINUED | COMMUNITY
Start: 2018-03-16 | End: 2021-11-29

## 2021-11-29 RX ORDER — METHYLPREDNISOLONE 4 MG/1
4 TABLET ORAL
Qty: 1 | Refills: 0 | Status: DISCONTINUED | COMMUNITY
Start: 2021-05-07 | End: 2021-11-29

## 2021-11-29 RX ORDER — CEFUROXIME AXETIL 500 MG/1
500 TABLET ORAL
Qty: 14 | Refills: 0 | Status: DISCONTINUED | COMMUNITY
Start: 2018-01-03 | End: 2021-11-29

## 2021-11-29 RX ORDER — ALPRAZOLAM 0.25 MG/1
0.25 TABLET ORAL
Qty: 60 | Refills: 0 | Status: DISCONTINUED | COMMUNITY
Start: 2017-06-12 | End: 2021-11-29

## 2021-11-29 RX ORDER — PAROXETINE HYDROCHLORIDE 40 MG/1
40 TABLET, FILM COATED ORAL
Qty: 90 | Refills: 0 | Status: DISCONTINUED | COMMUNITY
Start: 2017-01-18 | End: 2021-11-29

## 2021-11-29 NOTE — PHYSICAL EXAM
[No Acute Distress] : no acute distress [Normal Oropharynx] : normal oropharynx [Normal Appearance] : normal appearance [No Neck Mass] : no neck mass [Normal Rate/Rhythm] : normal rate/rhythm [Normal S1, S2] : normal s1, s2 [No Murmurs] : no murmurs [No Resp Distress] : no resp distress [Clear to Auscultation Bilaterally] : clear to auscultation bilaterally [No Abnormalities] : no abnormalities [Benign] : benign [Normal Gait] : normal gait [No Clubbing] : no clubbing [No Cyanosis] : no cyanosis [No Edema] : no edema [Normal Color/ Pigmentation] : normal color/ pigmentation [No Focal Deficits] : no focal deficits [TextBox_2] : obese

## 2021-11-29 NOTE — PROCEDURE
[FreeTextEntry1] : Full pulmonary function testing today shows a moderate restrictive and moderate obstructive ventilatory deficit with an FEV1 of 0.93 or 48% predicted.  Diffusing capacity is moderately reduced, but normal when corrected for alveolar volume.  Oxygen saturation 96% on room air.  The liseth noted a leak on volume testing and that the patient was unable to maintain a seal of the mouthpiece for this.

## 2021-11-29 NOTE — ASSESSMENT
[FreeTextEntry1] : #1  Moderate persistent asthma.  The patient will continue Symbicort 160 strength and montelukast.  Continue albuterol as needed for rescue.  She was given a prescription for Medrol Dosepak to have in case she has an exacerbation of symptoms, to take as needed.\par \par #2  Allergic rhinitis.  Nasal saline spray as needed.  Flonase.\par \par #3  Elevated blood pressure reading.  Patient was counseled on a healthy foods, KATHERINE, weight reduction diet, and regular exercise as tolerated.  She is seeing her PCP soon for following and will have a repeat blood pressure at that time.  Also to monitor her blood pressure readings at home. \par \par #4 obesity.  See above.\par \par Return pulmonary appointment in 6 months or anytime on an as-needed basis.

## 2021-11-29 NOTE — HISTORY OF PRESENT ILLNESS
[TextBox_4] : This is a return pulmonary appointment for this 76-year-old female is accompanied by her  today.  She has a history of persistent asthma and is maintained on Symbicort 160 strength, montelukast.  She is using her rescue albuterol inhaler about 2 times during the last 1 week.  She does note occasional dry cough and occasional wheeze.  She is able to walk across a couple rooms with assistance and breathe okay.\par \par She received influenza vaccination in September, 2021 and Pneumo 23 vaccination in 2020.  She received her Covid Moderna booster vaccination in August 2021.\par \par She does not smoke cigarettes.

## 2021-11-29 NOTE — COUNSELING
[Potential consequences of obesity discussed] : Potential consequences of obesity discussed [Benefits of weight loss discussed] : Benefits of weight loss discussed [Decrease Portions] : decrease portions [____ min/wk Activity] : [unfilled] min/wk activity [FreeTextEntry2] : healthy foods, KATHERINE

## 2022-01-14 NOTE — DISCHARGE NOTE NURSING/CASE MANAGEMENT/SOCIAL WORK - MODE OF TRANSPORTATION
Detail Level: Generalized General Sunscreen Counseling: I recommended a broad spectrum sunscreen with a SPF of 45 or higher.  I explained that SPF 45 sunscreens block approximately 97 percent of the sun's harmful rays.  Sunscreens should be applied at least 15 minutes prior to expected sun exposure and then every 2 hours after that as long as sun exposure continues. If swimming or exercising sunscreen should be reapplied every 45 minutes to an hour after getting wet or sweating.  One ounce, or the equivalent of a shot glass full of sunscreen, is adequate to protect the skin not covered by a bathing suit. I also recommended a lip balm with a sunscreen as well. Sun protective clothing can be used in lieu of sunscreen but must be worn the entire time you are exposed to the sun's rays. Wheelchair/Stroller

## 2022-03-07 ENCOUNTER — RX RENEWAL (OUTPATIENT)
Age: 77
End: 2022-03-07

## 2022-03-21 ENCOUNTER — RX RENEWAL (OUTPATIENT)
Age: 77
End: 2022-03-21

## 2022-05-23 ENCOUNTER — RESULT CHARGE (OUTPATIENT)
Age: 77
End: 2022-05-23

## 2022-05-24 ENCOUNTER — NON-APPOINTMENT (OUTPATIENT)
Age: 77
End: 2022-05-24

## 2022-05-24 ENCOUNTER — APPOINTMENT (OUTPATIENT)
Dept: INTERNAL MEDICINE | Facility: CLINIC | Age: 77
End: 2022-05-24
Payer: MEDICARE

## 2022-05-24 VITALS
DIASTOLIC BLOOD PRESSURE: 84 MMHG | SYSTOLIC BLOOD PRESSURE: 126 MMHG | BODY MASS INDEX: 27.6 KG/M2 | WEIGHT: 150 LBS | HEART RATE: 87 BPM | TEMPERATURE: 98.4 F | HEIGHT: 62 IN | OXYGEN SATURATION: 98 % | RESPIRATION RATE: 16 BRPM

## 2022-05-24 DIAGNOSIS — G20 PARKINSON'S DISEASE: ICD-10-CM

## 2022-05-24 PROCEDURE — 94010 BREATHING CAPACITY TEST: CPT

## 2022-05-24 PROCEDURE — 99214 OFFICE O/P EST MOD 30 MIN: CPT | Mod: 25

## 2022-05-24 NOTE — ASSESSMENT
[FreeTextEntry1] : #1  Moderate persistent and chronic asthma.  Patient will continue generic Symbicort 160 strength 2 puffs twice daily and oral montelukast.  Continue rescue albuterol inhaler as needed. \par \par #2  Obesity.  Patient was counseled on a healthy weight reduction diet and regular exercise as tolerated today.\par \par #3  Parkinsonism and memory deficit. \par \par Return pulmonary appointment in 6 months with full pulmonary function testing at that time.  Call or return as needed.

## 2022-05-24 NOTE — HISTORY OF PRESENT ILLNESS
[TextBox_4] : Following pulmonary appointment for this 77-year-old female was accompanied by her  today.  She has a history of parkinsonism and memory difficulties.  She never smoked cigarettes.  She has a history of moderate persistent and chronic asthma.  She is maintained on generic Symbicort 160 strength and montelukast.  She has needed to use her rescue albuterol inhaler about 1-2 times per week recently.  She has an occasional tickle cough at bedtime.  She is not noting wheezing or sputum production.  She is not having fever, chest pain, or palpitations.\par \par Received her his fourth Moderna COVID vaccination in April 2022.

## 2022-05-24 NOTE — PROCEDURE
[FreeTextEntry1] : Spirometry today shows a moderate obstructive and moderate restrictive deficit with an FEV1 of 1.05 or 55% predicted.  This was 0.93 in November 2021.

## 2022-05-24 NOTE — PHYSICAL EXAM
[No Acute Distress] : no acute distress [Normal Oropharynx] : normal oropharynx [Normal Appearance] : normal appearance [No Neck Mass] : no neck mass [Normal Rate/Rhythm] : normal rate/rhythm [Normal S1, S2] : normal s1, s2 [No Murmurs] : no murmurs [No Resp Distress] : no resp distress [Clear to Auscultation Bilaterally] : clear to auscultation bilaterally [No Abnormalities] : no abnormalities [Benign] : benign [Normal Gait] : normal gait [No Clubbing] : no clubbing [No Cyanosis] : no cyanosis [No Edema] : no edema [FROM] : FROM [Normal Color/ Pigmentation] : normal color/ pigmentation [Oriented x3] : oriented x3 [Normal Affect] : normal affect [TextBox_68] : Breath sounds are diminished.

## 2022-06-24 ENCOUNTER — RX RENEWAL (OUTPATIENT)
Age: 77
End: 2022-06-24

## 2022-08-04 NOTE — ED ADULT NURSE NOTE - PAIN RATING/NUMBER SCALE (0-10): REST
Dolores Acuna APRN  Authorized by: Dolores Acuna 0
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
## 2022-10-04 NOTE — ED ADULT NURSE NOTE - NS ED NURSE DISCH DISPOSITION
Heart Failure Clinic       Visit Date: 10/5/2022  Cardiologist:  Dr. Erin Klein  Primary Care Physician: Dr. Duran Yousif, APRN - CNP    Linde Councilman is a 81 y.o. male who presents today for:  No chief complaint on file. HPI:     TYPE HF: HFrEF 40-45%   Cause:   Device: Watchman 7/19/22  HX: Afib w/ RVR (watchman) CAD, HLD, HTN   Dry Wt:  228 on 10/5/22      Linde Councilman is a 81 y.o. male who presents to the office for a new patient visit in the heart failure clinic. Concerns today: new pt today, referred at recent hospital admission, CHF exacerbation likely due to Afib w/ RVR Started on lopressor (was on Toprol) and aldactone. Chest xray was negative for pulmonary vascular congestion. He was IV diuresed w/ improved SOB and LE swelling. Pt does not really want to follow here, they have a fixed income. They also feel that because they cut back on fluid oral intake that they will not have any other fluid overload issues. States that he urinated better when he was not on the water pill. Pt does note orthopnea, lower leg swelling and worsening SOB before the hospital admission, Macy Grimm continues but at baseline. Hospitalization:      Admit date:  9/26/2022                        Discharge date:   9/29/2022       Persistent atrial fibrillation, with RVR: Rate now CVR with appropriate medication changes. S/P Watchman device 7/19/2022. SANG on 9/12/22 shows EF 45%. [id number] Score 3. HR since stabilized. Continues on Plavix and ASA. Was recently switched from Coumadin to Plavix. Continued on digoxin home dose. Cardiology increased Lopressor to 100 mg BID, restarted Cardizem at reduced dose of120. RX given to reflect these changes. 2. Acute on Chronic decompensated HFrEF: Improved  Likely due to inadequately rate controlled Afib. SANG on 9/12/2022 shows EF 45%. CHF clinic referral completed. Continue daily weights, strict I+O, cardiac telemetry.  Continue with 2L fluid restriction and low sodium diet. Cardiology started Aldactone. Rx given. 3. Accelerated essential hypertension: Improved. BP's 188-360'C systolic. Continue Metoprolol tartrate, and Cardizem. Continue to monitor closely. 4. CAD, by history:    Noted on previous cath from 2011. Stress test since this time was negative for induced ischemia. ASA/Plavix/Statin/BB     Rate is now controlled. No SOB, minimal XIONG. Lower ext edema has resolved. Overall volume status has greatly improved. Cardiology recs to hold Bumex on discharge. Home today with plan above. Patient wishes only to see Dr. Robert Gutierrez in the cardiology clinic. Activity: ADLs performed w/ XIONG   Diet: following    Patient has:  Chest Pain: no  SOB: yes  Orthopnea/PND: yes, resolved  TRENT: no  Edema: yes, improved  Fatigue: yes  Abdominal bloating: no  Cough: no  Appetite: good      Past Medical History:   Diagnosis Date    Anesthesia     takes large dose of medications to make him sleepy for surgery    Atrial fibrillation ([address]     Benign prostatic hyperplasia with urinary frequency 10/21/2019    CAD (coronary artery disease)     Chest pain     Colon polyp 2011    removed    Coronary artery disease involving native coronary artery of native heart without angina pectoris 10/21/2019    Dementia ([address]     Dizziness     Elevated PSA     GERD (gastroesophageal reflux disease)     Heart disease     Hyperlipidemia     Hypertension      Past Surgical History:   Procedure Laterality Date    ADENOIDECTOMY      CARDIAC CATHETERIZATION  09/23/2011    Right radial artery cannulation. Moderate LV dysfunction. The patient has disease in the ostium of diagonal 1 and diagonal 2 branch, however they are both are at the  ostium of the diagonals. Intervention could compromise flow of LAD. THe LAD has long diffuse calcified lesion 50-60-% anatomically.     CARDIOVASCULAR STRESS TEST  09/23/2011    EF 37%    CARPAL TUNNEL RELEASE      COLONOSCOPY      NASAL SINUS SURGERY N/A 06/13/2022    Nasal Endoscopy Control of Nasal Hemorrhage performed by Danny Beck MD at [address]  08/2022    Watchman procedure    PROSTATECTOMY N/A 12/03/2020    ROBOTIC ASSISTED LAPAROSCOPIC RADICAL PROSTATECTOMY performed by Arben Branch MD at [address]      TRANSESOPHAGEAL ECHOCARDIOGRAM N/A 09/12/2022    TRANSESOPHAGEAL ECHOCARDIOGRAM performed by Sapphire Delatorre MD at [address] [facility] ECHOCARDIOGRAM  09/23/2011    EF 50-55%    ULTRASOUND PROSTATE/TRANSRECTAL N/A 10/06/2020    CYSTO, TRANSRECTAL ULTRASOUND GUIDED PROSTATE BX performed by Arben Branch MD at [facility]       Family History   Problem Relation Age of Onset    Heart Surgery Father     Cancer Father         lung    Heart Disease Father         CABG    Alzheimer's Disease Mother     Diabetes Neg Hx     High Blood Pressure Neg Hx     Stroke Neg Hx      Social History     Tobacco Use    Smoking status: Never    Smokeless tobacco: Never   Substance Use Topics    Alcohol use: No     Current Outpatient Medications   Medication Sig Dispense Refill    potassium chloride (KLOR-CON) 10 MEQ extended release tablet TAKE 1 TABLET BY MOUTH DAILY 6 tablet 0    furosemide (LASIX) 20 MG tablet Take 0.5 tablets by mouth daily for 10 days 5 tablet 0    metoprolol (LOPRESSOR) 100 MG tablet Take 1 tablet by mouth 2 times daily 60 tablet 0    dilTIAZem (CARDIZEM CD) 120 MG extended release capsule Take 1 capsule by mouth daily 30 capsule 0    spironolactone (ALDACTONE) 25 MG tablet Take 1 tablet by mouth daily 30 tablet 0    digoxin (LANOXIN) 125 MCG tablet Take 1 tablet by mouth once daily 90 tablet 0    clopidogrel (PLAVIX) 75 MG tablet Take 1 tablet by mouth daily 30 tablet 3    aspirin 81 MG chewable tablet Take 1 tablet by mouth in the morning.  30 tablet 3    rosuvastatin (CRESTOR) 20 MG tablet Take 1 tablet by mouth daily 90 tablet 1    nitroGLYCERIN (NITROSTAT) 0.4 MG SL tablet DISSOLVE ONE TABLET UNDER THE TONGUE EVERY 5 MINUTES AS NEEDED FOR CHEST PAIN. DO NOT EXCEED A TOTAL OF 3 DOSES IN 15 MINUTES 25 tablet 2    bisacodyl (DULCOLAX) 5 MG EC tablet Take 5 mg by mouth nightly       No current facility-administered medications for this visit. No Known Allergies    SUBJECTIVE:   Review of Systems   Constitutional:  Positive for fatigue. Negative for activity change, appetite change and diaphoresis. Respiratory:  Positive for shortness of breath. Negative for cough. Cardiovascular:  Negative for chest pain, palpitations and leg swelling. Gastrointestinal:  Negative for abdominal distention, nausea and vomiting. Neurological:  Negative for weakness, light-headedness and headaches. Hematological:  Negative for adenopathy. Psychiatric/Behavioral:  Negative for sleep disturbance. OBJECTIVE:   Today's Vitals:  /80   Pulse 76   Wt 228 lb 12.8 oz (103.8 kg)   SpO2 97%   BMI 30.19 kg/m²     Physical Exam  Vitals reviewed. Constitutional:       General: He is not in acute distress. Appearance: Normal appearance. He is well-developed. He is not diaphoretic. HENT:      Head: Normocephalic and atraumatic. Eyes:      Conjunctiva/sclera: Conjunctivae normal.   Cardiovascular:      Rate and Rhythm: Normal rate. Rhythm irregular. Heart sounds: Normal heart sounds. No murmur heard. Pulmonary:      Effort: Pulmonary effort is normal. No respiratory distress. Breath sounds: Normal breath sounds. No wheezing or rales. Abdominal:      General: Bowel sounds are normal. There is no distension. Palpations: Abdomen is soft. Tenderness: There is no abdominal tenderness. Musculoskeletal:         General: Normal range of motion. Cervical back: Normal range of motion and neck supple. Right lower leg: Edema (+1) present. Left lower leg: Edema (+1) present. Skin:     General: Skin is warm and dry.       Capillary Refill: Capillary refill takes less than 2 seconds. Neurological:      Mental Status: He is alert and oriented to person, place, and time. Coordination: Coordination normal.   Psychiatric:         Behavior: Behavior normal.       Wt Readings from Last 3 Encounters:   10/05/22 228 lb 12.8 oz (103.8 kg)   10/04/22 230 lb (104.3 kg)   10/02/22 229 lb (103.9 kg)     BP Readings from Last 3 Encounters:   10/05/22 130/80   10/04/22 120/86   10/03/22 (!) 133/103     Pulse Readings from Last 3 Encounters:   10/05/22 76   10/04/22 80   10/03/22 91     Body mass index is 30.19 kg/m². ECHO:    Summary   Ejection fraction is visually estimated at 45%. There was mild global hypokinesis of the left ventricle. Mildly dilated left atrium. LAAO device in good position   no thrombi   no leaks   Aortic valve appears tricuspid. Mild-to-moderate aortic regurgitation is noted. Mild to Moderate mitral regurgitation is present. Signature      ----------------------------------------------------------------   Electronically signed by Alverto Trevino MD (Interpreting   physician) on 09/12/2022 at 07:00 PM   ----------------------------------------------------------------       CATH/STRESS:   Conclusions      Summary   This Nuclear Medicine study was negative for ischemia . technically difficult scan   abnormal E F      Recommendation   Medical management.       Signatures      ----------------------------------------------------------------   Electronically signed by Alverto Trevino MD (Interpreting   Cardiologist) on 11/16/2020 at 14:42   ----------------------------------------------------------------      Results reviewed:  BNP: No results found for: BNP  CBC:   Lab Results   Component Value Date/Time    WBC 7.5 10/02/2022 09:36 PM    RBC 5.89 10/02/2022 09:36 PM    RBC 5.63 05/17/2016 07:30 AM    HGB 16.2 10/02/2022 09:36 PM    HCT 50.5 10/02/2022 09:36 PM     10/02/2022 09:36 PM     CMP:    Lab Results   Component Value Date/Time    NA 139 10/02/2022 09:36 PM    K 4.9 10/02/2022 09:36 PM    K 4.1 09/29/2022 05:54 AM     10/02/2022 09:36 PM    CO2 24 10/02/2022 09:36 PM    BUN 16 10/02/2022 09:36 PM    CREATININE 1.1 10/02/2022 09:36 PM    LABGLOM 66 10/02/2022 09:36 PM    GLUCOSE 93 10/02/2022 09:36 PM    GLUCOSE 93 05/17/2016 07:30 AM    CALCIUM 9.8 10/02/2022 09:36 PM     Hepatic Function Panel:    Lab Results   Component Value Date/Time    ALKPHOS 74 10/02/2022 09:36 PM    ALT 28 10/02/2022 09:36 PM    AST 27 10/02/2022 09:36 PM    PROT 7.1 10/02/2022 09:36 PM    BILITOT 0.8 10/02/2022 09:36 PM    BILIDIR <0.2 09/26/2022 11:55 AM    LABALBU 3.8 10/02/2022 09:36 PM    LABALBU 4.5 01/12/2012 08:05 AM     Magnesium:    Lab Results   Component Value Date/Time    MG 2.1 10/02/2022 09:36 PM     PT/INR:    Lab Results   Component Value Date/Time    PROTIME 44.6 06/02/2022 12:03 PM    INR 1.17 10/02/2022 09:36 PM     Lipids:    Lab Results   Component Value Date/Time    TRIG 119 04/19/2022 07:13 AM    HDL 28 04/19/2022 07:13 AM    HDL 37 10/20/2010 12:00 AM    LDLCALC 85 10/04/2021 08:32 AM    LDLDIRECT 88.85 04/19/2022 07:13 AM    LABVLDL 27 05/17/2016 07:30 AM       ASSESSMENT AND PLAN:   The patient's condition/symptoms are stable        Diagnosis Orders   1. Chronic combined systolic and diastolic congestive heart failure (HCC)  Basic Metabolic Panel      2. Chronic systolic congestive heart failure, NYHA class 2 (HCC)          Continue:  GDMT:   ACE/ARB/ARNI -    BB - Lopressor 100 BID   Diuretic - Lasix 10 daily x 10 days  AA - Aldactone 25 daily  SGLT2 -    Vasodilator -   Other - ASA, Plavix Digoxin KCL 10 daily x 10      HFmrEF 45% 9/2022 (50-55% 2017)  S/P Watchman 7/2022  Stable, +1 lower leg swelling. States he urinated more when he was not on his lasix. To bring weights to appointment with [facility].      GDMT: Not at this time    Lab reviewed - K 4.9 Cr 1.1 Mag 2.1 TSH 8.66  Pro BNP is back up from hospitalization  ECHO 2022: mild to mod IA, mild to mod MR, LAAO in good position, mildly dilated LA  CATH none  Negative stress 2020    Repeat blood work on 10/24/22    Only take 1 potassium pill daily with the Lasix. Stop taking the Potassium (Klor-Con) when you stop the Lasix pill      Continue diet/fluid adherence  Continue daily wts. F/U w/ Cardiology  Does not want to follow in the CHF clinic      Tolerating above noted HF meds, no ill side effects noted. Will continue to monitor kidney function and electrolytes. Will optimize as tolerated. Pt is compliant w/ medications. Total visit time of 50 minutes has been spent with patient on education of symptoms, management, medication, and plan of care; as well as review of chart: labs, ECHO, radiology reports, etc.   I personally spent more then 50% of the appt time face to face with the patient. Daily weights  Fluid restriction of 2 Liters per day  Limit sodium in diet to around 5078-9625 mg/day  Monitor BP  Activity as tolerated     Patient was instructed to call the [address] for any changes in symptoms as noted in AVS.      No follow-ups on file. or sooner if needed     Patient given educational materials - see patient instructions. We discussed the importance of weighing oneself and recording daily. We also discussed the importance of a low sodium diet, higher sodium foods to avoid and better low sodium food options. Patient verbalizes understanding of plan of care using teach back method, and is agreeable to the treatment plan.        Electronically signed by NARESH Goins CNP on 10/5/2022 at 8:33 AM Discharged

## 2022-11-22 ENCOUNTER — APPOINTMENT (OUTPATIENT)
Dept: INTERNAL MEDICINE | Facility: CLINIC | Age: 77
End: 2022-11-22

## 2022-11-22 VITALS
HEIGHT: 62 IN | DIASTOLIC BLOOD PRESSURE: 90 MMHG | TEMPERATURE: 98.5 F | OXYGEN SATURATION: 97 % | SYSTOLIC BLOOD PRESSURE: 146 MMHG | RESPIRATION RATE: 18 BRPM | WEIGHT: 200 LBS | BODY MASS INDEX: 36.8 KG/M2 | HEART RATE: 87 BPM

## 2022-11-22 DIAGNOSIS — F41.8 OTHER SPECIFIED ANXIETY DISORDERS: ICD-10-CM

## 2022-11-22 DIAGNOSIS — E66.9 OBESITY, UNSPECIFIED: ICD-10-CM

## 2022-11-22 PROCEDURE — 99214 OFFICE O/P EST MOD 30 MIN: CPT | Mod: 25

## 2022-11-22 PROCEDURE — 94010 BREATHING CAPACITY TEST: CPT

## 2022-11-22 PROCEDURE — ZZZZZ: CPT

## 2022-11-22 PROCEDURE — 94729 DIFFUSING CAPACITY: CPT

## 2022-11-22 PROCEDURE — 94727 GAS DIL/WSHOT DETER LNG VOL: CPT

## 2022-11-22 NOTE — DATA REVIEWED
[FreeTextEntry1] : Full pulmonary function testing today shows a moderate obstructive and restrictive deficit with an FEV1 of 0.94 or 49% predicted.  Diffusion capacity is moderately reduced, but normal when corrected for alveolar volume.  Oxygen saturation is 97% on room air.

## 2022-11-22 NOTE — ASSESSMENT
[FreeTextEntry1] : #1  Moderate persistent, chronic asthma.  Patient will continue Symbicort 160 strength and p.o. montelukast.  Continue albuterol inhaler as needed for rescue.  Pt will return for a following pulmonary appointment in 6 months or on an as-needed basis.  \par \par #2 Obesity.  Health weight reduction diet. \par \par #3 Parkinson's disease.  Memory difficulties.

## 2022-11-22 NOTE — HISTORY OF PRESENT ILLNESS
[FreeTextEntry1] : RV [de-identified] : This is a pleasant 77-year-old female with a history of obesity, Parkinson's disease, memory difficulties, moderate persistent and chronic asthma, who returns for following pulmonary appointment.  She is accompanied with her  José Luis today.  She notes some respiratory symptoms occasionally with increased activities.  She also feels that she has a component of stress and anxiety.  She does note occasional wheeze.  There is been no change in her noted dyspnea on exertion.  She is not having fever.  She is maintained on Symbicort 160 strength and montelukast.  She will occasionally use her rescue albuterol inhaler,  0-1 time per day.\par \par She never smoked cigarettes.\par \par She received the seasonal flu vaccination in September of this year.  She received her by bivalent COVID vaccination in September of this year.

## 2022-11-23 ENCOUNTER — RX RENEWAL (OUTPATIENT)
Age: 77
End: 2022-11-23

## 2023-04-09 NOTE — PHYSICAL EXAM
"Radha "Mikayla Desouza was seen and treated in our emergency department on 4/9/2023.  She may return to work on 04/11/2023.       If you have any questions or concerns, please don't hesitate to call.      Elijah Crawford NP" [General Appearance - Alert] : alert [General Appearance - In No Acute Distress] : in no acute distress [General Appearance - Well Developed] : well developed [Oriented To Time, Place, And Person] : oriented to person, place, and time [Impaired Insight] : insight and judgment were intact [Mood] : the mood was normal [Person] : oriented to person [Place] : oriented to place [Registration Intact] : recent registration memory intact [Naming Objects] : no difficulty naming common objects [Fluency] : fluency intact [Cranial Nerves Optic (II)] : visual acuity intact bilaterally,  visual fields full to confrontation, pupils equal round and reactive to light [Cranial Nerves Oculomotor (III)] : extraocular motion intact [Cranial Nerves Trigeminal (V)] : facial sensation intact symmetrically [Cranial Nerves Facial (VII)] : face symmetrical [Cranial Nerves Vestibulocochlear (VIII)] : hearing was intact bilaterally [Cranial Nerves Glossopharyngeal (IX)] : tongue and palate midline [Cranial Nerves Accessory (XI - Cranial And Spinal)] : head turning and shoulder shrug symmetric [Cranial Nerves Hypoglossal (XII)] : there was no tongue deviation with protrusion [Motor Handedness Right-Handed] : the patient is right hand dominant [Time] : disoriented to time [Short Term Intact] : short term memory impaired [Paresis Pronator Drift Right-Sided] : no pronator drift on the right [Paresis Pronator Drift Left-Sided] : no pronator drift on the left [FreeTextEntry4] : Year = 3/7/17. 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 [FreeTextEntry1] : In wheelchair. Facial expression and volume speech were reduced. Extraocular movements were intact except possible supranuclear palsy (upgaze), no square waves jerk noted.  Mild bilateral resting and postural tremor. Tone was markedly increased at the neck, and bilateral upper extremities, Left>Right. Bradykinesia in finger taps, Left>Rrigh, worse on heel stomps and toe taps, Left>Right.  Able to get up with help only, severe retropulsion.

## 2023-04-21 ENCOUNTER — RX RENEWAL (OUTPATIENT)
Age: 78
End: 2023-04-21

## 2023-05-16 ENCOUNTER — NON-APPOINTMENT (OUTPATIENT)
Age: 78
End: 2023-05-16

## 2023-05-16 ENCOUNTER — APPOINTMENT (OUTPATIENT)
Dept: INTERNAL MEDICINE | Facility: CLINIC | Age: 78
End: 2023-05-16
Payer: MEDICARE

## 2023-05-16 VITALS
HEIGHT: 62 IN | DIASTOLIC BLOOD PRESSURE: 84 MMHG | BODY MASS INDEX: 36.8 KG/M2 | TEMPERATURE: 97.9 F | SYSTOLIC BLOOD PRESSURE: 140 MMHG | WEIGHT: 200 LBS | OXYGEN SATURATION: 99 % | HEART RATE: 73 BPM

## 2023-05-16 PROCEDURE — G0447 BEHAVIOR COUNSEL OBESITY 15M: CPT | Mod: 59

## 2023-05-16 PROCEDURE — 94060 EVALUATION OF WHEEZING: CPT

## 2023-05-16 PROCEDURE — 99214 OFFICE O/P EST MOD 30 MIN: CPT | Mod: 25

## 2023-05-16 RX ORDER — MEMANTINE HYDROCHLORIDE 5 MG/1
5 TABLET, FILM COATED ORAL
Qty: 120 | Refills: 3 | Status: DISCONTINUED | COMMUNITY
Start: 2018-03-02 | End: 2023-05-16

## 2023-05-16 NOTE — ASSESSMENT
[FreeTextEntry1] : #1  Persistent asthma.  Chronic asthma.  The patient will continue generic Symbicort 160 strength 2 puffs twice daily and montelukast.  Continue albuterol as needed for rescue.  These medicines were refilled today.  She also likely has a restrictive component and was counseled on weight reduction diet today.  See above for for return pulmonary appointment in 6 months with full pulmonary function testing at that time.  Call or return on an as-needed basis.\par \par #2 BMI 36.58.  Patient counseled on a healthy weight reduction diet today.  See above.\par \par #3  Parkinson's disease.  Memory difficulties.

## 2023-05-16 NOTE — PROCEDURE
[FreeTextEntry1] : Spirometry today shows a severe obstructive and restrictive deficit with a postbronchodilator FEV1 of 0.70 or 37% predicted.  Patient may not have given a full effort with this testing today.

## 2023-05-16 NOTE — PHYSICAL EXAM
[No Acute Distress] : no acute distress [Normal Oropharynx] : normal oropharynx [Normal Appearance] : normal appearance [No Neck Mass] : no neck mass [Normal Rate/Rhythm] : normal rate/rhythm [Normal S1, S2] : normal s1, s2 [No Resp Distress] : no resp distress [Clear to Auscultation Bilaterally] : clear to auscultation bilaterally [No Abnormalities] : no abnormalities [Benign] : benign [Normal Gait] : normal gait [No Clubbing] : no clubbing [No Cyanosis] : no cyanosis [No Edema] : no edema [Normal Color/ Pigmentation] : normal color/ pigmentation [No Focal Deficits] : no focal deficits [Oriented x3] : oriented x3 [Normal Affect] : normal affect [TextBox_2] : obese [TextBox_68] : Mildly decreased audible breath sounds bilaterally.

## 2023-05-16 NOTE — COUNSELING
[Potential consequences of obesity discussed] : Potential consequences of obesity discussed [Encouraged to increase physical activity] : Encouraged to increase physical activity [Decrease Portions] : decrease portions [____ min/wk Activity] : [unfilled] min/wk activity [FreeTextEntry2] : healthy foods [FreeTextEntry4] : 16

## 2023-05-16 NOTE — HISTORY OF PRESENT ILLNESS
[TextBox_4] : This is a 78-year-old female with a history of moderate persistent, chronic asthma, obesity, Parkinson's disease, memory difficulties, who returns for a following pulmonary appointment.  She is accompanied by her  today.  There is been no noted change in her noted dyspnea on exertion.  She only occasionally notes a wheeze.  She is not having coughing or sputum production.  She uses her rescue albuterol inhaler about 1-2 times per week.  She is maintained on generic Symbicort 160 strength and montelukast.  She denies fever.  He does occasionally get anxious and senses some shortness of breath more with this.  She currently is able to walk with assistance from another person.\par \par She does not smoke cigarettes.  She does not drink alcohol.

## 2023-10-02 ENCOUNTER — RX RENEWAL (OUTPATIENT)
Age: 78
End: 2023-10-02

## 2023-10-02 RX ORDER — ALBUTEROL SULFATE 90 UG/1
108 (90 BASE) INHALANT RESPIRATORY (INHALATION)
Qty: 1 | Refills: 3 | Status: ACTIVE | COMMUNITY
Start: 2021-05-07 | End: 1900-01-01

## 2023-12-01 ENCOUNTER — APPOINTMENT (OUTPATIENT)
Dept: INTERNAL MEDICINE | Facility: CLINIC | Age: 78
End: 2023-12-01
Payer: MEDICARE

## 2023-12-01 VITALS
RESPIRATION RATE: 18 BRPM | BODY MASS INDEX: 36.8 KG/M2 | HEART RATE: 73 BPM | HEIGHT: 62 IN | SYSTOLIC BLOOD PRESSURE: 143 MMHG | TEMPERATURE: 98.3 F | WEIGHT: 200 LBS | OXYGEN SATURATION: 96 % | DIASTOLIC BLOOD PRESSURE: 81 MMHG

## 2023-12-01 DIAGNOSIS — J45.909 UNSPECIFIED ASTHMA, UNCOMPLICATED: ICD-10-CM

## 2023-12-01 DIAGNOSIS — J45.40 MODERATE PERSISTENT ASTHMA, UNCOMPLICATED: ICD-10-CM

## 2023-12-01 DIAGNOSIS — G20.A1 PARKINSON'S DISEASE WITHOUT DYSKINESIA, WITHOUT MENTION OF FLUCTUATIONS: ICD-10-CM

## 2023-12-01 DIAGNOSIS — R41.3 OTHER AMNESIA: ICD-10-CM

## 2023-12-01 DIAGNOSIS — Z23 ENCOUNTER FOR IMMUNIZATION: ICD-10-CM

## 2023-12-01 PROCEDURE — 94060 EVALUATION OF WHEEZING: CPT

## 2023-12-01 PROCEDURE — 99214 OFFICE O/P EST MOD 30 MIN: CPT | Mod: 25

## 2023-12-01 PROCEDURE — 99204 OFFICE O/P NEW MOD 45 MIN: CPT | Mod: 25

## 2023-12-01 PROCEDURE — 94727 GAS DIL/WSHOT DETER LNG VOL: CPT

## 2023-12-01 PROCEDURE — 94729 DIFFUSING CAPACITY: CPT

## 2023-12-01 PROCEDURE — ZZZZZ: CPT

## 2023-12-01 PROCEDURE — G0447 BEHAVIOR COUNSEL OBESITY 15M: CPT | Mod: 59

## 2023-12-01 RX ORDER — PANTOPRAZOLE SODIUM 40 MG/1
40 TABLET, DELAYED RELEASE ORAL DAILY
Refills: 0 | Status: ACTIVE | COMMUNITY

## 2023-12-01 RX ORDER — CHROMIUM 200 MCG
TABLET ORAL
Refills: 0 | Status: ACTIVE | COMMUNITY

## 2023-12-01 RX ORDER — ASPIRIN 325 MG/1
325 TABLET ORAL DAILY
Refills: 0 | Status: ACTIVE | COMMUNITY

## 2024-04-05 ENCOUNTER — RX RENEWAL (OUTPATIENT)
Age: 79
End: 2024-04-05

## 2024-05-10 ENCOUNTER — RX RENEWAL (OUTPATIENT)
Age: 79
End: 2024-05-10

## 2024-05-10 RX ORDER — MONTELUKAST 10 MG/1
10 TABLET, FILM COATED ORAL
Qty: 90 | Refills: 1 | Status: ACTIVE | COMMUNITY
Start: 2021-06-14 | End: 1900-01-01

## 2024-06-05 ENCOUNTER — APPOINTMENT (OUTPATIENT)
Dept: INTERNAL MEDICINE | Facility: CLINIC | Age: 79
End: 2024-06-05

## 2024-06-11 ENCOUNTER — APPOINTMENT (OUTPATIENT)
Dept: INTERNAL MEDICINE | Facility: CLINIC | Age: 79
End: 2024-06-11

## 2024-06-11 ENCOUNTER — APPOINTMENT (OUTPATIENT)
Dept: PULMONOLOGY | Facility: CLINIC | Age: 79
End: 2024-06-11

## 2024-06-11 VITALS
TEMPERATURE: 97.7 F | HEART RATE: 86 BPM | SYSTOLIC BLOOD PRESSURE: 155 MMHG | HEIGHT: 62 IN | WEIGHT: 170 LBS | BODY MASS INDEX: 31.28 KG/M2 | RESPIRATION RATE: 18 BRPM | OXYGEN SATURATION: 99 % | DIASTOLIC BLOOD PRESSURE: 84 MMHG

## 2024-06-11 PROCEDURE — 99214 OFFICE O/P EST MOD 30 MIN: CPT

## 2024-06-11 RX ORDER — BUDESONIDE AND FORMOTEROL FUMARATE DIHYDRATE 160; 4.5 UG/1; UG/1
160-4.5 AEROSOL RESPIRATORY (INHALATION)
Qty: 1 | Refills: 3 | Status: DISCONTINUED | COMMUNITY
Start: 2021-05-07 | End: 2024-06-11

## 2024-06-11 RX ORDER — FLUTICASONE PROPIONATE AND SALMETEROL 500; 50 UG/1; UG/1
500-50 POWDER RESPIRATORY (INHALATION) TWICE DAILY
Qty: 1 | Refills: 5 | Status: ACTIVE | COMMUNITY
Start: 2024-06-11 | End: 1900-01-01

## 2024-06-11 RX ORDER — FLUTICASONE PROPIONATE AND SALMETEROL 250; 50 UG/1; UG/1
250-50 POWDER RESPIRATORY (INHALATION)
Qty: 60 | Refills: 2 | Status: DISCONTINUED | COMMUNITY
Start: 2024-04-05 | End: 2024-06-11

## 2024-06-11 RX ORDER — ASPIRIN 325 MG/1
TABLET, FILM COATED ORAL
Refills: 0 | Status: ACTIVE | COMMUNITY

## 2024-06-11 RX ORDER — PANTOPRAZOLE 40 MG/1
40 TABLET, DELAYED RELEASE ORAL
Refills: 0 | Status: ACTIVE | COMMUNITY

## 2024-06-11 RX ORDER — FLUTICASONE PROPIONATE 50 UG/1
50 SPRAY, METERED NASAL
Qty: 1 | Refills: 3 | Status: DISCONTINUED | COMMUNITY
Start: 2021-08-08 | End: 2024-06-11

## 2024-06-11 RX ORDER — CAYENNE 450 MG
CAPSULE ORAL
Refills: 0 | Status: ACTIVE | COMMUNITY

## 2024-06-11 RX ORDER — GALANTAMINE 16 MG/1
16 CAPSULE, EXTENDED RELEASE ORAL
Refills: 0 | Status: ACTIVE | COMMUNITY

## 2024-06-11 RX ORDER — LORAZEPAM 0.5 MG/1
0.5 TABLET ORAL
Refills: 0 | Status: ACTIVE | COMMUNITY

## 2024-06-11 RX ORDER — NITROFURANTOIN MACROCRYSTAL 100 MG
100 CAPSULE ORAL
Refills: 0 | Status: ACTIVE | COMMUNITY

## 2024-06-11 RX ORDER — MIRTAZAPINE 15 MG/1
15 TABLET, FILM COATED ORAL
Refills: 0 | Status: ACTIVE | COMMUNITY

## 2024-06-11 RX ORDER — METHYLPREDNISOLONE 4 MG/1
4 TABLET ORAL
Qty: 1 | Refills: 0 | Status: ACTIVE | COMMUNITY
Start: 2021-11-29 | End: 1900-01-01

## 2024-06-11 RX ORDER — BUDESONIDE AND FORMOTEROL FUMARATE DIHYDRATE 160; 4.5 UG/1; UG/1
160-4.5 AEROSOL RESPIRATORY (INHALATION)
Qty: 1 | Refills: 5 | Status: DISCONTINUED | COMMUNITY
Start: 2022-03-21 | End: 2024-06-11

## 2024-06-11 RX ORDER — FLUTICASONE PROPIONATE AND SALMETEROL 250; 50 UG/1; UG/1
250-50 POWDER RESPIRATORY (INHALATION)
Qty: 1 | Refills: 2 | Status: DISCONTINUED | COMMUNITY
Start: 2024-01-18 | End: 2024-06-11

## 2024-06-11 RX ORDER — MELATONIN 3 MG
3 CAPSULE ORAL
Refills: 0 | Status: ACTIVE | COMMUNITY

## 2024-06-11 RX ORDER — ACETAMINOPHEN/DIPHENHYDRAMINE 500MG-25MG
1000 TABLET ORAL
Refills: 0 | Status: ACTIVE | COMMUNITY

## 2024-06-11 RX ORDER — FOLIC ACID 1 MG/1
1 TABLET ORAL
Refills: 0 | Status: ACTIVE | COMMUNITY

## 2024-08-16 NOTE — PATIENT PROFILE ADULT - NSPROPTRIGHTBILLOFRIGHTS_GEN_A_NUR
Detail Level: Zone Initiate Treatment: :\\n- minoxidil 5% foam / solution bid \\n- D/C rubbing at hair patient

## 2024-09-09 ENCOUNTER — RX RENEWAL (OUTPATIENT)
Age: 79
End: 2024-09-09

## 2024-11-05 NOTE — DISCHARGE NOTE PROVIDER - NSDCCPCAREPLAN_GEN_ALL_CORE_FT
PRINCIPAL DISCHARGE DIAGNOSIS  Diagnosis: UTI (urinary tract infection)  Assessment and Plan of Treatment: complet course of antibiotic and folow up with PCP after rehab PRE-OP DIAGNOSIS:  Spinal stenosis 05-Nov-2024 18:54:42  Cresencio Rogers  Spinal stenosis 05-Nov-2024 18:54:42  Cresencio Rogers  Lumbar radiculopathy 05-Nov-2024 18:55:06  Cresencio Rogers

## 2024-12-17 ENCOUNTER — NON-APPOINTMENT (OUTPATIENT)
Age: 79
End: 2024-12-17

## 2024-12-17 ENCOUNTER — APPOINTMENT (OUTPATIENT)
Dept: INTERNAL MEDICINE | Facility: CLINIC | Age: 79
End: 2024-12-17
Payer: MEDICARE

## 2024-12-17 ENCOUNTER — APPOINTMENT (OUTPATIENT)
Dept: PULMONOLOGY | Facility: CLINIC | Age: 79
End: 2024-12-17

## 2024-12-17 VITALS
OXYGEN SATURATION: 96 % | DIASTOLIC BLOOD PRESSURE: 100 MMHG | HEIGHT: 62 IN | SYSTOLIC BLOOD PRESSURE: 164 MMHG | TEMPERATURE: 97.8 F | HEART RATE: 75 BPM | WEIGHT: 170 LBS | RESPIRATION RATE: 16 BRPM | BODY MASS INDEX: 31.28 KG/M2

## 2024-12-17 PROCEDURE — ZZZZZ: CPT

## 2024-12-17 PROCEDURE — 99213 OFFICE O/P EST LOW 20 MIN: CPT | Mod: 25

## 2024-12-17 PROCEDURE — 94060 EVALUATION OF WHEEZING: CPT

## 2024-12-17 RX ORDER — METHYLPREDNISOLONE 4 MG/1
4 TABLET ORAL
Qty: 1 | Refills: 0 | Status: ACTIVE | COMMUNITY
Start: 2024-12-17 | End: 1900-01-01

## 2025-01-10 ENCOUNTER — RX RENEWAL (OUTPATIENT)
Age: 80
End: 2025-01-10

## 2025-01-14 ENCOUNTER — RX RENEWAL (OUTPATIENT)
Age: 80
End: 2025-01-14

## 2025-02-05 ENCOUNTER — RX RENEWAL (OUTPATIENT)
Age: 80
End: 2025-02-05

## 2025-02-10 RX ORDER — MONTELUKAST 10 MG/1
10 TABLET, FILM COATED ORAL
Qty: 90 | Refills: 1 | Status: ACTIVE | COMMUNITY
Start: 2025-02-05 | End: 1900-01-01

## 2025-03-12 ENCOUNTER — RX RENEWAL (OUTPATIENT)
Age: 80
End: 2025-03-12

## 2025-04-09 ENCOUNTER — RX RENEWAL (OUTPATIENT)
Age: 80
End: 2025-04-09

## 2025-05-05 ENCOUNTER — RX RENEWAL (OUTPATIENT)
Age: 80
End: 2025-05-05

## 2025-05-15 ENCOUNTER — RX RENEWAL (OUTPATIENT)
Age: 80
End: 2025-05-15

## 2025-06-03 ENCOUNTER — APPOINTMENT (OUTPATIENT)
Dept: PULMONOLOGY | Facility: CLINIC | Age: 80
End: 2025-06-03

## 2025-06-03 ENCOUNTER — APPOINTMENT (OUTPATIENT)
Dept: INTERNAL MEDICINE | Facility: CLINIC | Age: 80
End: 2025-06-03
Payer: MEDICARE

## 2025-06-03 VITALS
OXYGEN SATURATION: 97 % | HEART RATE: 80 BPM | SYSTOLIC BLOOD PRESSURE: 167 MMHG | BODY MASS INDEX: 31.28 KG/M2 | TEMPERATURE: 97.6 F | RESPIRATION RATE: 16 BRPM | HEIGHT: 62 IN | WEIGHT: 170 LBS | DIASTOLIC BLOOD PRESSURE: 92 MMHG

## 2025-06-03 PROCEDURE — ZZZZZ: CPT

## 2025-06-03 PROCEDURE — 94010 BREATHING CAPACITY TEST: CPT

## 2025-06-03 PROCEDURE — 99213 OFFICE O/P EST LOW 20 MIN: CPT | Mod: 25

## 2025-06-03 PROCEDURE — 94727 GAS DIL/WSHOT DETER LNG VOL: CPT

## 2025-06-03 PROCEDURE — 94729 DIFFUSING CAPACITY: CPT

## 2025-06-03 RX ORDER — AZITHROMYCIN 250 MG/1
250 TABLET, FILM COATED ORAL
Qty: 1 | Refills: 0 | Status: ACTIVE | COMMUNITY
Start: 2025-06-03 | End: 1900-01-01